# Patient Record
Sex: FEMALE | Race: BLACK OR AFRICAN AMERICAN | Employment: OTHER | URBAN - METROPOLITAN AREA
[De-identification: names, ages, dates, MRNs, and addresses within clinical notes are randomized per-mention and may not be internally consistent; named-entity substitution may affect disease eponyms.]

---

## 2017-01-01 ENCOUNTER — HOSPITAL ENCOUNTER (OUTPATIENT)
Dept: RADIATION THERAPY | Age: 62
End: 2017-01-01
Payer: COMMERCIAL

## 2017-01-01 ENCOUNTER — OFFICE VISIT (OUTPATIENT)
Dept: SURGERY | Age: 62
End: 2017-01-01

## 2017-01-01 ENCOUNTER — APPOINTMENT (OUTPATIENT)
Dept: RADIATION THERAPY | Age: 62
End: 2017-01-01
Payer: COMMERCIAL

## 2017-01-01 ENCOUNTER — HOME CARE VISIT (OUTPATIENT)
Dept: SCHEDULING | Facility: HOME HEALTH | Age: 62
End: 2017-01-01
Payer: COMMERCIAL

## 2017-01-01 ENCOUNTER — ANESTHESIA (OUTPATIENT)
Dept: SURGERY | Age: 62
End: 2017-01-01
Payer: COMMERCIAL

## 2017-01-01 ENCOUNTER — HOSPITAL ENCOUNTER (OUTPATIENT)
Dept: NUCLEAR MEDICINE | Age: 62
Discharge: HOME | End: 2017-07-06
Attending: INTERNAL MEDICINE
Payer: COMMERCIAL

## 2017-01-01 ENCOUNTER — HOSPITAL ENCOUNTER (OUTPATIENT)
Dept: GENERAL RADIOLOGY | Age: 62
Discharge: HOME | End: 2017-07-19
Payer: COMMERCIAL

## 2017-01-01 ENCOUNTER — ANESTHESIA EVENT (OUTPATIENT)
Dept: SURGERY | Age: 62
End: 2017-01-01
Payer: COMMERCIAL

## 2017-01-01 ENCOUNTER — HOME CARE VISIT (OUTPATIENT)
Dept: HOSPICE | Facility: HOSPICE | Age: 62
End: 2017-01-01
Payer: COMMERCIAL

## 2017-01-01 ENCOUNTER — APPOINTMENT (OUTPATIENT)
Dept: CT IMAGING | Age: 62
End: 2017-01-01
Attending: EMERGENCY MEDICINE
Payer: COMMERCIAL

## 2017-01-01 ENCOUNTER — OFFICE VISIT (OUTPATIENT)
Dept: FAMILY MEDICINE CLINIC | Facility: CLINIC | Age: 62
End: 2017-01-01

## 2017-01-01 ENCOUNTER — HOSPITAL ENCOUNTER (OUTPATIENT)
Dept: GENERAL RADIOLOGY | Age: 62
Discharge: HOME | End: 2017-04-28
Payer: COMMERCIAL

## 2017-01-01 ENCOUNTER — HOSPITAL ENCOUNTER (OUTPATIENT)
Dept: LAB | Age: 62
Discharge: HOME | End: 2017-07-19
Payer: COMMERCIAL

## 2017-01-01 ENCOUNTER — HOSPITAL ENCOUNTER (OUTPATIENT)
Dept: INTERVENTIONAL RADIOLOGY/VASCULAR | Age: 62
Discharge: HOME | End: 2017-10-04
Attending: INTERNAL MEDICINE
Payer: COMMERCIAL

## 2017-01-01 ENCOUNTER — APPOINTMENT (OUTPATIENT)
Dept: GENERAL RADIOLOGY | Age: 62
End: 2017-01-01
Attending: SURGERY
Payer: COMMERCIAL

## 2017-01-01 ENCOUNTER — HOSPICE ADMISSION (OUTPATIENT)
Dept: HOSPICE | Facility: HOSPICE | Age: 62
End: 2017-01-01
Payer: COMMERCIAL

## 2017-01-01 ENCOUNTER — HOSPITAL ENCOUNTER (OUTPATIENT)
Dept: CT IMAGING | Age: 62
Discharge: HOME | End: 2017-07-06
Attending: INTERNAL MEDICINE
Payer: COMMERCIAL

## 2017-01-01 ENCOUNTER — HOSPITAL ENCOUNTER (OUTPATIENT)
Dept: RADIATION THERAPY | Age: 62
Discharge: HOME | End: 2017-10-09
Payer: COMMERCIAL

## 2017-01-01 ENCOUNTER — HOSPITAL ENCOUNTER (OUTPATIENT)
Dept: RADIATION THERAPY | Age: 62
Discharge: HOME | End: 2017-10-11
Payer: COMMERCIAL

## 2017-01-01 ENCOUNTER — HOSPITAL ENCOUNTER (OUTPATIENT)
Dept: CT IMAGING | Age: 62
Discharge: HOME | End: 2017-01-05
Attending: INTERNAL MEDICINE
Payer: COMMERCIAL

## 2017-01-01 ENCOUNTER — HOSPITAL ENCOUNTER (OUTPATIENT)
Dept: RADIATION THERAPY | Age: 62
Discharge: HOME | End: 2017-10-13
Payer: COMMERCIAL

## 2017-01-01 ENCOUNTER — APPOINTMENT (OUTPATIENT)
Age: 62
End: 2017-01-01
Attending: EMERGENCY MEDICINE
Payer: COMMERCIAL

## 2017-01-01 ENCOUNTER — HOSPICE ADMISSION (OUTPATIENT)
Dept: HOSPICE | Facility: HOSPICE | Age: 62
End: 2017-01-01

## 2017-01-01 ENCOUNTER — HOSPITAL ENCOUNTER (OUTPATIENT)
Age: 62
Setting detail: OUTPATIENT SURGERY
Discharge: HOME | End: 2017-07-24
Attending: SURGERY | Admitting: SURGERY
Payer: COMMERCIAL

## 2017-01-01 ENCOUNTER — HOSPITAL ENCOUNTER (OUTPATIENT)
Age: 62
Discharge: HOME | End: 2017-03-14
Attending: INTERNAL MEDICINE
Payer: COMMERCIAL

## 2017-01-01 ENCOUNTER — HOSPITAL ENCOUNTER (OUTPATIENT)
Dept: RADIATION THERAPY | Age: 62
Discharge: HOME | End: 2017-10-06
Payer: COMMERCIAL

## 2017-01-01 ENCOUNTER — HOSPITAL ENCOUNTER (OUTPATIENT)
Dept: LAB | Age: 62
Discharge: HOME | End: 2017-07-12

## 2017-01-01 ENCOUNTER — HOSPITAL ENCOUNTER (OUTPATIENT)
Age: 62
Setting detail: OUTPATIENT SURGERY
Discharge: HOME | End: 2017-05-01
Attending: SURGERY | Admitting: SURGERY
Payer: COMMERCIAL

## 2017-01-01 ENCOUNTER — HOSPITAL ENCOUNTER (OUTPATIENT)
Dept: LAB | Age: 62
Discharge: HOME | End: 2017-04-27

## 2017-01-01 ENCOUNTER — HOSPITAL ENCOUNTER (OUTPATIENT)
Dept: RADIATION THERAPY | Age: 62
Discharge: HOME | End: 2017-09-27
Payer: COMMERCIAL

## 2017-01-01 ENCOUNTER — HOSPITAL ENCOUNTER (OUTPATIENT)
Dept: MRI IMAGING | Age: 62
Discharge: HOME | End: 2017-07-20
Attending: INTERNAL MEDICINE
Payer: COMMERCIAL

## 2017-01-01 ENCOUNTER — OFFICE VISIT (OUTPATIENT)
Dept: ORTHOPEDIC SURGERY | Facility: CLINIC | Age: 62
End: 2017-01-01

## 2017-01-01 ENCOUNTER — HOSPITAL ENCOUNTER (OUTPATIENT)
Age: 62
Setting detail: OBSERVATION
Discharge: HOSPICE | End: 2017-10-17
Attending: EMERGENCY MEDICINE | Admitting: EMERGENCY MEDICINE
Payer: COMMERCIAL

## 2017-01-01 ENCOUNTER — HOSPITAL ENCOUNTER (OUTPATIENT)
Dept: CT IMAGING | Age: 62
Discharge: HOME | End: 2017-09-11
Attending: INTERNAL MEDICINE
Payer: COMMERCIAL

## 2017-01-01 ENCOUNTER — HOSPITAL ENCOUNTER (OUTPATIENT)
Dept: MAMMOGRAPHY | Age: 62
Discharge: HOME | End: 2017-09-18
Attending: NURSE PRACTITIONER
Payer: COMMERCIAL

## 2017-01-01 ENCOUNTER — HOSPITAL ENCOUNTER (OUTPATIENT)
Dept: RADIATION THERAPY | Age: 62
Discharge: HOME | End: 2017-10-02
Payer: COMMERCIAL

## 2017-01-01 ENCOUNTER — OFFICE VISIT (OUTPATIENT)
Dept: CARDIOLOGY CLINIC | Age: 62
End: 2017-01-01

## 2017-01-01 ENCOUNTER — HOSPITAL ENCOUNTER (OUTPATIENT)
Dept: RADIATION THERAPY | Age: 62
Discharge: HOME | End: 2017-09-26
Payer: COMMERCIAL

## 2017-01-01 ENCOUNTER — HOSPITAL ENCOUNTER (OUTPATIENT)
Dept: RADIATION THERAPY | Age: 62
Discharge: HOME | End: 2017-01-18
Payer: COMMERCIAL

## 2017-01-01 ENCOUNTER — HOSPITAL ENCOUNTER (OUTPATIENT)
Dept: RADIATION THERAPY | Age: 62
Discharge: HOME | End: 2017-10-04
Payer: COMMERCIAL

## 2017-01-01 ENCOUNTER — HOSPITAL ENCOUNTER (OUTPATIENT)
Dept: MAMMOGRAPHY | Age: 62
Discharge: HOME | End: 2017-03-14
Attending: INTERNAL MEDICINE
Payer: COMMERCIAL

## 2017-01-01 ENCOUNTER — HOSPITAL ENCOUNTER (OUTPATIENT)
Dept: RADIATION THERAPY | Age: 62
Discharge: HOME | End: 2017-10-10
Payer: COMMERCIAL

## 2017-01-01 ENCOUNTER — HOSPITAL ENCOUNTER (OUTPATIENT)
Dept: ULTRASOUND IMAGING | Age: 62
Discharge: HOME | End: 2017-04-06
Attending: SURGERY
Payer: COMMERCIAL

## 2017-01-01 ENCOUNTER — HOSPITAL ENCOUNTER (OUTPATIENT)
Dept: RADIATION THERAPY | Age: 62
Discharge: HOME | End: 2017-10-12
Payer: COMMERCIAL

## 2017-01-01 ENCOUNTER — TELEPHONE (OUTPATIENT)
Dept: SURGERY | Age: 62
End: 2017-01-01

## 2017-01-01 ENCOUNTER — HOSPITAL ENCOUNTER (OUTPATIENT)
Dept: RADIATION THERAPY | Age: 62
Discharge: HOME | End: 2017-10-03
Payer: COMMERCIAL

## 2017-01-01 ENCOUNTER — HOSPITAL ENCOUNTER (OUTPATIENT)
Dept: GENERAL RADIOLOGY | Age: 62
Discharge: HOME | End: 2017-08-28
Payer: COMMERCIAL

## 2017-01-01 ENCOUNTER — HOSPITAL ENCOUNTER (OUTPATIENT)
Dept: LAB | Age: 62
Discharge: HOME | End: 2017-04-28
Payer: COMMERCIAL

## 2017-01-01 ENCOUNTER — TELEPHONE (OUTPATIENT)
Dept: FAMILY MEDICINE CLINIC | Facility: CLINIC | Age: 62
End: 2017-01-01

## 2017-01-01 VITALS
SYSTOLIC BLOOD PRESSURE: 118 MMHG | HEIGHT: 63 IN | RESPIRATION RATE: 18 BRPM | DIASTOLIC BLOOD PRESSURE: 78 MMHG | WEIGHT: 244 LBS | BODY MASS INDEX: 43.23 KG/M2 | TEMPERATURE: 97.7 F | HEART RATE: 98 BPM

## 2017-01-01 VITALS
DIASTOLIC BLOOD PRESSURE: 65 MMHG | SYSTOLIC BLOOD PRESSURE: 124 MMHG | WEIGHT: 262 LBS | TEMPERATURE: 98.3 F | BODY MASS INDEX: 46.42 KG/M2 | OXYGEN SATURATION: 97 % | HEART RATE: 99 BPM | RESPIRATION RATE: 18 BRPM | HEIGHT: 63 IN

## 2017-01-01 VITALS
WEIGHT: 265.5 LBS | OXYGEN SATURATION: 93 % | HEIGHT: 63 IN | HEART RATE: 83 BPM | SYSTOLIC BLOOD PRESSURE: 113 MMHG | RESPIRATION RATE: 20 BRPM | DIASTOLIC BLOOD PRESSURE: 71 MMHG | BODY MASS INDEX: 47.04 KG/M2 | TEMPERATURE: 97.1 F

## 2017-01-01 VITALS
HEART RATE: 90 BPM | BODY MASS INDEX: 47.13 KG/M2 | SYSTOLIC BLOOD PRESSURE: 114 MMHG | HEIGHT: 63 IN | WEIGHT: 266 LBS | DIASTOLIC BLOOD PRESSURE: 54 MMHG

## 2017-01-01 VITALS — HEIGHT: 63 IN | SYSTOLIC BLOOD PRESSURE: 113 MMHG | HEART RATE: 93 BPM | DIASTOLIC BLOOD PRESSURE: 67 MMHG

## 2017-01-01 VITALS
RESPIRATION RATE: 24 BRPM | TEMPERATURE: 98.2 F | SYSTOLIC BLOOD PRESSURE: 126 MMHG | HEIGHT: 63 IN | WEIGHT: 238 LBS | HEART RATE: 84 BPM | DIASTOLIC BLOOD PRESSURE: 70 MMHG | BODY MASS INDEX: 42.17 KG/M2

## 2017-01-01 VITALS
OXYGEN SATURATION: 95 % | TEMPERATURE: 98 F | RESPIRATION RATE: 20 BRPM | HEIGHT: 63 IN | DIASTOLIC BLOOD PRESSURE: 76 MMHG | WEIGHT: 246 LBS | SYSTOLIC BLOOD PRESSURE: 138 MMHG | HEART RATE: 92 BPM | BODY MASS INDEX: 43.59 KG/M2

## 2017-01-01 VITALS
DIASTOLIC BLOOD PRESSURE: 56 MMHG | SYSTOLIC BLOOD PRESSURE: 125 MMHG | HEART RATE: 70 BPM | TEMPERATURE: 98 F | BODY MASS INDEX: 46.06 KG/M2 | WEIGHT: 260 LBS

## 2017-01-01 VITALS
WEIGHT: 265 LBS | HEIGHT: 63 IN | HEART RATE: 74 BPM | SYSTOLIC BLOOD PRESSURE: 128 MMHG | BODY MASS INDEX: 46.95 KG/M2 | OXYGEN SATURATION: 91 % | RESPIRATION RATE: 18 BRPM | TEMPERATURE: 98 F | DIASTOLIC BLOOD PRESSURE: 64 MMHG

## 2017-01-01 VITALS
HEART RATE: 72 BPM | DIASTOLIC BLOOD PRESSURE: 78 MMHG | WEIGHT: 267 LBS | RESPIRATION RATE: 18 BRPM | TEMPERATURE: 98.1 F | SYSTOLIC BLOOD PRESSURE: 126 MMHG | BODY MASS INDEX: 47.31 KG/M2 | HEIGHT: 63 IN

## 2017-01-01 VITALS
RESPIRATION RATE: 16 BRPM | DIASTOLIC BLOOD PRESSURE: 63 MMHG | SYSTOLIC BLOOD PRESSURE: 111 MMHG | HEIGHT: 63 IN | OXYGEN SATURATION: 94 % | TEMPERATURE: 97.6 F | BODY MASS INDEX: 48.55 KG/M2 | WEIGHT: 274 LBS | HEART RATE: 74 BPM

## 2017-01-01 VITALS
HEIGHT: 63 IN | RESPIRATION RATE: 20 BRPM | BODY MASS INDEX: 47.13 KG/M2 | DIASTOLIC BLOOD PRESSURE: 70 MMHG | HEART RATE: 84 BPM | TEMPERATURE: 98.1 F | SYSTOLIC BLOOD PRESSURE: 110 MMHG | WEIGHT: 266 LBS

## 2017-01-01 VITALS
WEIGHT: 267 LBS | HEART RATE: 70 BPM | BODY MASS INDEX: 47.3 KG/M2 | DIASTOLIC BLOOD PRESSURE: 65 MMHG | TEMPERATURE: 96.1 F | SYSTOLIC BLOOD PRESSURE: 121 MMHG

## 2017-01-01 VITALS
RESPIRATION RATE: 19 BRPM | WEIGHT: 250.3 LBS | BODY MASS INDEX: 44.35 KG/M2 | HEART RATE: 94 BPM | SYSTOLIC BLOOD PRESSURE: 105 MMHG | HEIGHT: 63 IN | DIASTOLIC BLOOD PRESSURE: 71 MMHG | TEMPERATURE: 97.8 F | OXYGEN SATURATION: 96 %

## 2017-01-01 VITALS
RESPIRATION RATE: 16 BRPM | HEIGHT: 63 IN | BODY MASS INDEX: 43.23 KG/M2 | WEIGHT: 244 LBS | HEART RATE: 70 BPM | DIASTOLIC BLOOD PRESSURE: 65 MMHG | TEMPERATURE: 96.9 F | SYSTOLIC BLOOD PRESSURE: 105 MMHG

## 2017-01-01 VITALS
TEMPERATURE: 97.5 F | RESPIRATION RATE: 18 BRPM | HEART RATE: 83 BPM | HEIGHT: 63 IN | DIASTOLIC BLOOD PRESSURE: 80 MMHG | WEIGHT: 257.4 LBS | OXYGEN SATURATION: 96 % | SYSTOLIC BLOOD PRESSURE: 127 MMHG | BODY MASS INDEX: 45.61 KG/M2

## 2017-01-01 VITALS
DIASTOLIC BLOOD PRESSURE: 70 MMHG | HEART RATE: 85 BPM | SYSTOLIC BLOOD PRESSURE: 108 MMHG | RESPIRATION RATE: 20 BRPM | TEMPERATURE: 98.3 F | OXYGEN SATURATION: 96 %

## 2017-01-01 DIAGNOSIS — I11.9 BENIGN HYPERTENSIVE HEART DISEASE WITHOUT HEART FAILURE: Primary | ICD-10-CM

## 2017-01-01 DIAGNOSIS — C50.411 MALIGNANT NEOPLASM OF UPPER-OUTER QUADRANT OF RIGHT FEMALE BREAST (HCC): ICD-10-CM

## 2017-01-01 DIAGNOSIS — C78.7 ADENOCARCINOMA OF RIGHT BREAST METASTATIC TO LIVER (HCC): ICD-10-CM

## 2017-01-01 DIAGNOSIS — Z17.1: Primary | ICD-10-CM

## 2017-01-01 DIAGNOSIS — M17.11 PRIMARY OSTEOARTHRITIS OF RIGHT KNEE: Primary | ICD-10-CM

## 2017-01-01 DIAGNOSIS — C50.911 ADENOCARCINOMA OF RIGHT BREAST METASTATIC TO LIVER (HCC): ICD-10-CM

## 2017-01-01 DIAGNOSIS — Z09 POSTOPERATIVE EXAMINATION: ICD-10-CM

## 2017-01-01 DIAGNOSIS — F32.A DEPRESSION, UNSPECIFIED DEPRESSION TYPE: ICD-10-CM

## 2017-01-01 DIAGNOSIS — Z92.21 S/P CHEMOTHERAPY, TIME SINCE 4-12 WEEKS: Primary | ICD-10-CM

## 2017-01-01 DIAGNOSIS — G47.33 OSA ON CPAP: ICD-10-CM

## 2017-01-01 DIAGNOSIS — I11.9 BENIGN HYPERTENSIVE HEART DISEASE WITHOUT HEART FAILURE: ICD-10-CM

## 2017-01-01 DIAGNOSIS — M54.50 ACUTE BILATERAL LOW BACK PAIN WITHOUT SCIATICA: Primary | ICD-10-CM

## 2017-01-01 DIAGNOSIS — I20.8 ANGINA DECUBITUS (HCC): ICD-10-CM

## 2017-01-01 DIAGNOSIS — C50.911: Primary | ICD-10-CM

## 2017-01-01 DIAGNOSIS — E11.9 TYPE 2 DIABETES MELLITUS WITHOUT COMPLICATION, WITHOUT LONG-TERM CURRENT USE OF INSULIN (HCC): Primary | ICD-10-CM

## 2017-01-01 DIAGNOSIS — E66.01 MORBID OBESITY DUE TO EXCESS CALORIES (HCC): ICD-10-CM

## 2017-01-01 DIAGNOSIS — Z99.89 OSA ON CPAP: ICD-10-CM

## 2017-01-01 DIAGNOSIS — C79.49 MENINGEAL CARCINOMATOSIS (HCC): Primary | ICD-10-CM

## 2017-01-01 DIAGNOSIS — C50.912 MALIGNANT NEOPLASM OF LEFT FEMALE BREAST, UNSPECIFIED SITE OF BREAST: Primary | ICD-10-CM

## 2017-01-01 DIAGNOSIS — Z85.3 HISTORY OF RIGHT BREAST CANCER: Primary | ICD-10-CM

## 2017-01-01 DIAGNOSIS — E78.5 HYPERLIPIDEMIA, UNSPECIFIED HYPERLIPIDEMIA TYPE: ICD-10-CM

## 2017-01-01 DIAGNOSIS — Z09 POSTOPERATIVE EXAMINATION: Primary | ICD-10-CM

## 2017-01-01 DIAGNOSIS — C80.1 MENINGEAL CARCINOMATOSIS (HCC): Primary | ICD-10-CM

## 2017-01-01 DIAGNOSIS — Z12.31 SCREENING MAMMOGRAM, ENCOUNTER FOR: ICD-10-CM

## 2017-01-01 DIAGNOSIS — C50.912 MALIGNANT NEOPLASM OF LEFT FEMALE BREAST, UNSPECIFIED SITE OF BREAST: ICD-10-CM

## 2017-01-01 DIAGNOSIS — M17.11 PRIMARY OSTEOARTHRITIS OF RIGHT KNEE: ICD-10-CM

## 2017-01-01 DIAGNOSIS — M25.512 ACUTE PAIN OF LEFT SHOULDER: Primary | ICD-10-CM

## 2017-01-01 DIAGNOSIS — Z17.1: ICD-10-CM

## 2017-01-01 DIAGNOSIS — Z11.59 NEED FOR HEPATITIS C SCREENING TEST: ICD-10-CM

## 2017-01-01 DIAGNOSIS — I20.8 ANGINA DECUBITUS (HCC): Primary | ICD-10-CM

## 2017-01-01 DIAGNOSIS — I51.7 CARDIOMEGALY: ICD-10-CM

## 2017-01-01 DIAGNOSIS — C50.919 METASTATIC BREAST CANCER (HCC): ICD-10-CM

## 2017-01-01 DIAGNOSIS — N64.4 BREAST PAIN: ICD-10-CM

## 2017-01-01 DIAGNOSIS — Z85.3 PERSONAL HISTORY OF MALIGNANT NEOPLASM OF BREAST: Primary | ICD-10-CM

## 2017-01-01 DIAGNOSIS — K59.00 CONSTIPATION, UNSPECIFIED CONSTIPATION TYPE: ICD-10-CM

## 2017-01-01 DIAGNOSIS — C50.911: ICD-10-CM

## 2017-01-01 DIAGNOSIS — Z12.31 VISIT FOR SCREENING MAMMOGRAM: ICD-10-CM

## 2017-01-01 DIAGNOSIS — Z85.3 HISTORY OF RIGHT BREAST CANCER: ICD-10-CM

## 2017-01-01 DIAGNOSIS — Z92.21 S/P CHEMOTHERAPY, TIME SINCE 4-12 WEEKS: ICD-10-CM

## 2017-01-01 DIAGNOSIS — M70.51 PES ANSERINUS BURSITIS OF RIGHT KNEE: ICD-10-CM

## 2017-01-01 DIAGNOSIS — C50.919 BREAST CA (HCC): ICD-10-CM

## 2017-01-01 LAB
ALBUMIN SERPL BCP-MCNC: 3.1 G/DL (ref 3.4–5)
ALBUMIN SERPL-MCNC: 1.9 G/DL (ref 3.4–5)
ALBUMIN/GLOB SERPL: 0.5 {RATIO} (ref 0.8–1.7)
ALBUMIN/GLOB SERPL: 0.7 {RATIO} (ref 0.8–1.7)
ALP SERPL-CCNC: 148 U/L (ref 45–117)
ALP SERPL-CCNC: 435 U/L (ref 45–117)
ALT SERPL-CCNC: 115 U/L (ref 13–56)
ALT SERPL-CCNC: 20 U/L (ref 13–56)
ANION GAP BLD CALC-SCNC: 8 MMOL/L (ref 3–18)
ANION GAP BLD CALC-SCNC: 9 MMOL/L (ref 3–18)
ANION GAP SERPL CALC-SCNC: 6 MMOL/L (ref 3–18)
ANION GAP SERPL CALC-SCNC: 9 MMOL/L (ref 3–18)
AST SERPL W P-5'-P-CCNC: 10 U/L (ref 15–37)
AST SERPL-CCNC: 167 U/L (ref 15–37)
ATRIAL RATE: 72 BPM
ATRIAL RATE: 77 BPM
ATRIAL RATE: 78 BPM
BASOPHILS # BLD AUTO: 0 K/UL (ref 0–0.1)
BASOPHILS # BLD AUTO: 0 K/UL (ref 0–0.1)
BASOPHILS # BLD: 0 % (ref 0–2)
BASOPHILS # BLD: 0 % (ref 0–2)
BASOPHILS # BLD: 0 K/UL (ref 0–0.06)
BASOPHILS NFR BLD: 0 % (ref 0–2)
BILIRUB SERPL-MCNC: 0.3 MG/DL (ref 0.2–1)
BILIRUB SERPL-MCNC: 3.4 MG/DL (ref 0.2–1)
BILIRUB UR QL STRIP: NEGATIVE
BUN SERPL-MCNC: 11 MG/DL (ref 7–18)
BUN SERPL-MCNC: 15 MG/DL (ref 7–18)
BUN SERPL-MCNC: 16 MG/DL (ref 7–18)
BUN SERPL-MCNC: 34 MG/DL (ref 7–18)
BUN/CREAT SERPL: 14 (ref 12–20)
BUN/CREAT SERPL: 18 (ref 12–20)
BUN/CREAT SERPL: 18 (ref 12–20)
BUN/CREAT SERPL: 19 (ref 12–20)
CALCIUM SERPL-MCNC: 10.7 MG/DL (ref 8.5–10.1)
CALCIUM SERPL-MCNC: 7.2 MG/DL (ref 8.5–10.1)
CALCIUM SERPL-MCNC: 7.8 MG/DL (ref 8.5–10.1)
CALCIUM SERPL-MCNC: 9.4 MG/DL (ref 8.5–10.1)
CALCULATED P AXIS, ECG09: 34 DEGREES
CALCULATED P AXIS, ECG09: 41 DEGREES
CALCULATED P AXIS, ECG09: 45 DEGREES
CALCULATED R AXIS, ECG10: -45 DEGREES
CALCULATED R AXIS, ECG10: -46 DEGREES
CALCULATED R AXIS, ECG10: -63 DEGREES
CALCULATED T AXIS, ECG11: -11 DEGREES
CALCULATED T AXIS, ECG11: 15 DEGREES
CALCULATED T AXIS, ECG11: 33 DEGREES
CHLORIDE SERPL-SCNC: 101 MMOL/L (ref 100–108)
CHLORIDE SERPL-SCNC: 105 MMOL/L (ref 100–108)
CHLORIDE SERPL-SCNC: 98 MMOL/L (ref 100–108)
CHLORIDE SERPL-SCNC: 99 MMOL/L (ref 100–108)
CHOLEST SERPL-MCNC: 185 MG/DL
CO2 SERPL-SCNC: 26 MMOL/L (ref 21–32)
CO2 SERPL-SCNC: 29 MMOL/L (ref 21–32)
CO2 SERPL-SCNC: 30 MMOL/L (ref 21–32)
CO2 SERPL-SCNC: 32 MMOL/L (ref 21–32)
CREAT SERPL-MCNC: 0.62 MG/DL (ref 0.6–1.3)
CREAT SERPL-MCNC: 0.78 MG/DL (ref 0.6–1.3)
CREAT SERPL-MCNC: 0.89 MG/DL (ref 0.6–1.3)
CREAT SERPL-MCNC: 2.41 MG/DL (ref 0.6–1.3)
CREAT UR-MCNC: 0.7 MG/DL (ref 0.6–1.3)
CREAT UR-MCNC: 0.8 MG/DL (ref 0.6–1.3)
CREAT UR-MCNC: 0.9 MG/DL (ref 0.6–1.3)
DIAGNOSIS, 93000: NORMAL
DIFFERENTIAL METHOD BLD: ABNORMAL
EOSINOPHIL # BLD: 0 K/UL (ref 0–0.4)
EOSINOPHIL # BLD: 0.1 K/UL (ref 0–0.4)
EOSINOPHIL # BLD: 0.1 K/UL (ref 0–0.4)
EOSINOPHIL NFR BLD: 0 % (ref 0–5)
EOSINOPHIL NFR BLD: 2 % (ref 0–5)
EOSINOPHIL NFR BLD: 2 % (ref 0–5)
ERYTHROCYTE [DISTWIDTH] IN BLOOD BY AUTOMATED COUNT: 15.7 % (ref 11.6–14.5)
ERYTHROCYTE [DISTWIDTH] IN BLOOD BY AUTOMATED COUNT: 15.9 % (ref 11.6–14.5)
ERYTHROCYTE [DISTWIDTH] IN BLOOD BY AUTOMATED COUNT: 25.7 % (ref 11.6–14.5)
ERYTHROCYTE [DISTWIDTH] IN BLOOD BY AUTOMATED COUNT: 27 % (ref 11.6–14.5)
GLOBULIN SER CALC-MCNC: 3.5 G/DL (ref 2–4)
GLOBULIN SER CALC-MCNC: 4.4 G/DL (ref 2–4)
GLUCOSE BLD STRIP.AUTO-MCNC: 122 MG/DL (ref 70–110)
GLUCOSE BLD STRIP.AUTO-MCNC: 127 MG/DL (ref 70–110)
GLUCOSE BLD STRIP.AUTO-MCNC: 142 MG/DL (ref 70–110)
GLUCOSE BLD STRIP.AUTO-MCNC: 78 MG/DL (ref 70–110)
GLUCOSE BLD STRIP.AUTO-MCNC: 89 MG/DL (ref 70–110)
GLUCOSE BLD STRIP.AUTO-MCNC: 99 MG/DL (ref 70–110)
GLUCOSE SERPL-MCNC: 104 MG/DL (ref 74–99)
GLUCOSE SERPL-MCNC: 109 MG/DL (ref 74–99)
GLUCOSE SERPL-MCNC: 76 MG/DL (ref 74–99)
GLUCOSE SERPL-MCNC: 93 MG/DL (ref 74–99)
GLUCOSE UR-MCNC: NEGATIVE MG/DL
HCT VFR BLD AUTO: 36.7 % (ref 35–45)
HCT VFR BLD AUTO: 36.8 % (ref 35–45)
HCT VFR BLD AUTO: 39.2 % (ref 35–45)
HCT VFR BLD AUTO: 42.5 % (ref 35–45)
HDLC SERPL-MCNC: 17 MG/DL (ref 40–60)
HDLC SERPL: 10.9 {RATIO} (ref 0–5)
HGB BLD-MCNC: 11.8 G/DL (ref 12–16)
HGB BLD-MCNC: 12.3 G/DL (ref 12–16)
HGB BLD-MCNC: 12.6 G/DL (ref 12–16)
HGB BLD-MCNC: 13.8 G/DL (ref 12–16)
INR PPP: 1.5 (ref 0.8–1.2)
KETONES P FAST UR STRIP-MCNC: NEGATIVE MG/DL
LACTATE BLD-SCNC: 1.8 MMOL/L (ref 0.4–2)
LDL-C, EXTERNAL: 128
LDLC SERPL CALC-MCNC: 157.2 MG/DL (ref 0–100)
LIPID PROFILE,FLP: ABNORMAL
LYMPHOCYTES # BLD AUTO: 13 % (ref 21–52)
LYMPHOCYTES # BLD AUTO: 16 % (ref 21–52)
LYMPHOCYTES # BLD: 0.2 K/UL (ref 0.9–3.6)
LYMPHOCYTES # BLD: 0.8 K/UL (ref 0.9–3.6)
LYMPHOCYTES # BLD: 1.1 K/UL (ref 0.9–3.6)
LYMPHOCYTES NFR BLD: 5 % (ref 21–52)
MCH RBC QN AUTO: 27.1 PG (ref 24–34)
MCH RBC QN AUTO: 27.8 PG (ref 24–34)
MCH RBC QN AUTO: 28.6 PG (ref 24–34)
MCH RBC QN AUTO: 29.8 PG (ref 24–34)
MCHC RBC AUTO-ENTMCNC: 32.1 G/DL (ref 31–37)
MCHC RBC AUTO-ENTMCNC: 32.1 G/DL (ref 31–37)
MCHC RBC AUTO-ENTMCNC: 32.5 G/DL (ref 31–37)
MCHC RBC AUTO-ENTMCNC: 33.5 G/DL (ref 31–37)
MCV RBC AUTO: 84.6 FL (ref 74–97)
MCV RBC AUTO: 86.5 FL (ref 74–97)
MCV RBC AUTO: 88.2 FL (ref 74–97)
MCV RBC AUTO: 88.9 FL (ref 74–97)
MICROALBUMIN UR TEST STR-MCNC: 30 MG/L
MICROALBUMIN/CREAT RATIO POC: NORMAL MG/G
MONOCYTES # BLD: 0.4 K/UL (ref 0.05–1.2)
MONOCYTES # BLD: 0.4 K/UL (ref 0.05–1.2)
MONOCYTES # BLD: 0.7 K/UL (ref 0.05–1.2)
MONOCYTES NFR BLD AUTO: 11 % (ref 3–10)
MONOCYTES NFR BLD AUTO: 6 % (ref 3–10)
MONOCYTES NFR BLD: 9 % (ref 3–10)
NEUTS SEG # BLD: 4.1 K/UL (ref 1.8–8)
NEUTS SEG # BLD: 4.7 K/UL (ref 1.8–8)
NEUTS SEG # BLD: 5.6 K/UL (ref 1.8–8)
NEUTS SEG NFR BLD AUTO: 74 % (ref 40–73)
NEUTS SEG NFR BLD AUTO: 76 % (ref 40–73)
NEUTS SEG NFR BLD: 86 % (ref 40–73)
P-R INTERVAL, ECG05: 126 MS
P-R INTERVAL, ECG05: 162 MS
P-R INTERVAL, ECG05: 182 MS
PH UR STRIP: 5.5 [PH] (ref 4.6–8)
PLATELET # BLD AUTO: 128 K/UL (ref 135–420)
PLATELET # BLD AUTO: 272 K/UL (ref 135–420)
PLATELET # BLD AUTO: 282 K/UL (ref 135–420)
PLATELET # BLD AUTO: 86 K/UL (ref 135–420)
PMV BLD AUTO: 10.7 FL (ref 9.2–11.8)
PMV BLD AUTO: 11.2 FL (ref 9.2–11.8)
PMV BLD AUTO: 11.3 FL (ref 9.2–11.8)
POTASSIUM SERPL-SCNC: 3.9 MMOL/L (ref 3.5–5.5)
POTASSIUM SERPL-SCNC: 4.2 MMOL/L (ref 3.5–5.5)
POTASSIUM SERPL-SCNC: 4.2 MMOL/L (ref 3.5–5.5)
POTASSIUM SERPL-SCNC: 4.5 MMOL/L (ref 3.5–5.5)
PROT SERPL-MCNC: 5.4 G/DL (ref 6.4–8.2)
PROT SERPL-MCNC: 7.5 G/DL (ref 6.4–8.2)
PROT UR QL STRIP: NEGATIVE MG/DL
PROTHROMBIN TIME: 17.2 SEC (ref 11.5–15.2)
Q-T INTERVAL, ECG07: 364 MS
Q-T INTERVAL, ECG07: 408 MS
Q-T INTERVAL, ECG07: 428 MS
QRS DURATION, ECG06: 106 MS
QRS DURATION, ECG06: 108 MS
QRS DURATION, ECG06: 142 MS
QTC CALCULATION (BEZET), ECG08: 398 MS
QTC CALCULATION (BEZET), ECG08: 465 MS
QTC CALCULATION (BEZET), ECG08: 484 MS
RBC # BLD AUTO: 4.13 M/UL (ref 4.2–5.3)
RBC # BLD AUTO: 4.35 M/UL (ref 4.2–5.3)
RBC # BLD AUTO: 4.53 M/UL (ref 4.2–5.3)
RBC # BLD AUTO: 4.82 M/UL (ref 4.2–5.3)
RBC MORPH BLD: ABNORMAL
RBC MORPH BLD: ABNORMAL
SENTARA SPECIMEN COL,SENBCF: NORMAL
SODIUM SERPL-SCNC: 137 MMOL/L (ref 136–145)
SODIUM SERPL-SCNC: 137 MMOL/L (ref 136–145)
SODIUM SERPL-SCNC: 139 MMOL/L (ref 136–145)
SODIUM SERPL-SCNC: 139 MMOL/L (ref 136–145)
SP GR UR STRIP: 1.02 (ref 1–1.03)
TRIGL SERPL-MCNC: 54 MG/DL (ref ?–150)
UA UROBILINOGEN AMB POC: NORMAL (ref 0.2–1)
URATE SERPL-MCNC: 7.2 MG/DL (ref 2.6–7.2)
URINALYSIS CLARITY POC: NORMAL
URINALYSIS COLOR POC: NORMAL
URINE BLOOD POC: NEGATIVE
URINE LEUKOCYTES POC: NORMAL
URINE NITRITES POC: NEGATIVE
VENTRICULAR RATE, ECG03: 72 BPM
VENTRICULAR RATE, ECG03: 77 BPM
VENTRICULAR RATE, ECG03: 78 BPM
VLDLC SERPL CALC-MCNC: 10.8 MG/DL
WBC # BLD AUTO: 3.8 K/UL (ref 4.6–13.2)
WBC # BLD AUTO: 4.7 K/UL (ref 4.6–13.2)
WBC # BLD AUTO: 6.4 K/UL (ref 4.6–13.2)
WBC # BLD AUTO: 7.2 K/UL (ref 4.6–13.2)

## 2017-01-01 PROCEDURE — A4927 NON-STERILE GLOVES: HCPCS

## 2017-01-01 PROCEDURE — 77030011640 HC PAD GRND REM COVD -A: Performed by: SURGERY

## 2017-01-01 PROCEDURE — 99211 OFF/OP EST MAY X REQ PHY/QHP: CPT

## 2017-01-01 PROCEDURE — 76210000021 HC REC RM PH II 0.5 TO 1 HR: Performed by: SURGERY

## 2017-01-01 PROCEDURE — 74011000250 HC RX REV CODE- 250

## 2017-01-01 PROCEDURE — 74011000250 HC RX REV CODE- 250: Performed by: SURGERY

## 2017-01-01 PROCEDURE — 78306 BONE IMAGING WHOLE BODY: CPT

## 2017-01-01 PROCEDURE — 36415 COLL VENOUS BLD VENIPUNCTURE: CPT | Performed by: HOSPITALIST

## 2017-01-01 PROCEDURE — 93005 ELECTROCARDIOGRAM TRACING: CPT

## 2017-01-01 PROCEDURE — 77386 HC IMRT TRMT DLVR COMPL: CPT

## 2017-01-01 PROCEDURE — 77030032490 HC SLV COMPR SCD KNE COVD -B: Performed by: SURGERY

## 2017-01-01 PROCEDURE — 74011250637 HC RX REV CODE- 250/637: Performed by: NURSE ANESTHETIST, CERTIFIED REGISTERED

## 2017-01-01 PROCEDURE — 70553 MRI BRAIN STEM W/O & W/DYE: CPT

## 2017-01-01 PROCEDURE — 77370 RADIATION PHYSICS CONSULT: CPT

## 2017-01-01 PROCEDURE — 76642 ULTRASOUND BREAST LIMITED: CPT

## 2017-01-01 PROCEDURE — 74177 CT ABD & PELVIS W/CONTRAST: CPT

## 2017-01-01 PROCEDURE — 0651 HSPC ROUTINE HOME CARE

## 2017-01-01 PROCEDURE — 74011250636 HC RX REV CODE- 250/636: Performed by: EMERGENCY MEDICINE

## 2017-01-01 PROCEDURE — 99218 HC RM OBSERVATION: CPT

## 2017-01-01 PROCEDURE — 77030018836 HC SOL IRR NACL ICUM -A: Performed by: SURGERY

## 2017-01-01 PROCEDURE — 97161 PT EVAL LOW COMPLEX 20 MIN: CPT

## 2017-01-01 PROCEDURE — G0299 HHS/HOSPICE OF RN EA 15 MIN: HCPCS

## 2017-01-01 PROCEDURE — 74011250636 HC RX REV CODE- 250/636: Performed by: SURGERY

## 2017-01-01 PROCEDURE — 77059 MRI BREAST BI W WO CONT: CPT

## 2017-01-01 PROCEDURE — 76060000033 HC ANESTHESIA 1 TO 1.5 HR: Performed by: SURGERY

## 2017-01-01 PROCEDURE — A9585 GADOBUTROL INJECTION: HCPCS | Performed by: INTERNAL MEDICINE

## 2017-01-01 PROCEDURE — 82962 GLUCOSE BLOOD TEST: CPT

## 2017-01-01 PROCEDURE — 99285 EMERGENCY DEPT VISIT HI MDM: CPT

## 2017-01-01 PROCEDURE — 74011250637 HC RX REV CODE- 250/637: Performed by: EMERGENCY MEDICINE

## 2017-01-01 PROCEDURE — 71020 XR CHEST PA LAT: CPT

## 2017-01-01 PROCEDURE — 77030002933 HC SUT MCRYL J&J -A: Performed by: SURGERY

## 2017-01-01 PROCEDURE — 70450 CT HEAD/BRAIN W/O DYE: CPT

## 2017-01-01 PROCEDURE — 77030020782 HC GWN BAIR PAWS FLX 3M -B: Performed by: SURGERY

## 2017-01-01 PROCEDURE — 74011636320 HC RX REV CODE- 636/320: Performed by: INTERNAL MEDICINE

## 2017-01-01 PROCEDURE — 85025 COMPLETE CBC W/AUTO DIFF WBC: CPT | Performed by: EMERGENCY MEDICINE

## 2017-01-01 PROCEDURE — 84550 ASSAY OF BLOOD/URIC ACID: CPT | Performed by: EMERGENCY MEDICINE

## 2017-01-01 PROCEDURE — 74011250636 HC RX REV CODE- 250/636

## 2017-01-01 PROCEDURE — 82565 ASSAY OF CREATININE: CPT

## 2017-01-01 PROCEDURE — 85610 PROTHROMBIN TIME: CPT | Performed by: EMERGENCY MEDICINE

## 2017-01-01 PROCEDURE — 80053 COMPREHEN METABOLIC PANEL: CPT | Performed by: SURGERY

## 2017-01-01 PROCEDURE — 80048 BASIC METABOLIC PNL TOTAL CA: CPT | Performed by: NURSE PRACTITIONER

## 2017-01-01 PROCEDURE — 36415 COLL VENOUS BLD VENIPUNCTURE: CPT | Performed by: NURSE PRACTITIONER

## 2017-01-01 PROCEDURE — G0378 HOSPITAL OBSERVATION PER HR: HCPCS

## 2017-01-01 PROCEDURE — HOSPICE MEDICATION HC HH HOSPICE MEDICATION

## 2017-01-01 PROCEDURE — 74011250636 HC RX REV CODE- 250/636: Performed by: INTERNAL MEDICINE

## 2017-01-01 PROCEDURE — 76060000032 HC ANESTHESIA 0.5 TO 1 HR: Performed by: SURGERY

## 2017-01-01 PROCEDURE — A9585 GADOBUTROL INJECTION: HCPCS | Performed by: EMERGENCY MEDICINE

## 2017-01-01 PROCEDURE — 85025 COMPLETE CBC W/AUTO DIFF WBC: CPT | Performed by: NURSE PRACTITIONER

## 2017-01-01 PROCEDURE — 90686 IIV4 VACC NO PRSV 0.5 ML IM: CPT | Performed by: HOSPITALIST

## 2017-01-01 PROCEDURE — C1769 GUIDE WIRE: HCPCS | Performed by: SURGERY

## 2017-01-01 PROCEDURE — 80061 LIPID PANEL: CPT | Performed by: PHYSICIAN ASSISTANT

## 2017-01-01 PROCEDURE — 96360 HYDRATION IV INFUSION INIT: CPT

## 2017-01-01 PROCEDURE — 76010000149 HC OR TIME 1 TO 1.5 HR: Performed by: SURGERY

## 2017-01-01 PROCEDURE — 74011250636 HC RX REV CODE- 250/636: Performed by: NURSE ANESTHETIST, CERTIFIED REGISTERED

## 2017-01-01 PROCEDURE — 80048 BASIC METABOLIC PNL TOTAL CA: CPT | Performed by: EMERGENCY MEDICINE

## 2017-01-01 PROCEDURE — 85027 COMPLETE CBC AUTOMATED: CPT | Performed by: HOSPITALIST

## 2017-01-01 PROCEDURE — 36415 COLL VENOUS BLD VENIPUNCTURE: CPT | Performed by: SURGERY

## 2017-01-01 PROCEDURE — 77301 RADIOTHERAPY DOSE PLAN IMRT: CPT

## 2017-01-01 PROCEDURE — 71010 XR CHEST PORT: CPT

## 2017-01-01 PROCEDURE — 3336500001 HSPC ELECTION

## 2017-01-01 PROCEDURE — 74011250637 HC RX REV CODE- 250/637: Performed by: HOSPITALIST

## 2017-01-01 PROCEDURE — 76010000138 HC OR TIME 0.5 TO 1 HR: Performed by: SURGERY

## 2017-01-01 PROCEDURE — 74011250636 HC RX REV CODE- 250/636: Performed by: HOSPITALIST

## 2017-01-01 PROCEDURE — 77030013079 HC BLNKT BAIR HGGR 3M -A: Performed by: ANESTHESIOLOGY

## 2017-01-01 PROCEDURE — 96361 HYDRATE IV INFUSION ADD-ON: CPT

## 2017-01-01 PROCEDURE — 85025 COMPLETE CBC W/AUTO DIFF WBC: CPT | Performed by: SURGERY

## 2017-01-01 PROCEDURE — C1788 PORT, INDWELLING, IMP: HCPCS | Performed by: SURGERY

## 2017-01-01 PROCEDURE — 83605 ASSAY OF LACTIC ACID: CPT

## 2017-01-01 PROCEDURE — 77338 DESIGN MLC DEVICE FOR IMRT: CPT

## 2017-01-01 PROCEDURE — 77030028990 HC ADH TISS DERMFLX CHMP -B: Performed by: SURGERY

## 2017-01-01 PROCEDURE — 77336 RADIATION PHYSICS CONSULT: CPT

## 2017-01-01 PROCEDURE — 77030002996 HC SUT SLK J&J -A: Performed by: SURGERY

## 2017-01-01 PROCEDURE — 77030010507 HC ADH SKN DERMBND J&J -B: Performed by: SURGERY

## 2017-01-01 PROCEDURE — 70551 MRI BRAIN STEM W/O DYE: CPT

## 2017-01-01 PROCEDURE — 77300 RADIATION THERAPY DOSE PLAN: CPT

## 2017-01-01 PROCEDURE — 76210000006 HC OR PH I REC 0.5 TO 1 HR: Performed by: SURGERY

## 2017-01-01 PROCEDURE — 96372 THER/PROPH/DIAG INJ SC/IM: CPT

## 2017-01-01 PROCEDURE — 77001 FLUOROGUIDE FOR VEIN DEVICE: CPT

## 2017-01-01 PROCEDURE — 77030003028 HC SUT VCRL J&J -A: Performed by: SURGERY

## 2017-01-01 PROCEDURE — 80053 COMPREHEN METABOLIC PANEL: CPT | Performed by: HOSPITALIST

## 2017-01-01 PROCEDURE — 77062 BREAST TOMOSYNTHESIS BI: CPT

## 2017-01-01 PROCEDURE — 88300 SURGICAL PATH GROSS: CPT | Performed by: SURGERY

## 2017-01-01 PROCEDURE — 99001 SPECIMEN HANDLING PT-LAB: CPT | Performed by: FAMILY MEDICINE

## 2017-01-01 PROCEDURE — 77030002986 HC SUT PROL J&J -A: Performed by: SURGERY

## 2017-01-01 PROCEDURE — 36598 INJ W/FLUOR EVAL CV DEVICE: CPT

## 2017-01-01 PROCEDURE — 77030031139 HC SUT VCRL2 J&J -A: Performed by: SURGERY

## 2017-01-01 PROCEDURE — 90471 IMMUNIZATION ADMIN: CPT

## 2017-01-01 DEVICE — POWERPORT DUO M.R.I. IMPLANTABLE PORT WITH ATTACHABLE 9.5F CHRONOFLEX OPEN-ENDED DUAL-LUMEN VENOUS CATHETER. INTERMEDIATE KIT (WITH SUTURE PLUGS)
Type: IMPLANTABLE DEVICE | Site: CHEST | Status: FUNCTIONAL
Brand: POWERPORT M.R.I., CHRONOFLEX

## 2017-01-01 RX ORDER — METOPROLOL TARTRATE 50 MG/1
25 TABLET ORAL 2 TIMES DAILY
Qty: 60 TAB | Refills: 0 | Status: SHIPPED | OUTPATIENT
Start: 2017-01-01 | End: 2017-01-01

## 2017-01-01 RX ORDER — MAGNESIUM SULFATE 100 %
4 CRYSTALS MISCELLANEOUS AS NEEDED
Status: DISCONTINUED | OUTPATIENT
Start: 2017-01-01 | End: 2017-01-01 | Stop reason: SDUPTHER

## 2017-01-01 RX ORDER — DOCUSATE SODIUM 100 MG/1
100 CAPSULE, LIQUID FILLED ORAL 2 TIMES DAILY
Qty: 60 CAP | Refills: 2 | Status: SHIPPED | OUTPATIENT
Start: 2017-01-01 | End: 2017-01-01

## 2017-01-01 RX ORDER — BUPIVACAINE HYDROCHLORIDE AND EPINEPHRINE 2.5; 5 MG/ML; UG/ML
INJECTION, SOLUTION EPIDURAL; INFILTRATION; INTRACAUDAL; PERINEURAL AS NEEDED
Status: DISCONTINUED | OUTPATIENT
Start: 2017-01-01 | End: 2017-01-01 | Stop reason: HOSPADM

## 2017-01-01 RX ORDER — SODIUM CHLORIDE 0.9 % (FLUSH) 0.9 %
5-10 SYRINGE (ML) INJECTION AS NEEDED
Status: CANCELLED | OUTPATIENT
Start: 2017-01-01

## 2017-01-01 RX ORDER — BETAMETHASONE SODIUM PHOSPHATE AND BETAMETHASONE ACETATE 3; 3 MG/ML; MG/ML
6 INJECTION, SUSPENSION INTRA-ARTICULAR; INTRALESIONAL; INTRAMUSCULAR; SOFT TISSUE ONCE
Qty: 1 ML | Refills: 0
Start: 2017-01-01 | End: 2017-01-01

## 2017-01-01 RX ORDER — SODIUM CHLORIDE 0.9 % (FLUSH) 0.9 %
5-10 SYRINGE (ML) INJECTION AS NEEDED
Status: DISCONTINUED | OUTPATIENT
Start: 2017-01-01 | End: 2017-01-01 | Stop reason: HOSPADM

## 2017-01-01 RX ORDER — HYDROCODONE BITARTRATE AND ACETAMINOPHEN 5; 325 MG/1; MG/1
1 TABLET ORAL
Qty: 12 TAB | Refills: 0 | Status: SHIPPED | OUTPATIENT
Start: 2017-01-01 | End: 2017-01-01

## 2017-01-01 RX ORDER — FUROSEMIDE 40 MG/1
40 TABLET ORAL 2 TIMES DAILY
Qty: 60 TAB | Refills: 3 | Status: SHIPPED | OUTPATIENT
Start: 2017-01-01 | End: 2017-01-01 | Stop reason: SDUPTHER

## 2017-01-01 RX ORDER — FUROSEMIDE 40 MG/1
40 TABLET ORAL 2 TIMES DAILY
Status: DISCONTINUED | OUTPATIENT
Start: 2017-01-01 | End: 2017-01-01 | Stop reason: HOSPADM

## 2017-01-01 RX ORDER — DEXAMETHASONE 4 MG/1
2 TABLET ORAL EVERY 12 HOURS
Status: DISCONTINUED | OUTPATIENT
Start: 2017-01-01 | End: 2017-01-01 | Stop reason: HOSPADM

## 2017-01-01 RX ORDER — HYDROCODONE BITARTRATE AND ACETAMINOPHEN 5; 325 MG/1; MG/1
TABLET ORAL
Qty: 30 TAB | Refills: 0 | Status: ON HOLD | OUTPATIENT
Start: 2017-01-01 | End: 2017-01-01

## 2017-01-01 RX ORDER — SODIUM CHLORIDE 0.9 % (FLUSH) 0.9 %
5-10 SYRINGE (ML) INJECTION EVERY 8 HOURS
Status: CANCELLED | OUTPATIENT
Start: 2017-01-01

## 2017-01-01 RX ORDER — CEFAZOLIN SODIUM 2 G/50ML
2 SOLUTION INTRAVENOUS ONCE
Status: COMPLETED | OUTPATIENT
Start: 2017-01-01 | End: 2017-01-01

## 2017-01-01 RX ORDER — HYDROCODONE BITARTRATE AND ACETAMINOPHEN 5; 325 MG/1; MG/1
1 TABLET ORAL
Qty: 12 TAB | Refills: 0 | Status: SHIPPED | OUTPATIENT
Start: 2017-01-01

## 2017-01-01 RX ORDER — CITALOPRAM 20 MG/1
TABLET, FILM COATED ORAL
Qty: 30 TAB | Refills: 2 | Status: SHIPPED | OUTPATIENT
Start: 2017-01-01 | End: 2017-01-01 | Stop reason: SDUPTHER

## 2017-01-01 RX ORDER — IBUPROFEN 600 MG/1
600 TABLET ORAL
Qty: 90 TAB | Refills: 2 | Status: SHIPPED | OUTPATIENT
Start: 2017-01-01 | End: 2017-01-01

## 2017-01-01 RX ORDER — DEXAMETHASONE 2 MG/1
2 TABLET ORAL 2 TIMES DAILY WITH MEALS
Qty: 27 TAB | Refills: 0 | Status: SHIPPED | OUTPATIENT
Start: 2017-01-01 | End: 2017-10-31

## 2017-01-01 RX ORDER — INSULIN LISPRO 100 [IU]/ML
INJECTION, SOLUTION INTRAVENOUS; SUBCUTANEOUS ONCE
Status: DISCONTINUED | OUTPATIENT
Start: 2017-01-01 | End: 2017-01-01 | Stop reason: HOSPADM

## 2017-01-01 RX ORDER — DEXTROSE 50 % IN WATER (D50W) INTRAVENOUS SYRINGE
25-50 AS NEEDED
Status: DISCONTINUED | OUTPATIENT
Start: 2017-01-01 | End: 2017-01-01 | Stop reason: HOSPADM

## 2017-01-01 RX ORDER — ISOSORBIDE MONONITRATE 30 MG/1
30 TABLET, EXTENDED RELEASE ORAL DAILY
Status: DISCONTINUED | OUTPATIENT
Start: 2017-01-01 | End: 2017-01-01 | Stop reason: HOSPADM

## 2017-01-01 RX ORDER — DEXTROSE 50 % IN WATER (D50W) INTRAVENOUS SYRINGE
25-50 AS NEEDED
Status: DISCONTINUED | OUTPATIENT
Start: 2017-01-01 | End: 2017-01-01 | Stop reason: SDUPTHER

## 2017-01-01 RX ORDER — SPIRONOLACTONE 50 MG/1
TABLET, FILM COATED ORAL
Qty: 60 TAB | Refills: 2 | Status: SHIPPED | OUTPATIENT
Start: 2017-01-01 | End: 2017-01-01

## 2017-01-01 RX ORDER — PROPOFOL 10 MG/ML
INJECTION, EMULSION INTRAVENOUS AS NEEDED
Status: DISCONTINUED | OUTPATIENT
Start: 2017-01-01 | End: 2017-01-01 | Stop reason: HOSPADM

## 2017-01-01 RX ORDER — METOPROLOL TARTRATE 25 MG/1
12.5 TABLET, FILM COATED ORAL 2 TIMES DAILY
Qty: 60 TAB | Refills: 0 | Status: SHIPPED | OUTPATIENT
Start: 2017-01-01

## 2017-01-01 RX ORDER — SODIUM CHLORIDE 0.9 % (FLUSH) 0.9 %
5-10 SYRINGE (ML) INJECTION EVERY 8 HOURS
Status: DISCONTINUED | OUTPATIENT
Start: 2017-01-01 | End: 2017-01-01 | Stop reason: HOSPADM

## 2017-01-01 RX ORDER — METOPROLOL TARTRATE 50 MG/1
50 TABLET ORAL 2 TIMES DAILY
Qty: 60 TAB | Refills: 6 | Status: SHIPPED | OUTPATIENT
Start: 2017-01-01 | End: 2017-01-01 | Stop reason: SDUPTHER

## 2017-01-01 RX ORDER — RANITIDINE 150 MG/1
150 TABLET, FILM COATED ORAL 2 TIMES DAILY
Qty: 30 TAB | Refills: 0 | Status: SHIPPED | OUTPATIENT
Start: 2017-01-01 | End: 2017-01-01

## 2017-01-01 RX ORDER — THERA TABS 400 MCG
1 TAB ORAL DAILY
Qty: 30 TAB | Refills: 0 | Status: SHIPPED | OUTPATIENT
Start: 2017-01-01

## 2017-01-01 RX ORDER — CITALOPRAM 20 MG/1
20 TABLET, FILM COATED ORAL DAILY
Status: DISCONTINUED | OUTPATIENT
Start: 2017-01-01 | End: 2017-01-01 | Stop reason: HOSPADM

## 2017-01-01 RX ORDER — ASPIRIN 325 MG
325 TABLET ORAL
Status: COMPLETED | OUTPATIENT
Start: 2017-01-01 | End: 2017-01-01

## 2017-01-01 RX ORDER — FUROSEMIDE 40 MG/1
40 TABLET ORAL 2 TIMES DAILY
Qty: 60 TAB | Refills: 3 | Status: SHIPPED | OUTPATIENT
Start: 2017-01-01 | End: 2017-01-01

## 2017-01-01 RX ORDER — BETAMETHASONE SODIUM PHOSPHATE AND BETAMETHASONE ACETATE 3; 3 MG/ML; MG/ML
6 INJECTION, SUSPENSION INTRA-ARTICULAR; INTRALESIONAL; INTRAMUSCULAR; SOFT TISSUE ONCE
Qty: 1 VIAL | Refills: 0
Start: 2017-01-01 | End: 2017-01-01

## 2017-01-01 RX ORDER — DEXAMETHASONE 2 MG/1
2 TABLET ORAL 2 TIMES DAILY WITH MEALS
Qty: 27 TAB | Refills: 0 | Status: SHIPPED | OUTPATIENT
Start: 2017-01-01 | End: 2017-01-01

## 2017-01-01 RX ORDER — LIDOCAINE HYDROCHLORIDE 20 MG/ML
INJECTION, SOLUTION EPIDURAL; INFILTRATION; INTRACAUDAL; PERINEURAL AS NEEDED
Status: DISCONTINUED | OUTPATIENT
Start: 2017-01-01 | End: 2017-01-01 | Stop reason: HOSPADM

## 2017-01-01 RX ORDER — METOPROLOL TARTRATE 50 MG/1
TABLET ORAL
Qty: 60 TAB | Refills: 5 | Status: ON HOLD | OUTPATIENT
Start: 2017-01-01 | End: 2017-01-01

## 2017-01-01 RX ORDER — CITALOPRAM 20 MG/1
20 TABLET, FILM COATED ORAL DAILY
Qty: 30 TAB | Refills: 0 | Status: SHIPPED | OUTPATIENT
Start: 2017-01-01

## 2017-01-01 RX ORDER — ISOSORBIDE MONONITRATE 30 MG/1
TABLET, EXTENDED RELEASE ORAL
Qty: 30 TAB | Refills: 5 | Status: SHIPPED | OUTPATIENT
Start: 2017-01-01 | End: 2017-01-01 | Stop reason: SDUPTHER

## 2017-01-01 RX ORDER — SPIRONOLACTONE 50 MG/1
50 TABLET, FILM COATED ORAL 2 TIMES DAILY
Qty: 60 TAB | Refills: 3 | Status: SHIPPED | OUTPATIENT
Start: 2017-01-01 | End: 2017-01-01 | Stop reason: SDUPTHER

## 2017-01-01 RX ORDER — RANITIDINE 150 MG/1
150 TABLET, FILM COATED ORAL 2 TIMES DAILY
Status: DISCONTINUED | OUTPATIENT
Start: 2017-01-01 | End: 2017-01-01 | Stop reason: HOSPADM

## 2017-01-01 RX ORDER — FAMOTIDINE 20 MG/1
20 TABLET, FILM COATED ORAL ONCE
Status: COMPLETED | OUTPATIENT
Start: 2017-01-01 | End: 2017-01-01

## 2017-01-01 RX ORDER — CITALOPRAM 20 MG/1
20 TABLET, FILM COATED ORAL DAILY
Qty: 30 TAB | Refills: 3 | Status: SHIPPED | OUTPATIENT
Start: 2017-01-01 | End: 2017-01-01 | Stop reason: SDUPTHER

## 2017-01-01 RX ORDER — CITALOPRAM 20 MG/1
TABLET, FILM COATED ORAL
Qty: 30 TAB | Refills: 2 | Status: SHIPPED | OUTPATIENT
Start: 2017-01-01

## 2017-01-01 RX ORDER — SODIUM CHLORIDE 0.9 % (FLUSH) 0.9 %
5-10 SYRINGE (ML) INJECTION AS NEEDED
Status: DISCONTINUED | OUTPATIENT
Start: 2017-01-01 | End: 2017-01-01 | Stop reason: SDUPTHER

## 2017-01-01 RX ORDER — MAGNESIUM SULFATE 100 %
4 CRYSTALS MISCELLANEOUS AS NEEDED
Status: DISCONTINUED | OUTPATIENT
Start: 2017-01-01 | End: 2017-01-01 | Stop reason: HOSPADM

## 2017-01-01 RX ORDER — PROPOFOL 10 MG/ML
INJECTION, EMULSION INTRAVENOUS
Status: DISCONTINUED | OUTPATIENT
Start: 2017-01-01 | End: 2017-01-01 | Stop reason: HOSPADM

## 2017-01-01 RX ORDER — CHOLECALCIFEROL (VITAMIN D3) 125 MCG
2000 CAPSULE ORAL DAILY
Qty: 30 TAB | Refills: 0 | Status: SHIPPED | OUTPATIENT
Start: 2017-01-01

## 2017-01-01 RX ORDER — SODIUM CHLORIDE, SODIUM LACTATE, POTASSIUM CHLORIDE, CALCIUM CHLORIDE 600; 310; 30; 20 MG/100ML; MG/100ML; MG/100ML; MG/100ML
25 INJECTION, SOLUTION INTRAVENOUS CONTINUOUS
Status: DISCONTINUED | OUTPATIENT
Start: 2017-01-01 | End: 2017-01-01 | Stop reason: HOSPADM

## 2017-01-01 RX ORDER — KETAMINE HYDROCHLORIDE 100 MG/ML
INJECTION, SOLUTION INTRAMUSCULAR; INTRAVENOUS AS NEEDED
Status: DISCONTINUED | OUTPATIENT
Start: 2017-01-01 | End: 2017-01-01 | Stop reason: HOSPADM

## 2017-01-01 RX ORDER — MIDAZOLAM HYDROCHLORIDE 1 MG/ML
INJECTION, SOLUTION INTRAMUSCULAR; INTRAVENOUS AS NEEDED
Status: DISCONTINUED | OUTPATIENT
Start: 2017-01-01 | End: 2017-01-01 | Stop reason: HOSPADM

## 2017-01-01 RX ORDER — FENTANYL CITRATE 50 UG/ML
INJECTION, SOLUTION INTRAMUSCULAR; INTRAVENOUS AS NEEDED
Status: DISCONTINUED | OUTPATIENT
Start: 2017-01-01 | End: 2017-01-01 | Stop reason: HOSPADM

## 2017-01-01 RX ORDER — HYDROCODONE BITARTRATE AND ACETAMINOPHEN 5; 325 MG/1; MG/1
TABLET ORAL
Qty: 30 TAB | Refills: 0 | Status: SHIPPED | OUTPATIENT
Start: 2017-01-01 | End: 2017-01-01

## 2017-01-01 RX ORDER — IODIXANOL 320 MG/ML
1-50 INJECTION, SOLUTION INTRAVASCULAR
Status: DISCONTINUED | OUTPATIENT
Start: 2017-01-01 | End: 2017-01-01

## 2017-01-01 RX ORDER — METOPROLOL TARTRATE 50 MG/1
50 TABLET ORAL 2 TIMES DAILY
Status: DISCONTINUED | OUTPATIENT
Start: 2017-01-01 | End: 2017-01-01 | Stop reason: HOSPADM

## 2017-01-01 RX ORDER — UREA 10 %
2 LOTION (ML) TOPICAL 2 TIMES DAILY
Qty: 120 TAB | Refills: 3 | Status: SHIPPED | OUTPATIENT
Start: 2017-01-01 | End: 2017-01-01

## 2017-01-01 RX ORDER — THERA TABS 400 MCG
1 TAB ORAL DAILY
Qty: 30 TAB | Refills: 0 | Status: SHIPPED | OUTPATIENT
Start: 2017-01-01 | End: 2017-01-01

## 2017-01-01 RX ORDER — OXYCODONE AND ACETAMINOPHEN 5; 325 MG/1; MG/1
1 TABLET ORAL AS NEEDED
Status: DISCONTINUED | OUTPATIENT
Start: 2017-01-01 | End: 2017-01-01 | Stop reason: HOSPADM

## 2017-01-01 RX ORDER — HYDROCODONE BITARTRATE AND ACETAMINOPHEN 5; 325 MG/1; MG/1
TABLET ORAL
Qty: 30 TAB | Refills: 0 | Status: SHIPPED | OUTPATIENT
Start: 2017-01-01

## 2017-01-01 RX ORDER — CYCLOBENZAPRINE HCL 10 MG
10 TABLET ORAL
Qty: 30 TAB | Refills: 0 | Status: SHIPPED | OUTPATIENT
Start: 2017-01-01 | End: 2017-01-01

## 2017-01-01 RX ORDER — SPIRONOLACTONE 25 MG/1
50 TABLET ORAL 2 TIMES DAILY
Status: DISCONTINUED | OUTPATIENT
Start: 2017-01-01 | End: 2017-01-01 | Stop reason: HOSPADM

## 2017-01-01 RX ORDER — OXYCODONE AND ACETAMINOPHEN 5; 325 MG/1; MG/1
1-2 TABLET ORAL
Qty: 40 TAB | Refills: 0 | Status: SHIPPED | OUTPATIENT
Start: 2017-01-01 | End: 2017-01-01

## 2017-01-01 RX ORDER — RANITIDINE 150 MG/1
150 TABLET, FILM COATED ORAL 2 TIMES DAILY
Qty: 30 TAB | Refills: 0 | Status: SHIPPED | OUTPATIENT
Start: 2017-01-01

## 2017-01-01 RX ORDER — THERA TABS 400 MCG
1 TAB ORAL DAILY
Status: DISCONTINUED | OUTPATIENT
Start: 2017-01-01 | End: 2017-01-01 | Stop reason: HOSPADM

## 2017-01-01 RX ORDER — SODIUM CHLORIDE, SODIUM LACTATE, POTASSIUM CHLORIDE, CALCIUM CHLORIDE 600; 310; 30; 20 MG/100ML; MG/100ML; MG/100ML; MG/100ML
75 INJECTION, SOLUTION INTRAVENOUS CONTINUOUS
Status: DISCONTINUED | OUTPATIENT
Start: 2017-01-01 | End: 2017-01-01 | Stop reason: HOSPADM

## 2017-01-01 RX ORDER — CHOLECALCIFEROL (VITAMIN D3) 125 MCG
CAPSULE ORAL
Status: ON HOLD | COMMUNITY
End: 2017-01-01

## 2017-01-01 RX ORDER — ISOSORBIDE MONONITRATE 30 MG/1
TABLET, EXTENDED RELEASE ORAL
Qty: 30 TAB | Refills: 4 | Status: SHIPPED | OUTPATIENT
Start: 2017-01-01 | End: 2017-01-01

## 2017-01-01 RX ORDER — HEPARIN SODIUM (PORCINE) LOCK FLUSH IV SOLN 100 UNIT/ML 100 UNIT/ML
1000 SOLUTION INTRAVENOUS ONCE
Status: DISPENSED | OUTPATIENT
Start: 2017-01-01 | End: 2017-01-01

## 2017-01-01 RX ORDER — HYDROCODONE BITARTRATE AND ACETAMINOPHEN 5; 325 MG/1; MG/1
1 TABLET ORAL
Status: DISCONTINUED | OUTPATIENT
Start: 2017-01-01 | End: 2017-01-01 | Stop reason: HOSPADM

## 2017-01-01 RX ORDER — ONDANSETRON 2 MG/ML
4 INJECTION INTRAMUSCULAR; INTRAVENOUS ONCE
Status: DISCONTINUED | OUTPATIENT
Start: 2017-01-01 | End: 2017-01-01 | Stop reason: HOSPADM

## 2017-01-01 RX ORDER — ENOXAPARIN SODIUM 100 MG/ML
40 INJECTION SUBCUTANEOUS EVERY 24 HOURS
Status: DISCONTINUED | OUTPATIENT
Start: 2017-01-01 | End: 2017-01-01 | Stop reason: HOSPADM

## 2017-01-01 RX ORDER — DOCUSATE SODIUM 100 MG/1
100 CAPSULE, LIQUID FILLED ORAL 2 TIMES DAILY
Qty: 60 CAP | Refills: 0 | Status: SHIPPED | OUTPATIENT
Start: 2017-01-01 | End: 2017-01-01

## 2017-01-01 RX ADMIN — PROPOFOL 30 MG: 10 INJECTION, EMULSION INTRAVENOUS at 10:26

## 2017-01-01 RX ADMIN — FENTANYL CITRATE 50 MCG: 50 INJECTION, SOLUTION INTRAMUSCULAR; INTRAVENOUS at 10:41

## 2017-01-01 RX ADMIN — PROPOFOL 20 MG: 10 INJECTION, EMULSION INTRAVENOUS at 10:56

## 2017-01-01 RX ADMIN — CEFAZOLIN SODIUM 1 G: 2 SOLUTION INTRAVENOUS at 10:22

## 2017-01-01 RX ADMIN — KETAMINE HYDROCHLORIDE 10 MG: 100 INJECTION, SOLUTION INTRAMUSCULAR; INTRAVENOUS at 12:14

## 2017-01-01 RX ADMIN — SODIUM CHLORIDE, SODIUM LACTATE, POTASSIUM CHLORIDE, AND CALCIUM CHLORIDE: 600; 310; 30; 20 INJECTION, SOLUTION INTRAVENOUS at 11:24

## 2017-01-01 RX ADMIN — MIDAZOLAM HYDROCHLORIDE 2 MG: 1 INJECTION, SOLUTION INTRAMUSCULAR; INTRAVENOUS at 10:11

## 2017-01-01 RX ADMIN — PROPOFOL 30 MG: 10 INJECTION, EMULSION INTRAVENOUS at 10:31

## 2017-01-01 RX ADMIN — INFLUENZA VIRUS VACCINE 0.5 ML: 15; 15; 15; 15 SUSPENSION INTRAMUSCULAR at 16:55

## 2017-01-01 RX ADMIN — SODIUM CHLORIDE, SODIUM LACTATE, POTASSIUM CHLORIDE, AND CALCIUM CHLORIDE 75 ML/HR: 600; 310; 30; 20 INJECTION, SOLUTION INTRAVENOUS at 09:05

## 2017-01-01 RX ADMIN — FENTANYL CITRATE 25 MCG: 50 INJECTION, SOLUTION INTRAMUSCULAR; INTRAVENOUS at 10:25

## 2017-01-01 RX ADMIN — SODIUM CHLORIDE 1000 MG: 900 INJECTION, SOLUTION INTRAVENOUS at 11:04

## 2017-01-01 RX ADMIN — FENTANYL CITRATE 25 MCG: 50 INJECTION, SOLUTION INTRAMUSCULAR; INTRAVENOUS at 11:00

## 2017-01-01 RX ADMIN — LIDOCAINE HYDROCHLORIDE 60 MG: 20 INJECTION, SOLUTION EPIDURAL; INFILTRATION; INTRACAUDAL; PERINEURAL at 10:11

## 2017-01-01 RX ADMIN — IOPAMIDOL 100 ML: 612 INJECTION, SOLUTION INTRAVENOUS at 08:15

## 2017-01-01 RX ADMIN — PROPOFOL 30 MG: 10 INJECTION, EMULSION INTRAVENOUS at 10:47

## 2017-01-01 RX ADMIN — PROPOFOL 50 MCG/KG/MIN: 10 INJECTION, EMULSION INTRAVENOUS at 11:44

## 2017-01-01 RX ADMIN — MIDAZOLAM HYDROCHLORIDE 1 MG: 1 INJECTION, SOLUTION INTRAMUSCULAR; INTRAVENOUS at 11:24

## 2017-01-01 RX ADMIN — RANITIDINE HYDROCHLORIDE 150 MG: 150 TABLET, FILM COATED ORAL at 22:48

## 2017-01-01 RX ADMIN — SODIUM CHLORIDE, SODIUM LACTATE, POTASSIUM CHLORIDE, AND CALCIUM CHLORIDE 25 ML/HR: 600; 310; 30; 20 INJECTION, SOLUTION INTRAVENOUS at 10:38

## 2017-01-01 RX ADMIN — KETAMINE HYDROCHLORIDE 10 MG: 100 INJECTION, SOLUTION INTRAMUSCULAR; INTRAVENOUS at 11:57

## 2017-01-01 RX ADMIN — RANITIDINE HYDROCHLORIDE 150 MG: 150 TABLET, FILM COATED ORAL at 09:04

## 2017-01-01 RX ADMIN — GADOBUTROL 10 ML: 604.72 INJECTION INTRAVENOUS at 12:48

## 2017-01-01 RX ADMIN — PROPOFOL 30 MG: 10 INJECTION, EMULSION INTRAVENOUS at 10:38

## 2017-01-01 RX ADMIN — KETAMINE HYDROCHLORIDE 10 MG: 100 INJECTION, SOLUTION INTRAMUSCULAR; INTRAVENOUS at 12:16

## 2017-01-01 RX ADMIN — DEXAMETHASONE 2 MG: 4 TABLET ORAL at 09:10

## 2017-01-01 RX ADMIN — IOPAMIDOL 100 ML: 612 INJECTION, SOLUTION INTRAVENOUS at 11:00

## 2017-01-01 RX ADMIN — ASPIRIN 325 MG ORAL TABLET 325 MG: 325 PILL ORAL at 12:18

## 2017-01-01 RX ADMIN — GADOBUTROL 12 ML: 604.72 INJECTION INTRAVENOUS at 12:00

## 2017-01-01 RX ADMIN — SODIUM CHLORIDE 1000 ML: 900 INJECTION, SOLUTION INTRAVENOUS at 14:35

## 2017-01-01 RX ADMIN — CITALOPRAM HYDROBROMIDE 20 MG: 20 TABLET ORAL at 09:04

## 2017-01-01 RX ADMIN — Medication 1 CAPSULE: at 09:06

## 2017-01-01 RX ADMIN — CEFAZOLIN SODIUM 2 G: 2 SOLUTION INTRAVENOUS at 10:11

## 2017-01-01 RX ADMIN — FAMOTIDINE 20 MG: 20 TABLET ORAL at 09:05

## 2017-01-01 RX ADMIN — PROPOFOL 30 MG: 10 INJECTION, EMULSION INTRAVENOUS at 10:42

## 2017-01-01 RX ADMIN — PHENOBARBITAL 30 ML: 16.2; .1037; .0065; .0194 ELIXIR ORAL at 14:50

## 2017-01-01 RX ADMIN — THERA TABS 1 TABLET: TAB at 09:04

## 2017-01-01 RX ADMIN — FAMOTIDINE 20 MG: 20 TABLET ORAL at 10:38

## 2017-01-01 RX ADMIN — MIDAZOLAM HYDROCHLORIDE 1 MG: 1 INJECTION, SOLUTION INTRAMUSCULAR; INTRAVENOUS at 11:34

## 2017-01-01 RX ADMIN — ENOXAPARIN SODIUM 40 MG: 40 INJECTION SUBCUTANEOUS at 01:06

## 2017-01-01 RX ADMIN — RANITIDINE HYDROCHLORIDE 150 MG: 150 TABLET, FILM COATED ORAL at 14:50

## 2017-01-01 RX ADMIN — IOPAMIDOL 100 ML: 612 INJECTION, SOLUTION INTRAVENOUS at 14:00

## 2017-01-01 RX ADMIN — SODIUM CHLORIDE 1000 ML: 900 INJECTION, SOLUTION INTRAVENOUS at 14:34

## 2017-03-22 NOTE — LETTER
Jade Aguilar 1955 
 
3/22/2017 Dear Ary Amaya MD 
 
I had the pleasure of evaluating  Ms. Ej Bear in office today. Below are the relevant portions of my assessment and plan of care. ICD-10-CM ICD-9-CM 1. Benign hypertensive heart disease without heart failure I11.9 402.10   
 stable 
controlled bp 2. Angina decubitus (HCC) I20.8 413.0   
 stable on imdur 
no significant cad on cath 3. Cardiomegaly I51.7 429.3   
 lvef normal post chemo 4. VIRGINIE on CPAP G47.33 327.23   
 stable Current Outpatient Prescriptions Medication Sig Dispense Refill  MULTIVIT-MINERALS/FERROUS FUM (MULTI VITAMIN PO) Take  by mouth.  cholecalciferol, vitamin D3, (VITAMIN D3) 2,000 unit tab Take  by mouth.  isosorbide mononitrate ER (IMDUR) 30 mg tablet TAKE 1 TABLET BY MOUTH EVERY MORNING. 30 Tab 5  
 metoprolol tartrate (LOPRESSOR) 50 mg tablet Take 1 Tab by mouth two (2) times a day. 60 Tab 6  
 acetaminophen (TYLENOL) 325 mg tablet Take  by mouth every four (4) hours as needed for Pain.  oxyCODONE-acetaminophen (PERCOCET) 5-325 mg per tablet 1 or 2 tablets by mouth every 4 hours as needed 40 Tab 0  
 citalopram (CELEXA) 20 mg tablet Take  by mouth daily.  spironolactone (ALDACTONE) 50 mg tablet Take 50 mg by mouth two (2) times a day.  furosemide (LASIX) 40 mg tablet Take 40 mg by mouth two (2) times a day. Orders Placed This Encounter  MULTIVIT-MINERALS/FERROUS FUM (MULTI VITAMIN PO) Sig: Take  by mouth.  cholecalciferol, vitamin D3, (VITAMIN D3) 2,000 unit tab Sig: Take  by mouth. If you have questions, please do not hesitate to call me. I look forward to following Ms. Ej Bear along with you. Sincerely, Carolyn Garcia MD

## 2017-03-22 NOTE — MR AVS SNAPSHOT
Visit Information Date & Time Provider Department Dept. Phone Encounter #  
 3/22/2017  9:00 AM Katherine Augustin MD [facility] [address] [facility] [id number] Follow-up Instructions Return in about 6 months (around 9/22/2017). Your Appointments 3/23/2017  7:50 AM  
Follow Up with Kalpesh Ingram MD  
[address] [facility], [address] [facility] [address]) Appt Note: 3 M RT KNEE  
 [address] [facility], [address]  
[phone]  
  
   
 [address] [facility], [address] [facility] [address]  
  
    
 3/29/2017  1:30 PM  
Follow Up with Abdiaziz Miles MD  
[facility] ([address]) Appt Note: Dr Stephon Stark referring for mediport removal/Dr Pascual Pacheco pt  
 [address] [address] [facility] [address] [facility] [address] [facility] 9/13/2017  9:15 AM  
ESTABLISHED PATIENT with Katherine Augustin MD  
[facility]) Appt Note: 6 months [address] [facility], [address] [facility] [address]  
[address], [address] [facility] [address] [facility] Upcoming Health Maintenance Date Due Hepatitis C Screening 1955 Pneumococcal 19-64 Highest Risk (1 of 3 - PCV13) 12/24/1972 DTaP/Tdap/Td series (1 - Tdap) 12/24/1974 FOBT Q 1 YEAR AGE 50-75 12/24/2003 ZOSTER VACCINE AGE 60> 12/24/2013 PAP AKA CERVICAL CYTOLOGY 10/24/2014 INFLUENZA AGE 9 TO ADULT 8/1/2016 BREAST CANCER SCRN MAMMOGRAM 3/14/2019 Allergies as of 3/22/2017  Review Complete On: 3/22/2017 By: Katherine Augustin MD  
  
 Severity Noted Reaction Type Reactions Pcn [Penicillins]  01/16/2013    Rash Current Immunizations  Never Reviewed No immunizations on file. Not reviewed this visit You Were Diagnosed With   
  
 Codes Comments Benign hypertensive heart disease without heart failure    -  Primary ICD-10-CM: I11.9 ICD-9-CM: 402.10 stable 
controlled bp Angina decubitus ([address]     ICD-10-CM: I20.8 ICD-9-CM: 413.0 stable on imdur 
no significant cad on cath Cardiomegaly     ICD-10-CM: I51.7 ICD-9-CM: 429.3 lvef normal post chemo VIRGINIE on CPAP     ICD-10-CM: G47.33 
ICD-9-CM: 327.23 stable Vitals BP Pulse Height(growth percentile) Weight(growth percentile) BMI OB Status 114/54 90 5' 3\" (1.6 m) 266 lb (120.7 kg) 47.12 kg/m2 Postmenopausal  
 Smoking Status Former Smoker Vitals History BMI and BSA Data Body Mass Index Body Surface Area  
 47.12 kg/m 2 2.32 m 2 Preferred Pharmacy Pharmacy Name [id number] [address] [phone] Your Updated Medication List  
  
   
This list is accurate as of: 3/22/17  9:40 AM.  Always use your most recent med list.  
  
  
  
  
 acetaminophen 325 mg tablet Commonly known as:  TYLENOL Take  by mouth every four (4) hours as needed for Pain. citalopram 20 mg tablet Commonly known as:  Magdalena Julio César Take  by mouth daily. isosorbide mononitrate ER 30 mg tablet Commonly known as:  IMDUR  
TAKE 1 TABLET BY MOUTH EVERY MORNING. LASIX 40 mg tablet Generic drug:  furosemide Take 40 mg by mouth two (2) times a day. metoprolol tartrate 50 mg tablet Commonly known as:  LOPRESSOR Take 1 Tab by mouth two (2) times a day. MULTI VITAMIN PO Take  by mouth. oxyCODONE-acetaminophen 5-325 mg per tablet Commonly known as:  PERCOCET  
1 or 2 tablets by mouth every 4 hours as needed  
  
 spironolactone 50 mg tablet Commonly known as:  ALDACTONE Take 50 mg by mouth two (2) times a day. VITAMIN D3 2,000 unit Tab Generic drug:  cholecalciferol (vitamin D3) Take  by mouth. Follow-up Instructions Return in about 6 months (around 9/22/2017). Introducing [facility]! Dear Rodger Peacock: 
Thank you for requesting a Moolta account. Our records indicate that you already have an active Moolta account. You can access your account anytime at [email or web address] Did you know that you can access your hospital and ER discharge instructions at any time in M[employer]ta? You can also review all of your test results from your hospital stay or ER visit. Additional Information If you have questions, please visit the Frequently Asked Questions section of the Moolta website at [email or web address] Remember, Moolta is NOT to be used for urgent needs. For medical emergencies, dial 911. Now available from your iPhone and Android! Please provide this summary of care documentation to your next provider. Your primary care clinician is listed as Shanita Lenz. If you have any questions after today's visit, please call [phone].

## 2017-03-22 NOTE — PROGRESS NOTES
HISTORY OF PRESENT ILLNESS  Mich Seymour is a 59 y.o. female. HPI Comments:       Hypertension   The history is provided by the patient. This is a chronic problem. The problem occurs constantly. The problem has not changed since onset. Pertinent negatives include no chest pain and no shortness of breath. Shortness of Breath   The history is provided by the patient. This is a chronic problem. The problem occurs rarely. The problem has been rapidly improving. Pertinent negatives include no fever, no cough, no sputum production, no hemoptysis, no wheezing, no PND, no orthopnea, no chest pain, no vomiting, no rash, no leg swelling and no claudication. Precipitated by: exertion. Review of Systems   Constitutional: Negative for chills and fever. HENT: Negative for nosebleeds. Eyes: Negative for blurred vision and double vision. Respiratory: Negative for cough, hemoptysis, sputum production, shortness of breath and wheezing. Cardiovascular: Negative for chest pain, palpitations, orthopnea, claudication, leg swelling and PND. Gastrointestinal: Negative for heartburn, nausea and vomiting. Musculoskeletal: Negative for myalgias. Skin: Negative for rash. Neurological: Negative for dizziness and weakness. Endo/Heme/Allergies: Does not bruise/bleed easily.      Family History   Problem Relation Age of Onset    Arthritis-osteo Mother     Hypertension Mother     Arthritis-osteo Maternal Grandmother     Heart Disease Maternal Grandmother     Hypertension Maternal Grandmother     Arthritis-osteo Paternal Grandmother     Cancer Paternal Grandmother     Heart Disease Paternal Grandmother     Hypertension Paternal Grandmother     Arthritis-osteo Father     Cancer Father     Heart Disease Father     Hypertension Father     Lung Disease Father     Stroke Father     Cancer Paternal Grandfather     Lung Disease Paternal Grandfather     Stroke Paternal Grandfather     Hypertension Maternal Grandfather     Stroke Maternal Grandfather        Past Medical History:   Diagnosis Date    Abnormal nuclear cardiac imaging test 9/22/2014    exertional chest tightness foe a few weeks progressively worse associated with sob     Arthritis     Asthma     Benign hypertensive heart disease without heart failure 9/22/2014    exertional chest tightness foe a few weeks progressively worse associated with sob     Cancer ([address] 12/5/14    Cancer ([address] 4-4-16    lymph nodes    Cardiomegaly 9/22/2014    exertional chest tightness foe a few weeks progressively worse associated with sob     Congestive heart failure, unspecified     Depression 01/1989    GERD (gastroesophageal reflux disease)     GERD (gastroesophageal reflux disease)     Heart failure ([address]     Hypertension     Intermediate coronary syndrome ([address] 9/22/2014    increasing chest tightness and sob     Irregular heart beat     Palpitations    Obesity, unspecified 9/22/2014    exertional chest tightness foe a few weeks progressively worse associated with sob     Other and unspecified angina pectoris ([address] 9/22/2014    exertional chest tightness foe a few weeks progressively worse associated with sob     Other and unspecified angina pectoris ([address] 9/22/2014    exertional chest tightness foe a few weeks progressively worse associated with sob     Sleep apnea     Unspecified sleep apnea     cpap       Past Surgical History:   Procedure Laterality Date    BIOPSY OF BREAST, INCISIONAL      LT BENIGN, 15 YRS.  AGO    CARDIAC SURG PROCEDURE UNLIST      HX BREAST BIOPSY Left     HX BREAST BIOPSY Right 12/5/2014    RIGHT BREAST BIOPSY WITH NEEDLE LOCALIZATION ULTRASOUND TIMES TWO performed by Dot Hung MD at [address] [place] [address] HX BREAST BIOPSY Right 2/11/2015    RIGHT BREAST EXPLORATION performed by Dot Hung MD at [facility] MAIN OR    HX CATARACT REMOVAL      HX COLONOSCOPY      HX GYN      Laparoscopy    HX MASTECTOMY Right 2/4/2015    RIGHT PARTIAL MASTECTOMY/SENTINEL LYMPH NODE BIOPSY/POSSIBLE AXILLARY LYMPH NODE DISECTION performed by Lefty Son MD at [address] [place] [address] HX MASTECTOMY Right 3/25/2016    RIGHT AXILLARY LYMPH NODE DISECTION/RIGHT PARTIAL MASTECTOMY performed by Lefty Son MD at [facility] MAIN OR    HX ORTHOPAEDIC Right     bunionectomy    WI INSJ TUNNELED CTR VAD W/SUBQ PORT AGE 5 YR/>  5-4-16    Dr. Daylin Lu       Social History   Substance Use Topics    Smoking status: Former Smoker     Quit date: 9/22/1994    Smokeless tobacco: Never Used    Alcohol use Yes      Comment: occasionally       Allergies   Allergen Reactions    Pcn [Penicillins] Rash       Current Outpatient Prescriptions   Medication Sig    MULTIVIT-MINERALS/FERROUS FUM (MULTI VITAMIN PO) Take  by mouth.  cholecalciferol, vitamin D3, (VITAMIN D3) 2,000 unit tab Take  by mouth.  isosorbide mononitrate ER (IMDUR) 30 mg tablet TAKE 1 TABLET BY MOUTH EVERY MORNING.  metoprolol tartrate (LOPRESSOR) 50 mg tablet Take 1 Tab by mouth two (2) times a day.  acetaminophen (TYLENOL) 325 mg tablet Take  by mouth every four (4) hours as needed for Pain.  oxyCODONE-acetaminophen (PERCOCET) 5-325 mg per tablet 1 or 2 tablets by mouth every 4 hours as needed    citalopram (CELEXA) 20 mg tablet Take  by mouth daily.  spironolactone (ALDACTONE) 50 mg tablet Take 50 mg by mouth two (2) times a day.  furosemide (LASIX) 40 mg tablet Take 40 mg by mouth two (2) times a day. No current facility-administered medications for this visit. Visit Vitals    /54    Pulse 90    Ht 5' 3\" (1.6 m)    Wt 120.7 kg (266 lb)    BMI 47.12 kg/m2         Physical Exam   Constitutional: She is oriented to person, place, and time. She appears well-developed and well-nourished. HENT:   Head: Normocephalic and atraumatic. Eyes: Conjunctivae are normal.   Neck: Neck supple. No JVD present. No tracheal deviation present. No thyromegaly present.    Cardiovascular: Normal rate and regular rhythm. PMI is not displaced. Exam reveals no gallop, no S3 and no decreased pulses. No murmur heard. Pulmonary/Chest: No respiratory distress. She has no wheezes. She has no rales. She exhibits no tenderness. Abdominal: Soft. There is no tenderness. Musculoskeletal: She exhibits no edema. Neurological: She is alert and oriented to person, place, and time. Skin: Skin is warm. Psychiatric: She has a normal mood and affect. Ms. Jodi Howe has a reminder for a \"due or due soon\" health maintenance. I have asked that she contact her primary care provider for follow-up on this health maintenance. DIAGNOSES: 9/2014  1. Abnormal scan with evidence of a moderate area of fixed defect of   anterior wall noted from his nuclear study which is associated with normal   wall motion and systolic function which could be prolonged   ischemia, although tissue attenuation is also possible as cause of this   defect. 2. Anteroseptal wall motion abnormality seen on gated study. Does not   correlate with any perfusion abnormality. Clinical correlation is advised. SUMMARY:echo:9/2014  Left ventricle: Systolic function was normal by visual assessment. Ejection fraction was estimated in the range of 55 % to 60 %. No obvious  wall motion abnormalities identified in the views obtained. Wall thickness  was mildly to moderately increased. There was moderate concentric  hypertrophy. Doppler parameters were consistent with abnormal left  ventricular relaxation (grade 1 diastolic dysfunction). Right ventricle: Systolic pressure was at the upper limits of normal.  Estimated peak pressure was 30 mmHg. IMPRESSION 10/2014  1. Normal left ventricular size, wall motion and systolic function. 2. No significant epicardial coronary artery disease. 3. Slow flow noted in all coronary arteries with first injection.   Echo:4/2016  Normal lvef 65%  5/2016-echo  Normal lvef-60%  Mild lvh    6/2016  Echo-ef normal  7/15/2016  Echo-normal lvef  No change    8/10/2016-echo  Ef normal     SUMMARY:echo-9/2016  Left ventricle: Systolic function was normal. Ejection fraction was  estimated to be 60 %. No obvious wall motion abnormalities identified in  the views obtained. There was mild concentric hypertrophy. Aortic valve: The valve was trileaflet. Leaflets exhibited mildly  increased thickness, normal cuspal separation, and sclerosis. Tricuspid valve: Pulmonary artery systolic pressure: 28 mmHg. Assessment       ICD-10-CM ICD-9-CM    1. Benign hypertensive heart disease without heart failure I11.9 402.10     stable  controlled bp   2. Angina decubitus (HCC) I20.8 413.0     stable on imdur  no significant cad on cath   3. Cardiomegaly I51.7 429.3     lvef normal post chemo   4. VIRGINIE on CPAP G47.33 327.23     stable         Medications Discontinued During This Encounter   Medication Reason    GLUCOSAMINE HCL/CHONDR CORDOVA A NA (OSTEO BI-FLEX PO) Not A Current Medication    ondansetron hcl (ZOFRAN) 8 mg tablet Not A Current Medication       No orders of the defined types were placed in this encounter. Follow-up Disposition:  Return in about 6 months (around 9/22/2017).

## 2017-03-22 NOTE — PROGRESS NOTES
1. Have you been to the ER, urgent care clinic since your last visit? Hospitalized since your last visit? No    2. Have you seen or consulted any other health care providers outside of the [address] [place] [address] since your last visit? Include any pap smears or colon screening. No     3. Since your last visit, have you had any of the following symptoms? None     4. Have you had any blood work, X-rays or cardiac testing? Yes in [facility]    5. Where do you normally have your labs drawn? [facility]    6. Do you need any refills today?  No     Patient confirmed medication verbally today

## 2017-03-23 NOTE — PATIENT INSTRUCTIONS
Today you have received an injection to the right knee. Please review the following post injection care. Return to the office in 3 months and as needed. Joint Injections: Care Instructions  Your Care Instructions  Joint injections are shots into a joint, such as the knee. They may be used to put in medicines, such as pain relievers. Or they can be used to take out fluid. Sometimes the fluid is tested in a lab. This can help find the cause of a joint problem. A corticosteroid, or steroid, shot is used to reduce inflammation in tendons or joints. It is often used to treat problems such as arthritis, tendinitis, and bursitis. Steroids can be injected directly into a painful, inflamed joint. They can also help reduce inflammation of a bursa. A bursa is a sac of fluid. It cushions and lubricates areas where tendons, ligaments, skin, muscles, or bones rub against each other. A steroid shot can sometimes help with short-term pain relief when other treatments haven't worked. If steroid shots help, pain may improve for weeks or months. Follow-up care is a key part of your treatment and safety. Be sure to make and go to all appointments, and call your doctor if you are having problems. It's also a good idea to know your test results and keep a list of the medicines you take. How can you care for yourself at home? · Put ice or a cold pack on the area for 10 to 20 minutes at a time. Put a thin cloth between the ice and your skin. · Take anti-inflammatory medicines to reduce pain, swelling, or inflammation. These include ibuprofen (Advil, Motrin) and naproxen (Aleve). Read and follow all instructions on the label. · Avoid strenuous activities for several days, especially those that put stress on the area where you got the shot. · If you have dressings over the area, keep them clean and dry. You may remove them when your doctor tells you to. When should you call for help?   Call your doctor now or seek immediate medical care if:  · You have signs of infection, such as:  ¨ Increased pain, swelling, warmth, or redness. ¨ Red streaks leading from the site. ¨ Pus draining from the site. ¨ A fever. Watch closely for changes in your health, and be sure to contact your doctor if you have any problems. Where can you learn more? Go to http://lis-barry.info/. Enter N616 in the search box to learn more about \"Joint Injections: Care Instructions. \"  Current as of: May 23, 2016  Content Version: 11.1  © 9822-8354 [employer]. Care instructions adapted under license by [facility] (which disclaims liability or warranty for this information). If you have questions about a medical condition or this instruction, always ask your healthcare professional. [address] any warranty or liability for your use of this information.

## 2017-03-23 NOTE — PROGRESS NOTES
Patient: Vaishali Rainey                MRN: [id number]       SSN: [id number]  YOB: 1955        AGE: 59 y.o. SEX: female  Body mass index is 47.3 kg/(m^2). PCP: Sahnita Lenz MD  03/23/17  Ms. Lafleur is here today complaining of right knee pain, medial posterior and also over the pes anserinus. The injections usually help to a fair degree. She is requesting an injection today. Her pain can be moderate. She has some night pain as well. She has difficulty with stairs, kneeling, and getting up and down from a chair. She also suffers from obesity and has a BMI of 47. All systems were reviewed and updated on the EMR. Systems reviewed are negative. EXAMINATION:  She is a very nice lady. She has a mildly antalgic gait. She has varus malalignment. She has 2° fixed flexion deformity. She bends well. The calf is nontender. Homans sign is negative. She has just minimal peripheral edema, just slight. Sensation is grossly intact. Hips rotate nicely. Both feet are warm and well perfused. She has tenderness over the pes anserinus and joint line tenderness in all three compartments involving the knee itself. There is no evidence for infection or DVT. PLAN:  Under aseptic conditions after informed and written consent with timeouts a 1:1 preparation of Celestone was injected into the pes anserinus and 1 cc of Celestone was injected intraarticular, which was very well tolerated. She will return to see us in three to four months. I have recommended some gentle weight loss. We would like to get her BMI a little closer to 40 prior to having joint replacement surgery. Hopefully, she is a year or two away from this as well.             REVIEW OF SYSTEMS:      CON: negative for weight loss, fever  EYE: negative for double vision  ENT: negative for hoarseness  RS:   negative for Tb  GI:    negative for blood in stool  :  negative for blood in urine  Other systems reviewed and noted below.           Past Medical History:   Diagnosis Date    Abnormal nuclear cardiac imaging test 9/22/2014    exertional chest tightness foe a few weeks progressively worse associated with sob     Arthritis     Asthma     Benign hypertensive heart disease without heart failure 9/22/2014    exertional chest tightness foe a few weeks progressively worse associated with sob     Cancer ([address] 12/5/14    Cancer ([address] 4-4-16    lymph nodes    Cardiomegaly 9/22/2014    exertional chest tightness foe a few weeks progressively worse associated with sob     Congestive heart failure, unspecified     Depression 01/1989    GERD (gastroesophageal reflux disease)     GERD (gastroesophageal reflux disease)     Heart failure ([address]     Hypertension     Intermediate coronary syndrome ([address] 9/22/2014    increasing chest tightness and sob     Irregular heart beat     Palpitations    Obesity, unspecified 9/22/2014    exertional chest tightness foe a few weeks progressively worse associated with sob     Other and unspecified angina pectoris ([address] 9/22/2014    exertional chest tightness foe a few weeks progressively worse associated with sob     Other and unspecified angina pectoris ([address] 9/22/2014    exertional chest tightness foe a few weeks progressively worse associated with sob     Sleep apnea     Unspecified sleep apnea     cpap       Family History   Problem Relation Age of Onset    Arthritis-osteo Mother     Hypertension Mother     Arthritis-osteo Maternal Grandmother     Heart Disease Maternal Grandmother     Hypertension Maternal Grandmother     Arthritis-osteo Paternal Grandmother     Cancer Paternal Grandmother     Heart Disease Paternal Grandmother     Hypertension Paternal Grandmother     Arthritis-osteo Father     Cancer Father     Heart Disease Father     Hypertension Father     Lung Disease Father     Stroke Father     Cancer Paternal Grandfather     Lung Disease Paternal Grandfather     Stroke Paternal Grandfather     Hypertension Maternal Grandfather     Stroke Maternal Grandfather        Current Outpatient Prescriptions   Medication Sig Dispense Refill    betamethasone (CELESTONE SOLUSPAN) 6 mg/mL injection 1 mL by Intra artICUlar route once for 1 dose. 1 Vial 0    MULTIVIT-MINERALS/FERROUS FUM (MULTI VITAMIN PO) Take  by mouth.  cholecalciferol, vitamin D3, (VITAMIN D3) 2,000 unit tab Take  by mouth.  metoprolol tartrate (LOPRESSOR) 50 mg tablet Take 1 Tab by mouth two (2) times a day. 60 Tab 6    acetaminophen (TYLENOL) 325 mg tablet Take  by mouth every four (4) hours as needed for Pain.  citalopram (CELEXA) 20 mg tablet Take  by mouth daily.  spironolactone (ALDACTONE) 50 mg tablet Take 50 mg by mouth two (2) times a day.  furosemide (LASIX) 40 mg tablet Take 40 mg by mouth two (2) times a day.  isosorbide mononitrate ER (IMDUR) 30 mg tablet TAKE 1 TABLET BY MOUTH EVERY MORNING. 30 Tab 5    oxyCODONE-acetaminophen (PERCOCET) 5-325 mg per tablet 1 or 2 tablets by mouth every 4 hours as needed 40 Tab 0       Allergies   Allergen Reactions    Pcn [Penicillins] Rash       Past Surgical History:   Procedure Laterality Date    BIOPSY OF BREAST, INCISIONAL      LT BENIGN, 15 YRS.  AGO    CARDIAC SURG PROCEDURE UNLIST      HX BREAST BIOPSY Left     HX BREAST BIOPSY Right 12/5/2014    RIGHT BREAST BIOPSY WITH NEEDLE LOCALIZATION ULTRASOUND TIMES TWO performed by Celine Mazariegos MD at [address] [place] [address] HX BREAST BIOPSY Right 2/11/2015    RIGHT BREAST EXPLORATION performed by Celine Mazariegos MD at [facility] MAIN OR    HX CATARACT REMOVAL      HX COLONOSCOPY      HX GYN      Laparoscopy    HX MASTECTOMY Right 2/4/2015    RIGHT PARTIAL MASTECTOMY/SENTINEL LYMPH NODE BIOPSY/POSSIBLE AXILLARY LYMPH NODE DISECTION performed by Celine Mazariegos MD at [address] [place] [address] HX MASTECTOMY Right 3/25/2016    RIGHT AXILLARY LYMPH NODE DISECTION/RIGHT PARTIAL MASTECTOMY performed by May Sandhu MD at [facility] MAIN OR    HX ORTHOPAEDIC Right     bunionectomy    MA INSJ TUNNELED CTR VAD W/SUBQ PORT AGE 5 YR/>  5-4-16    Dr. Adali Hathaway Marital status: SINGLE     Spouse name: N/A    Number of children: N/A    Years of education: N/A     Occupational History    Not on file. Social History Main Topics    Smoking status: Former Smoker     Quit date: 9/22/1994    Smokeless tobacco: Never Used    Alcohol use Yes      Comment: occasionally    Drug use: No    Sexual activity: Not Currently     Partners: Male     Birth control/ protection: Surgical     Other Topics Concern    Not on file     Social History Narrative       Visit Vitals    /65 (BP 1 Location: Left arm, BP Patient Position: Sitting)    Pulse 70    Temp 96.1 °F (35.6 °C) (Oral)    Wt 267 lb (121.1 kg)    BMI 47.3 kg/m2         PHYSICAL EXAMINATION:  GENERAL: Alert and oriented x3, in no acute distress, well-developed, well-nourished, afebrile. HEART: No JVD. EYES: No scleral icterus   NECK: No significant lymphadenopathy   LUNGS: No respiratory compromise or indrawing  ABDOMEN: Soft, non-tender, non-distended. Electronically signed by:  Meseret De La Cruz MD

## 2017-03-23 NOTE — MR AVS SNAPSHOT
Visit Information Date & Time Provider Department Dept. Phone Encounter #  
 3/23/2017  7:50 AM Griselda Handing, [address] [facility] [phone] [id number] Your Appointments 3/29/2017  1:30 PM  
Follow Up with MD DONIS [facility] ([address]) Appt Note: Dr Zahida Sanchez referring for mediport removal/Dr STARKS [facility] pt  
 [address] [place] [id number] [address] 9/13/2017  9:15 AM  
ESTABLISHED PATIENT with Christine Velazco MD  
[facility]) Appt Note: 6 months [address] [facility] [facility], [address] [facility] [id number] [facility], [address] Upcoming Health Maintenance Date Due Hepatitis C Screening 1955 Pneumococcal 19-64 Highest Risk (1 of 3 - PCV13) 12/24/1972 DTaP/Tdap/Td series (1 - Tdap) 12/24/1974 FOBT Q 1 YEAR AGE 50-75 12/24/2003 ZOSTER VACCINE AGE 60> 12/24/2013 PAP AKA CERVICAL CYTOLOGY 10/24/2014 INFLUENZA AGE 9 TO ADULT 8/1/2016 BREAST CANCER SCRN MAMMOGRAM 3/14/2019 Allergies as of 3/23/2017  Review Complete On: 3/23/2017 By: Griselda Handing, MD  
  
 Severity Noted Reaction Type Reactions Pcn [Penicillins]  01/16/2013    Rash Current Immunizations  Never Reviewed No immunizations on file. Not reviewed this visit You Were Diagnosed With   
  
 Codes Comments Primary osteoarthritis of right knee    -  Primary ICD-10-CM: M17.11 ICD-9-CM: 715.16 Vitals BP Pulse Temp Weight(growth percentile) BMI OB Status 121/65 (BP 1 Location: Left arm, BP Patient Position: Sitting) 70 96.1 °F (35.6 °C) (Oral) 267 lb (121.1 kg) 47.3 kg/m2 Postmenopausal  
 Smoking Status Former Smoker BMI and BSA Data Body Mass Index Body Surface Area  
 47.3 kg/m 2 2.32 m 2 Preferred Pharmacy Pharmacy Name Phone [id number] [place] [address] [address]. [phone] Your Updated Medication List  
  
   
This list is accurate as of: 3/23/17  9:09 AM.  Always use your most recent med list.  
  
  
  
  
 acetaminophen 325 mg tablet Commonly known as:  TYLENOL Take  by mouth every four (4) hours as needed for Pain. betamethasone 6 mg/mL injection Commonly known as:  CELESTONE SOLUSPAN  
1 mL by Intra artICUlar route once for 1 dose. citalopram 20 mg tablet Commonly known as:  [place] Party Take  by mouth daily. isosorbide mononitrate ER 30 mg tablet Commonly known as:  IMDUR  
TAKE 1 TABLET BY MOUTH EVERY MORNING. LASIX 40 mg tablet Generic drug:  furosemide Take 40 mg by mouth two (2) times a day. metoprolol tartrate 50 mg tablet Commonly known as:  LOPRESSOR Take 1 Tab by mouth two (2) times a day. MULTI VITAMIN PO Take  by mouth. oxyCODONE-acetaminophen 5-325 mg per tablet Commonly known as:  PERCOCET  
1 or 2 tablets by mouth every 4 hours as needed  
  
 spironolactone 50 mg tablet Commonly known as:  ALDACTONE Take 50 mg by mouth two (2) times a day. VITAMIN D3 2,000 unit Tab Generic drug:  cholecalciferol (vitamin D3) Take  by mouth. We Performed the Following BETAMETHASONE ACETATE & SODIUM PHOSPHATE INJECTION 3 MG EA. [ [facility]] [place] DRAIN/INJECT LARGE JOINT/BURSA [id number] CPT(R)] Patient Instructions Today you have received an injection to the right knee. Please review the following post injection care. Return to the office in 3 months and as needed. Joint Injections: Care Instructions Your Care Instructions Joint injections are shots into a joint, such as the knee. They may be used to put in medicines, such as pain relievers.  Or they can be used to take out fluid. Sometimes the fluid is tested in a lab. This can help find the cause of a joint problem. A corticosteroid, or steroid, shot is used to reduce inflammation in tendons or joints. It is often used to treat problems such as arthritis, tendinitis, and bursitis. Steroids can be injected directly into a painful, inflamed joint. They can also help reduce inflammation of a bursa. A bursa is a sac of fluid. It cushions and lubricates areas where tendons, ligaments, skin, muscles, or bones rub against each other. A steroid shot can sometimes help with short-term pain relief when other treatments haven't worked. If steroid shots help, pain may improve for weeks or months. Follow-up care is a key part of your treatment and safety. Be sure to make and go to all appointments, and call your doctor if you are having problems. It's also a good idea to know your test results and keep a list of the medicines you take. How can you care for yourself at home? · Put ice or a cold pack on the area for 10 to 20 minutes at a time. Put a thin cloth between the ice and your skin. · Take anti-inflammatory medicines to reduce pain, swelling, or inflammation. These include ibuprofen (Advil, Motrin) and naproxen (Aleve). Read and follow all instructions on the label. · Avoid strenuous activities for several days, especially those that put stress on the area where you got the shot. · If you have dressings over the area, keep them clean and dry. You may remove them when your doctor tells you to. When should you call for help? Call your doctor now or seek immediate medical care if: 
· You have signs of infection, such as: 
¨ Increased pain, swelling, warmth, or redness. ¨ Red streaks leading from the site. ¨ Pus draining from the site. ¨ A fever. Watch closely for changes in your health, and be sure to contact your doctor if you have any problems. Where can you learn more? Go to http://lis-barry.info/. Enter N616 in the search box to learn more about \"Joint Injections: Care Instructions. \" Current as of: May 23, 2016 Content Version: 11.1 © 1686-8538 [employer]. Care instructions adapted under license by [employer] (which disclaims liability or warranty for this information). If you have questions about a medical condition or this instruction, always ask your healthcare professional. [address] any warranty or liability for your use of this information. Introducing [facility]! Dear Charlotte Stahl: 
Thank you for requesting a [employer] account. Our records indicate that you already have an active [employer] account. You can access your account anytime at [email or web address] Did you know that you can access your hospital and ER discharge instructions at any time in [employer]? You can also review all of your test results from your hospital stay or ER visit. Additional Information If you have questions, please visit the Frequently Asked Questions section of the [employer] website at [email or web address] Remember, [employer]ter[employer] is NOT to be used for urgent needs. For medical emergencies, dial 911. Now available from your iPhone and Android! Please provide this summary of care documentation to your next provider. Your primary care clinician is listed as Shanita Lenz. If you have any questions after today's visit, please call [phone].

## 2017-04-04 NOTE — PROGRESS NOTES
General Surgery Consult    Subjective:      HPI: The patient is a very pleasant 66-year-old female with a past medical history that is remarkable for obstructive sleep apnea on CPAP, obesity morbid obesity with BMI 47.12 kg/m², cardiomegaly benign hypertensive heart disease without heart failure, angina decubitus, edema, right breast cancer triple negative initially stage I however she was upstaged to stage II a year later when she had 26-27 lymph nodes positive for metastases. She has completed her chemotherapy with Dr. Javid Pickett. She also completed radiation therapy. She recently underwent bilateral mammography and bilateral MRI which I reviewed independently on the monitor. I agree with the BI-RADS 2 assessment. The patient denies any unintentional weight loss or breast pain or nipple drainage or new masses or skin changes in the breast since her last visit with Dr. Razia Corral in September 2016.     Patient Active Problem List    Diagnosis Date Noted    Malignant neoplasm of female breast ([address] 04/20/2016    Personal history of malignant neoplasm of breast 04/04/2016    [place] on CPAP 04/20/2015    Edema 04/20/2015    Angina decubitus ([address] 09/22/2014    Benign hypertensive heart disease without heart failure 09/22/2014    Cardiomegaly 09/22/2014    Abnormal nuclear cardiac imaging test 09/22/2014    Obesity 09/22/2014    Retinal detachment 06/30/2014     Past Medical History:   Diagnosis Date    Abnormal nuclear cardiac imaging test 9/22/2014    exertional chest tightness foe a few weeks progressively worse associated with sob     Arthritis     Asthma     Benign hypertensive heart disease without heart failure 9/22/2014    exertional chest tightness foe a few weeks progressively worse associated with sob     Cancer ([address] 12/5/14    Cancer ([address] 4-4-16    lymph nodes    Cardiomegaly 9/22/2014    exertional chest tightness foe a few weeks progressively worse associated with sob     Congestive heart failure, unspecified     Depression 01/1989    GERD (gastroesophageal reflux disease)     GERD (gastroesophageal reflux disease)     Heart failure (HCC)     Hypertension     Intermediate coronary syndrome ([address] 9/22/2014    increasing chest tightness and sob     Irregular heart beat     Palpitations    Obesity, unspecified 9/22/2014    exertional chest tightness foe a few weeks progressively worse associated with sob     Other and unspecified angina pectoris 9/22/2014    exertional chest tightness foe a few weeks progressively worse associated with sob     Other and unspecified angina pectoris 9/22/2014    exertional chest tightness foe a few weeks progressively worse associated with sob     Sleep apnea     cpap    Unspecified sleep apnea     cpap      Past Surgical History:   Procedure Laterality Date    BIOPSY OF BREAST, INCISIONAL      LT BENIGN, 15 YRS.  AGO    CARDIAC SURG PROCEDURE UNLIST      HX BREAST BIOPSY Left     HX BREAST BIOPSY Right 12/5/2014    RIGHT BREAST BIOPSY WITH NEEDLE LOCALIZATION ULTRASOUND TIMES TWO performed by Dorian Mota MD at [facility] MAIN OR    HX BREAST BIOPSY Right 2/11/2015    RIGHT BREAST EXPLORATION performed by Dorian Mota MD at [facility] MAIN OR    HX CATARACT REMOVAL      HX COLONOSCOPY      HX GYN      Laparoscopy    HX MASTECTOMY Right 2/4/2015    RIGHT PARTIAL MASTECTOMY/SENTINEL LYMPH NODE BIOPSY/POSSIBLE AXILLARY LYMPH NODE DISECTION performed by Dorian Mota MD at [facility] MAIN OR    HX MASTECTOMY Right 3/25/2016    RIGHT AXILLARY LYMPH NODE DISECTION/RIGHT PARTIAL MASTECTOMY performed by Dorian Mota MD at [facility] MA[facility] OR    HX ORTHOPAEDIC Right     bunionectomy    MD INSJ TUNNELED CTR VAD W/SUBQ PORT AGE 5 YR/>  5-4-16    Dr. Hailee Hair      Family History   Problem Relation Age of Onset   [facility] Arthritis-osteo Mother     Hypertension Mother     Arthritis-osteo Maternal Grandmother     Heart Disease Maternal Grandmother     Hypertension Maternal Grandmother     Arthritis-osteo Paternal Grandmother     Cancer Paternal Grandmother     Heart Disease Paternal Grandmother     Hypertension Paternal Grandmother     Arthritis-osteo Father     Cancer Father     Heart Disease Father     Hypertension Father     Lung Disease Father     Stroke Father     Cancer Paternal Grandfather     Lung Disease Paternal Grandfather     Stroke Paternal Grandfather     Hypertension Maternal Grandfather     Stroke Maternal Grandfather       Social History   Substance Use Topics    Smoking status: Former Smoker     Quit date: 9/22/1994    Smokeless tobacco: Never Used    Alcohol use Yes      Comment: occasionally      Allergies   Allergen Reactions    Pcn [Penicillins] Rash       Prior to Admission medications    Medication Sig Start Date End Date Taking? Authorizing Provider   MULTIVIT-MINERALS/FERROUS FUM (MULTI VITAMIN PO) Take  by mouth. Yes Historical Provider   cholecalciferol, vitamin D3, (VITAMIN D3) 2,000 unit tab Take  by mouth. Yes Historical Provider   isosorbide mononitrate ER (IMDUR) 30 mg tablet TAKE 1 TABLET BY MOUTH EVERY MORNING. 2/14/17  Yes Kia Chirinos NP   metoprolol tartrate (LOPRESSOR) 50 mg tablet Take 1 Tab by mouth two (2) times a day. 2/13/17  Yes Kia Chirinos NP   citalopram (CELEXA) 20 mg tablet Take  by mouth daily. Yes Historical Provider   spironolactone (ALDACTONE) 50 mg tablet Take 50 mg by mouth two (2) times a day. Yes Historical Provider   furosemide (LASIX) 40 mg tablet Take 40 mg by mouth two (2) times a day. Yes Brii Fiore MD   acetaminophen (TYLENOL) 325 mg tablet Take  by mouth every four (4) hours as needed for Pain. Historical Provider   oxyCODONE-acetaminophen (PERCOCET) 5-325 mg per tablet 1 or 2 tablets by mouth every 4 hours as needed 5/4/16   Shayy Mcclelland MD       Review of Systems:    14 systems were reviewed. The results are as above in the HPI and otherwise negative.      Objective: Vitals:    04/04/17 0919   BP: 110/70   Pulse: 84   Resp: 20   Temp: 98.1 °F (36.7 °C)   TempSrc: Oral   Weight: 120.7 kg (266 lb)   Height: 5' 3\" (1.6 m)       Physical Exam:  GENERAL: alert, cooperative, no distress, appears stated age,   EYE: conjunctivae/corneas clear. PERRL, EOM's intact. THROAT & NECK: normal and no erythema or exudates noted. ,    LYMPHATIC: Cervical, supraclavicular, and axillary nodes normal. ,   LUNG: clear to auscultation bilaterally,   HEART: regular rate and rhythm, S1, S2 normal, no murmur, click, rub or gallop,   BREASTS:   Left: No dimpling, discoloration, nipple inversion or retractions. No no axillary or supraclavicular lymphadenopathy. No mass  Right: No dimpling, discoloration, nipple inversion or retractions. No axillary or supraclavicular lymphadenopathy. Tender palpable mass deep to the skin approximately 1 cm x 1 cm in the 5 o'clock position of the breast approximately 10 cm from the nipple. ABDOMEN: soft, non-tender. Bowel sounds normal. No masses,  no organomegaly,   EXTREMITIES:  extremities normal, atraumatic, no cyanosis or edema,   SKIN: Normal.,   NEUROLOGIC: AOx3. Cranial nerves 2-12 and sensation grossly intact. ,     Data Review:  to be done    Impression:     · Patient with a tender mass in the lower inner quadrant of the right breast concerning for possible malignancy.     Plan:     · Ultrasound of the right breast upper inner quadrant

## 2017-04-21 NOTE — PATIENT INSTRUCTIONS
Body Mass Index: Care Instructions  Your Care Instructions    Body mass index (BMI) can help you see if your weight is raising your risk for health problems. It uses a formula to compare how much you weigh with how tall you are. · A BMI lower than 18.5 is considered underweight. · A BMI between 18.5 and 24.9 is considered healthy. · A BMI between 25 and 29.9 is considered overweight. A BMI of 30 or higher is considered obese. If your BMI is in the normal range, it means that you have a lower risk for weight-related health problems. If your BMI is in the overweight or obese range, you may be at increased risk for weight-related health problems, such as high blood pressure, heart disease, stroke, arthritis or joint pain, and diabetes. If your BMI is in the underweight range, you may be at increased risk for health problems such as fatigue, lower protection (immunity) against illness, muscle loss, bone loss, hair loss, and hormone problems. BMI is just one measure of your risk for weight-related health problems. You may be at higher risk for health problems if you are not active, you eat an unhealthy diet, or you drink too much alcohol or use tobacco products. Follow-up care is a key part of your treatment and safety. Be sure to make and go to all appointments, and call your doctor if you are having problems. It's also a good idea to know your test results and keep a list of the medicines you take. How can you care for yourself at home? · Practice healthy eating habits. This includes eating plenty of fruits, vegetables, whole grains, lean protein, and low-fat dairy. · If your doctor recommends it, get more exercise. Walking is a good choice. Bit by bit, increase the amount you walk every day. Try for at least 30 minutes on most days of the week. · Do not smoke. Smoking can increase your risk for health problems. If you need help quitting, talk to your doctor about stop-smoking programs and medicines. These can increase your chances of quitting for good. · Limit alcohol to 2 drinks a day for men and 1 drink a day for women. Too much alcohol can cause health problems. If you have a BMI higher than 25  · Your doctor may do other tests to check your risk for weight-related health problems. This may include measuring the distance around your waist. A waist measurement of more than 40 inches in men or 35 inches in women can increase the risk of weight-related health problems. · Talk with your doctor about steps you can take to stay healthy or improve your health. You may need to make lifestyle changes to lose weight and stay healthy, such as changing your diet and getting regular exercise. If you have a BMI lower than 18.5  · Your doctor may do other tests to check your risk for health problems. · Talk with your doctor about steps you can take to stay healthy or improve your health. You may need to make lifestyle changes to gain or maintain weight and stay healthy, such as getting more healthy foods in your diet and doing exercises to build muscle. Where can you learn more? Go to http://lis-barry.info/. Enter S176 in the search box to learn more about \"Body Mass Index: Care Instructions. \"  Current as of: January 23, 2017  Content Version: 11.2  © 8617-1321 [employer]. Care instructions adapted under license by [employer] (which disclaims liability or warranty for this information). If you have questions about a medical condition or this instruction, always ask your healthcare professional. [place] [address] any warranty or liability for your use of this information. Learning About CPAP for Sleep Apnea  What is CPAP? CPAP is a small machine that you use at home every night while you sleep. It increases air pressure in your throat to keep your airway open. When you have sleep apnea, this can help you sleep better so you feel much better. CPAP stands for \"continuous positive airway pressure. \"  The CPAP machine will have one of the following:  · A mask that covers your nose and mouth  · Prongs that fit into your nose  · A mask that covers your nose only, the most common type. This type is called NCPAP. The N stands for \"nasal.\"  Why is it done? CPAP is usually the best treatment for obstructive sleep apnea. It is the first treatment choice and the most widely used. Your doctor may suggest CPAP if you have:  · Moderate to severe sleep apnea. · Sleep apnea and coronary artery disease (CAD) or heart failure. How does it help? · CPAP can help you have more normal sleep, so you feel less sleepy and more alert during the daytime. · CPAP may help keep heart failure or other heart problems from getting worse. · CPAP may help lower your blood pressure. · If you use CPAP, your bed partner may also sleep better because you are not snoring or restless. What are the side effects? Some people who use CPAP have:  · A dry or stuffy nose and a sore throat. · Irritated skin on the face. · Sore eyes. · Bloating. If you have any of these problems, work with your doctor to fix them. Here are some things you can try:  · Be sure the mask or nasal prongs fit well. · See if your doctor can adjust the pressure of your CPAP. · If your nose is dry, try a humidifier. · If your nose is runny or stuffy, try decongestant medicine or a steroid nasal spray. Be safe with medicines. Read and follow all instructions on the label. Do not use the medicine longer than the label says. If these things do not help, you might try a different type of machine. Some machines have air pressure that adjusts on its own. Others have air pressures that are different when you breathe in than when you breathe out. This may reduce discomfort caused by too much pressure in your nose. Where can you learn more? Go to http://lis-barry.info/.   Enter W206 in the search box to learn more about \"Learning About CPAP for Sleep Apnea. \"  Current as of: May 23, 2016  Content Version: 11.2  © 5079-9270 [employer]. Care instructions adapted under license by [employer] (which disclaims liability or warranty for this information). If you have questions about a medical condition or this instruction, always ask your healthcare professional. [address] any warranty or liability for your use of this information. Recovering From Depression: Care Instructions  Your Care Instructions  Taking good care of yourself is important as you recover from depression. In time, your symptoms will fade as your treatment takes hold. Do not give up. Instead, focus your energy on getting better. Your mood will improve. It just takes some time. Focus on things that can help you feel better, such as being with friends and family, eating well, and getting enough rest. But take things slowly. Do not do too much too soon. You will begin to feel better gradually. Follow-up care is a key part of your treatment and safety. Be sure to make and go to all appointments, and call your doctor if you are having problems. It's also a good idea to know your test results and keep a list of the medicines you take. How can you care for yourself at home? Be realistic  · If you have a large task to do, break it up into smaller steps you can handle, and just do what you can. · You may want to put off important decisions until your depression has lifted. If you have plans that will have a major impact on your life, such as marriage, divorce, or a job change, try to wait a bit. Talk it over with friends and loved ones who can help you look at the overall picture first.  · Reaching out to people for help is important. Do not isolate yourself. Let your family and friends help you. Find someone you can trust and confide in, and talk to that person.   · Be patient, and be kind to yourself. Remember that depression is not your fault and is not something you can overcome with willpower alone. Treatment is necessary for depression, just like for any other illness. Feeling better takes time, and your mood will improve little by little. Stay active  · Stay busy and get outside. Take a walk, or try some other light exercise. · Talk with your doctor about an exercise program. Exercise can help with mild depression. · Go to a movie or concert. Take part in a [employer] activity or other social gathering. Go to a ball game. · Ask a friend to have dinner with you. Take care of yourself  · Eat a balanced diet with plenty of fresh fruits and vegetables, whole grains, and lean protein. If you have lost your appetite, eat small snacks rather than large meals. · Avoid drinking alcohol or using illegal drugs. Do not take medicines that have not been prescribed for you. They may interfere with medicines you may be taking for depression, or they may make your depression worse. · Take your medicines exactly as they are prescribed. You may start to feel better within 1 to 3 weeks of taking antidepressant medicine. But it can take as many as 6 to 8 weeks to see more improvement. If you have questions or concerns about your medicines, or if you do not notice any improvement by 3 weeks, talk to your doctor. · If you have any side effects from your medicine, tell your doctor. Antidepressants can make you feel tired, dizzy, or nervous. Some people have dry mouth, constipation, headaches, sexual problems, or diarrhea. Many of these side effects are mild and will go away on their own after you have been taking the medicine for a few weeks. Some may last longer. Talk to your doctor if side effects are bothering you too much. You might be able to try a different medicine. · Get enough sleep. If you have problems sleeping:  ¨ Go to bed at the same time every night, and get up at the same time every morning.   ¨ Keep your bedroom dark and quiet. ¨ Do not exercise after 5:00 p.m. ¨ Avoid drinks with caffeine after 5:00 p.m. · Avoid sleeping pills unless they are prescribed by the doctor treating your depression. Sleeping pills may make you groggy during the day, and they may interact with other medicine you are taking. · If you have any other illnesses, such as diabetes, heart disease, or high blood pressure, make sure to continue with your treatment. Tell your doctor about all of the medicines you take, including those with or without a prescription. · Keep the numbers for these national suicide hotlines: 5-642-957-TALK (7-266.851.5294) and 6-613-ZSNWFSV (5-120.815.8630). If you or someone you know talks about suicide or feeling hopeless, get help right away. When should you call for help? Call 911 anytime you think you may need emergency care. For example, call if:  · You feel like hurting yourself or someone else. · Someone you know has depression and is about to attempt or is attempting suicide. Call your doctor now or seek immediate medical care if:  · You hear voices. · Someone you know has depression and:  ¨ Starts to give away his or her possessions. ¨ Uses illegal drugs or drinks alcohol heavily. ¨ Talks or writes about death, including writing suicide notes or talking about guns, knives, or pills. ¨ Starts to spend a lot of time alone. ¨ Acts very aggressively or suddenly appears calm. Watch closely for changes in your health, and be sure to contact your doctor if:  · You do not get better as expected. Where can you learn more? Go to http://lis-barry.info/. Enter G114 in the search box to learn more about \"Recovering From Depression: Care Instructions. \"  Current as of: July 26, 2016  Content Version: 11.2  © 0780-7641 [employer]. Care instructions adapted under license by [employer] (which disclaims liability or warranty for this information).  If you have questions about a medical condition or this instruction, always ask your healthcare professional. [place] [address] any warranty or liability for your use of this information.

## 2017-04-21 NOTE — MR AVS SNAPSHOT
Visit Information Date & Time Provider Department Dept. Phone Encounter #  
 4/21/2017  9:30 AM Gavino Espinosa MD [facility] [phone] [id number] Follow-up Instructions Return in about 3 months (around 7/21/2017), or if symptoms worsen or fail to improve, for HTN, Cardiomegaly, Pneumovax. Your Appointments 6/22/2017  8:00 AM  
Follow Up with Renny Moran MD  
[facility]) Appt Note: 3 M FU RT KNEE  
 [address] [facility], [address] [facility] [id number] [phone]  
  
   
 [address] [facility], [address] [place] [id number] 9/13/2017  9:15 AM  
ESTABLISHED PATIENT with Rodolfo Davison MD  
[facility]) Appt Note: 6 months [address] [facility], [address] [facility] [address]  
[address] [facility], [address] [place] [address] Upcoming Health Maintenance Date Due Hepatitis C Screening 1955 Pneumococcal 19-64 Highest Risk (1 of 3 - PCV13) 12/24/1972 DTaP/Tdap/Td series (1 - Tdap) 12/24/1974 FOBT Q 1 YEAR AGE 50-75 12/24/2003 ZOSTER VACCINE AGE 60> 12/24/2013 PAP AKA CERVICAL CYTOLOGY 10/24/2014 BREAST CANCER SCRN MAMMOGRAM 3/14/2019 Allergies as of 4/21/2017  Review Complete On: 4/21/2017 By: Gavino Espinosa MD  
  
 Severity Noted Reaction Type Reactions Pcn [Penicillins]  01/16/2013    Rash Current Immunizations  Never Reviewed No immunizations on file. Not reviewed this visit You Were Diagnosed With   
  
 Codes Comments Benign hypertensive heart disease without heart failure    -  Primary ICD-10-CM: I11.9 ICD-9-CM: 402.10 Morbid obesity due to excess calories (HCC)     ICD-10-CM: E66.01 
ICD-9-CM: 278.01   
 VIRGINIE on CPAP     ICD-10-CM: G47.33, Z99.89 ICD-9-CM: 327.23, V46.8 Depression, unspecified depression type     ICD-10-CM: F32.9 ICD-9-CM: 995 Need for hepatitis C screening test     ICD-10-CM: Z11.59 
ICD-9-CM: V73.89 Vitals BP Pulse Temp Resp Height(growth percentile) Weight(growth percentile) 111/63 74 97.6 °F (36.4 °C) 16 5' 2.5\" (1.588 m) 274 lb (124.3 kg) SpO2 BMI OB Status Smoking Status 94% 49.32 kg/m2 Postmenopausal Former Smoker Vitals History BMI and BSA Data Body Mass Index Body Surface Area  
 49.32 kg/m 2 2.34 m 2 Preferred Pharmacy Pharmacy Name Phone [id number] - [place], [address] [place] [address] Jocelyn. [phone] Your Updated Medication List  
  
   
This list is accurate as of: 4/21/17 10:17 AM.  Always use your most recent med list.  
  
  
  
  
 citalopram 20 mg tablet Commonly known as:  Richie Martyr Take 1 Tab by mouth daily. furosemide 40 mg tablet Commonly known as:  LASIX Take 1 Tab by mouth two (2) times a day. GARCINIA CAMBOGIA PO Take  by mouth. Indications: 2 tablets daily HAIR,SKIN AND NAILS PO Take  by mouth.  
  
 isosorbide mononitrate ER 30 mg tablet Commonly known as:  IMDUR  
TAKE 1 TABLET BY MOUTH EVERY MORNING.  
  
 metoprolol tartrate 50 mg tablet Commonly known as:  LOPRESSOR Take 1 Tab by mouth two (2) times a day. MULTI VITAMIN PO Take  by mouth. spironolactone 50 mg tablet Commonly known as:  ALDACTONE Take 1 Tab by mouth two (2) times a day. VITAMIN D3 2,000 unit Tab Generic drug:  cholecalciferol (vitamin D3) Take  by mouth. Prescriptions Sent to Pharmacy Refills  
 furosemide (LASIX) 40 mg tablet 3 Sig: Take 1 Tab by mouth two (2) times a day. Class: Normal  
 Pharm[facility]: [place] [address] [facility], [address] Ph #: [phone] Route: Oral  
 citalopram (CELEXA) 20 mg tablet 3 Sig: Take 1 Tab by mouth daily. Class: Normal  
 Pharm[facility] [place] [address] [facility], [address]  #: [phone] Route: Oral  
 spironolactone (ALDACTONE) 50 mg tablet 3 Sig: Take 1 Tab by mouth two (2) times a day. Class: Normal  
 Pharmacy: [place] [address], [address]  #: [phone] Route: Oral  
  
Follow-up Instructions Return in about 3 months (around 7/21/2017), or if symptoms worsen or fail to improve, for HTN, Cardiomegaly, Pneumovax. To-Do List   
 04/21/2017 Lab:  HEMOGLOBIN A1C WITH EAG   
  
 04/21/2017 Lab:  HEPATITIS C AB   
  
 04/21/2017 Lab:  LIPID PANEL Patient Instructions Body Mass Index: Care Instructions Your Care Instructions Body mass index (BMI) can help you see if your weight is raising your risk for health problems. It uses a formula to compare how much you weigh with how tall you are. · A BMI lower than 18.5 is considered underweight. · A BMI between 18.5 and 24.9 is considered healthy. · A BMI between 25 and 29.9 is considered overweight. A BMI of 30 or higher is considered obese. If your BMI is in the normal range, it means that you have a lower risk for weight-related health problems. If your BMI is in the overweight or obese range, you may be at increased risk for weight-related health problems, such as high blood pressure, heart disease, stroke, arthritis or joint pain, and diabetes. If your BMI is in the underweight range, you may be at increased risk for health problems such as fatigue, lower protection (immunity) against illness, muscle loss, bone loss, hair loss, and hormone problems. BMI is just one measure of your risk for weight-related health problems. You may be at higher risk for health problems if you are not active, you eat an unhealthy diet, or you drink too much alcohol or use tobacco products. Follow-up care is a key part of your treatment and safety.  Be sure to make and go to all appointments, and call your doctor if you are having problems. It's also a good idea to know your test results and keep a list of the medicines you take. How can you care for yourself at home? · Practice healthy eating habits. This includes eating plenty of fruits, vegetables, whole grains, lean protein, and low-fat dairy. · If your doctor recommends it, get more exercise. Walking is a good choice. Bit by bit, increase the amount you walk every day. Try for at least 30 minutes on most days of the week. · Do not smoke. Smoking can increase your risk for health problems. If you need help quitting, talk to your doctor about stop-smoking programs and medicines. These can increase your chances of quitting for good. · Limit alcohol to 2 drinks a day for men and 1 drink a day for women. Too much alcohol can cause health problems. If you have a BMI higher than 25 · Your doctor may do other tests to check your risk for weight-related health problems. This may include measuring the distance around your waist. A waist measurement of more than 40 inches in men or 35 inches in women can increase the risk of weight-related health problems. · Talk with your doctor about steps you can take to stay healthy or improve your health. You may need to make lifestyle changes to lose weight and stay healthy, such as changing your diet and getting regular exercise. If you have a BMI lower than 18.5 · Your doctor may do other tests to check your risk for health problems. · Talk with your doctor about steps you can take to stay healthy or improve your health. You may need to make lifestyle changes to gain or maintain weight and stay healthy, such as getting more healthy foods in your diet and doing exercises to build muscle. Where can you learn more? Go to http://lis-barry.info/. Enter S176 in the search box to learn more about \"Body Mass Index: Care Instructions. \" Current as of: January 23, 2017 Content Version: 11.2 © 0235-1504 Healthwise, [employer]. Care instructions adapted under license by [employer] (which disclaims liability or warranty for this information). If you have questions about a medical condition or this instruction, always ask your healthcare professional. [address] any warranty or liability for your use of this information. Learning About CPAP for Sleep Apnea What is CPAP? CPAP is a small machine that you use at home every night while you sleep. It increases air pressure in your throat to keep your airway open. When you have sleep apnea, this can help you sleep better so you feel much better. CPAP stands for \"continuous positive airway pressure. \" The CPAP machine will have one of the following: · A mask that covers your nose and mouth · Prongs that fit into your nose · A mask that covers your nose only, the most common type. This type is called NCPAP. The N stands for \"nasal.\" Why is it done? CPAP is usually the best treatment for obstructive sleep apnea. It is the first treatment choice and the most widely used. Your doctor may suggest CPAP if you have: · Moderate to severe sleep apnea. · Sleep apnea and coronary artery disease (CAD) or heart failure. How does it help? · CPAP can help you have more normal sleep, so you feel less sleepy and more alert during the daytime. · CPAP may help keep heart failure or other heart problems from getting worse. · CPAP may help lower your blood pressure. · If you use CPAP, your bed partner may also sleep better because you are not snoring or restless. What are the side effects? Some people who use CPAP have: · A dry or stuffy nose and a sore throat. · Irritated skin on the face. · Sore eyes. · Bloating. If you have any of these problems, work with your doctor to fix them. Here are some things you can try: · Be sure the mask or nasal prongs fit well. · See if your doctor can adjust the pressure of your CPAP. · If your nose is dry, try a humidifier. · If your nose is runny or stuffy, try decongestant medicine or a steroid nasal spray. Be safe with medicines. Read and follow all instructions on the label. Do not use the medicine longer than the label says. If these things do not help, you might try a different type of machine. Some machines have air pressure that adjusts on its own. Others have air pressures that are different when you breathe in than when you breathe out. This may reduce discomfort caused by too much pressure in your nose. Where can you learn more? Go to http://lis-barry.info/. Enter J309 in the search box to learn more about \"Learning About CPAP for Sleep Apnea. \" Current as of: May 23, 2016 Content Version: 11.2 © 3690-1227 [employer]. Care instructions adapted under license by [employer] (which disclaims liability or warranty for this information). If you have questions about a medical condition or this instruction, always ask your healthcare professional. [address] any warranty or liability for your use of this information. Recovering From Depression: Care Instructions Your Care Instructions Taking good care of yourself is important as you recover from depression. In time, your symptoms will fade as your treatment takes hold. Do not give up. Instead, focus your energy on getting better. Your mood will improve. It just takes some time. Focus on things that can help you feel better, such as being with friends and family, eating well, and getting enough rest. But take things slowly. Do not do too much too soon. You will begin to feel better gradually. Follow-up care is a key part of your treatment and safety.  Be sure to make and go to all appointments, and call your doctor if you are having problems. It's also a good idea to know your test results and keep a list of the medicines you take. How can you care for yourself at home? Be realistic · If you have a large task to do, break it up into smaller steps you can handle, and just do what you can. · You may want to put off important decisions until your depression has lifted. If you have plans that will have a major impact on your life, such as marriage, divorce, or a job change, try to wait a bit. Talk it over with friends and loved ones who can help you look at the overall picture first. 
· Reaching out to people for help is important. Do not isolate yourself. Let your family and friends help you. Find someone you can trust and confide in, and talk to that person. · Be patient, and be kind to yourself. Remember that depression is not your fault and is not something you can overcome with willpower alone. Treatment is necessary for depression, just like for any other illness. Feeling better takes time, and your mood will improve little by little. Stay active · Stay busy and get outside. Take a walk, or try some other light exercise. · Talk with your doctor about an exercise program. Exercise can help with mild depression. · Go to a movie or concert. Take part in a Restorationism activity or other social gathering. Go to a ball game. · Ask a friend to have dinner with you. Take care of yourself · Eat a balanced diet with plenty of fresh fruits and vegetables, whole grains, and lean protein. If you have lost your appetite, eat small snacks rather than large meals. · Avoid drinking alcohol or using illegal drugs. Do not take medicines that have not been prescribed for you. They may interfere with medicines you may be taking for depression, or they may make your depression worse. · Take your medicines exactly as they are prescribed. You may start to feel better within 1 to 3 weeks of taking antidepressant medicine.  But it can take as many as 6 to 8 weeks to see more improvement. If you have questions or concerns about your medicines, or if you do not notice any improvement by 3 weeks, talk to your doctor. · If you have any side effects from your medicine, tell your doctor. Antidepressants can make you feel tired, dizzy, or nervous. Some people have dry mouth, constipation, headaches, sexual problems, or diarrhea. Many of these side effects are mild and will go away on their own after you have been taking the medicine for a few weeks. Some may last longer. Talk to your doctor if side effects are bothering you too much. You might be able to try a different medicine. · Get enough sleep. If you have problems sleeping: ¨ Go to bed at the same time every night, and get up at the same time every morning. ¨ Keep your bedroom dark and quiet. ¨ Do not exercise after 5:00 p.m. ¨ Avoid drinks with caffeine after 5:00 p.m. · Avoid sleeping pills unless they are prescribed by the doctor treating your depression. Sleeping pills may make you groggy during the day, and they may interact with other medicine you are taking. · If you have any other illnesses, such as diabetes, heart disease, or high blood pressure, make sure to continue with your treatment. Tell your doctor about all of the medicines you take, including those with or without a prescription. · Keep the numbers for these national suicide hotlines: 1-509-763-TALK (5-947-392-408.777.4932) and 7-031-GWNVKVP (7-498.883.7096). If you or someone you know talks about suicide or feeling hopeless, get help right away. When should you call for help? Call 911 anytime you think you may need emergency care. For example, call if: 
· You feel like hurting yourself or someone else. · Someone you know has depression and is about to attempt or is attempting suicide. Call your doctor now or seek immediate medical care if: 
· You hear voices. · Someone you know has depression and: ¨ Starts to give away his or her possessions. ¨ Uses illegal drugs or drinks alcohol heavily. ¨ Talks or writes about death, including writing suicide notes or talking about guns, knives, or pills. ¨ Starts to spend a lot of time alone. ¨ Acts very aggressively or suddenly appears calm. Watch closely for changes in your health, and be sure to contact your doctor if: 
· You do not get better as expected. Where can you learn more? Go to http://lis-barry.info/. Enter Q782 in the search box to learn more about \"Recovering From Depression: Care Instructions. \" Current as of: July 26, 2016 Content Version: 11.2 © 2202-8789 [employer]. Care instructions adapted under license by [employer] (which disclaims liability or warranty for this information). If you have questions about a medical condition or this instruction, always ask your healthcare professional. [place] [address] any warranty or liability for your use of this information. Introducing [facility]! Dear Joe Mendoza: 
Thank you for requesting a [employer] account. Our records indicate that you already have an active [employer] account. You can access your account anytime at [email or web address] Did you know that you can access your hospital and ER discharge instructions at any time in [employer]? You can also review all of your test results from your hospital stay or ER visit. Additional Information If you have questions, please visit the Frequently Asked Questions section of the [employer] website at [email or web address] Remember, [employer] is NOT to be used for urgent needs. For medical emergencies, dial 911. Now available from your iPhone and Android! Please provide this summary of care documentation to your next provider. Your primary care clinician is listed as Donavon Llamas.  If you have any questions after today's visit, please call [phone].

## 2017-04-21 NOTE — PROGRESS NOTES
Chief Complaint   Patient presents with    Establish Care    Hypertension     HPI:  New patient who was previous patient of Dr Richard Barber  HTN, Cardiomyopathy follows Dr Karla Gavin and has been doing well, stable and complaint with medications. Denies HA, blurry vision, chest pain, sob, focal deficits. Obesity  VIRGINIE on CPAP followed by Dr Bright Bose sees patient for Pap smear  Dr Cher Villasenor did colonoscopy 2 years ago. Due back in 1 year  Hx of breast cancer s/p mastectomy following Dr Jeff Hopkins (gen surg) and [place] (Oncology). Chemo therapy complete and will have port removed soon  Depression: stable, Denies SI and HI  Patient has no complaints today but needs refills        Review of Systems   Constitutional: Negative for chills, fever and weight loss. HENT: Negative for congestion and sore throat. Eyes: Negative for blurred vision, double vision and photophobia. Respiratory: Negative for cough, shortness of breath and wheezing. Cardiovascular: Negative for chest pain, palpitations and orthopnea. Gastrointestinal: Negative for abdominal pain, blood in stool, constipation, diarrhea, heartburn, nausea and vomiting. Genitourinary: Negative for dysuria, frequency and urgency. Skin: Negative for itching and rash. Neurological: Negative for dizziness and headaches. Psychiatric/Behavioral: Positive for depression. Negative for suicidal ideas. The patient does not have insomnia.           Allergies   Allergen Reactions    Pcn [Penicillins] Rash     Past Medical History:   Diagnosis Date    Abnormal nuclear cardiac imaging test 9/22/2014    exertional chest tightness foe a few weeks progressively worse associated with sob     Arthritis     Asthma     Benign hypertensive heart disease without heart failure 9/22/2014    exertional chest tightness foe a few weeks progressively worse associated with sob     Breast cancer ([address] 2014    right    Cancer ([address] 12/5/14    Cancer ([address] 4-4-16    lymph nodes    Cardiomegaly 9/22/2014    exertional chest tightness foe a few weeks progressively worse associated with sob     Congestive heart failure, unspecified     Depression 01/1989    GERD (gastroesophageal reflux disease)     GERD (gastroesophageal reflux disease)     Heart failure (HCC)     Hypertension     Intermediate coronary syndrome ([facility] 9/22/2014    increasing chest tightness and sob     Irregular heart beat     Palpitations    Obesity, unspecified 9/22/2014    exertional chest tightness foe a few weeks progressively worse associated with sob     Other and unspecified angina pectoris 9/22/2014    exertional chest tightness foe a few weeks progressively worse associated with sob     Other and unspecified angina pectoris 9/22/2014    exertional chest tightness foe a few weeks progressively worse associated with sob     Sleep apnea     cpap    Unspecified sleep apnea     cpap     Past Surgical History:   Procedure Laterality Date    BIOPSY OF BREAST, INCISIONAL      LT BENIGN, 15 YRS.  AGO    CARDIAC SURG PROCEDURE UNLIST      HX BREAST BIOPSY Left     HX BREAST BIOPSY Right 12/5/2014    RIGHT BREAST BIOPSY WITH NEEDLE LOCALIZATION ULTRASOUND TIMES TWO performed by Taco Pereira MD at [address] [place] [address] HX BREAST BIOPSY Right 2/11/2015    RIGHT BREAST EXPLORATION performed by Taco Pereira MD at [address] [place] [address] HX BUNIONECTOMY Right     HX CATARACT REMOVAL      HX CATARACT REMOVAL      HX COLONOSCOPY      HX GYN      Laparoscopy    HX MASTECTOMY Right 2/4/2015    RIGHT PARTIAL MASTECTOMY/SENTINEL LYMPH NODE BIOPSY/POSSIBLE AXILLARY LYMPH NODE DISECTION performed by Taco Pereira MD at [address] [place] [address] HX MASTECTOMY Right 3/25/2016    RIGHT AXILLARY LYMPH NODE DISECTION/RIGHT PARTIAL MASTECTOMY performed by Taco Pereira MD at [address] [place] [address] HX ORTHOPAEDIC Right     bunionectomy    HX VASCULAR ACCESS  2016    mediport    [place] INSJ TUNNELED CTR VAD W/SUBQ PORT AGE 5 YR/>  5-4-16    Dr. Lia Palomo Family History   Problem Relation Age of Onset    Arthritis-osteo Mother     Hypertension Mother     Arthritis-osteo Maternal Grandmother     Heart Disease Maternal Grandmother     Hypertension Maternal Grandmother     Arthritis-osteo Paternal Grandmother     Cancer Paternal Grandmother     Heart Disease Paternal Grandmother     Hypertension Paternal Grandmother     Arthritis-osteo Father     Cancer Father     Heart Disease Father     Hypertension Father     Lung Disease Father     Stroke Father     Cancer Paternal Grandfather     Lung Disease Paternal Grandfather     Stroke Paternal Grandfather     Hypertension Maternal Grandfather     Stroke Maternal Grandfather      History   Smoking Status    Former Smoker    Quit date: 9/22/1994   Smokeless Tobacco    Never Used     Social History     Social History    Marital status: SINGLE     Spouse name: N/A    Number of children: N/A    Years of education: N/A     Occupational History    Not on file. Social History Main Topics    Smoking status: Former Smoker     Quit date: 9/22/1994    Smokeless tobacco: Never Used    Alcohol use Yes      Comment: occasionally    Drug use: No    Sexual activity: Not Currently     Partners: Male     Birth control/ protection: Surgical     Other Topics Concern    Not on file     Social History Narrative         OBJECTIVE:  Visit Vitals    /63    Pulse 74    Temp 97.6 °F (36.4 °C)    Resp 16    Ht 5' 2.5\" (1.588 m)    Wt 274 lb (124.3 kg)    SpO2 94%    BMI 49.32 kg/m2     PHYSICAL EXAM:  Physical Exam   Constitutional: She is oriented to person, place, and time. She appears well-developed and well-nourished. HENT:   Right Ear: External ear normal.   Left Ear: External ear normal.   Nose: Nose normal.   Mouth/Throat: Oropharynx is clear and moist.   Eyes: Conjunctivae are normal. Pupils are equal, round, and reactive to light.    Cardiovascular: Normal rate, regular rhythm and intact distal pulses. Pulmonary/Chest: Effort normal and breath sounds normal. No respiratory distress. She has no wheezes. She has no rales. She exhibits no tenderness. Abdominal: Soft. Bowel sounds are normal. She exhibits no distension. There is no tenderness. Lymphadenopathy:     She has no cervical adenopathy. Neurological: She is alert and oriented to person, place, and time. Skin: Skin is warm. Psychiatric: She has a normal mood and affect. Her behavior is normal.       ASSESSMENT/PLAN:  Blaze Ron was seen today for establish care and hypertension. Diagnoses and all orders for this visit:    Benign hypertensive heart disease without heart failure  -     furosemide (LASIX) 40 mg tablet; Take 1 Tab by mouth two (2) times a day. -     spironolactone (ALDACTONE) 50 mg tablet; Take 1 Tab by mouth two (2) times a day. Morbid obesity due to excess calories (HCC)  -     HEMOGLOBIN A1C WITH EAG; Future  -     LIPID PANEL; Future    VIRGINIE on CPAP    Depression, unspecified depression type  -     citalopram (CELEXA) 20 mg tablet; Take 1 Tab by mouth daily. Need for hepatitis C screening test  -     HEPATITIS C AB; Future     Has not had pneumovax due to being on chemo, plan to give next visit since chemo is complete. Medical records requested    I have discussed the diagnosis with the patient and the intended plan as seen in the above orders. The patient has received an after-visit summary and questions were answered concerning future plans. I have discussed medication side effects and warnings with the patient as well. I have reviewed the plan of care with the patient, accepted their input and they are in agreement with the treatment goals. Patient verbalizes understanding. Follow-up Disposition:  Return in about 3 months (around 7/21/2017), or if symptoms worsen or fail to improve, for HTN, Cardiomyopathy, Pneumovax.

## 2017-05-01 NOTE — H&P
General Surgery Consult    Subjective:      HPI: The patient is a very pleasant 78-year-old female with a past medical history that is remarkable for obstructive sleep apnea on CPAP, obesity morbid obesity with BMI 47.12 kg/m², cardiomegaly benign hypertensive heart disease without heart failure, angina decubitus, edema, right breast cancer triple negative initially stage I however she was upstaged to stage II a year later when she had 26-27 lymph nodes positive for metastases. She has completed her chemotherapy with Dr. Sangita Sandoval. She also completed radiation therapy. She is ready to have her mediport removed.      Patient Active Problem List    Diagnosis Date Noted    Malignant neoplasm of female breast ([address] 04/20/2016    Personal history of malignant neoplasm of breast 04/04/2016    VIRGINIE on CPAP 04/20/2015    Edema 04/20/2015    Angina decubitus ([address] 09/22/2014    Benign hypertensive heart disease without heart failure 09/22/2014    Cardiomegaly 09/22/2014    Abnormal nuclear cardiac imaging test 09/22/2014    Obesity 09/22/2014    Retinal detachment 06/30/2014     Past Medical History:   Diagnosis Date    Abnormal nuclear cardiac imaging test 9/22/2014    exertional chest tightness foe a few weeks progressively worse associated with sob     Arthritis     Asthma     Benign hypertensive heart disease without heart failure 9/22/2014    exertional chest tightness foe a few weeks progressively worse associated with sob     Cancer ([address] 12/5/14    Cancer ([address] 4-4-16    lymph nodes    Cardiomegaly 9/22/2014    exertional chest tightness foe a few weeks progressively worse associated with sob     Congestive heart failure, unspecified     Depression 01/1989    GERD (gastroesophageal reflux disease)     GERD (gastroesophageal reflux disease)     Heart failure (HCC)     Hypertension     Intermediate coronary syndrome ([address] 9/22/2014    increasing chest tightness and sob     Irregular heart beat     Palpitations    Obesity, unspecified 9/22/2014    exertional chest tightness foe a few weeks progressively worse associated with sob     Other and unspecified angina pectoris 9/22/2014    exertional chest tightness foe a few weeks progressively worse associated with sob     Other and unspecified angina pectoris 9/22/2014    exertional chest tightness foe a few weeks progressively worse associated with sob     Sleep apnea     cpap    Unspecified sleep apnea     cpap      Past Surgical History:   Procedure Laterality Date    BIOPSY OF BREAST, INCISIONAL      LT BENIGN, 15 YRS.  AGO    CARDIAC SURG PROCEDURE UNLIST      HX BREAST BIOPSY Left     HX BREAST BIOPSY Right 12/5/2014    RIGHT BREAST BIOPSY WITH NEEDLE LOCALIZATION ULTRASOUND TIMES TWO performed by True Mayo MD at [address] [place] [address] HX BREAST BIOPSY Right 2/11/2015    RIGHT BREAST EXPLORATION performed by True Mayo MD at [facility] MAIN OR    HX CATARACT REMOVAL      HX COLONOSCOPY      HX GYN      Laparoscopy    HX MASTECTOMY Right 2/4/2015    RIGHT PARTIAL MASTECTOMY/SENTINEL LYMPH NODE BIOPSY/POSSIBLE AXILLARY LYMPH NODE DISECTION performed by True Mayo MD at [address] [place] [address] HX MASTECTOMY Right 3/25/2016    RIGHT AXILLARY LYMPH NODE DISECTION/RIGHT PARTIAL MASTECTOMY performed by True Mayo MD at [facility] MAIN OR    HX ORTHOPAEDIC Right     bunionectomy    [place] INSJ TUNNELED CTR VAD W/SUBQ PORT AGE 5 YR/>  5-4-16    Dr. Susan Davison      Family History   Problem Relation Age of Onset    Arthritis-osteo Mother     Hypertension Mother     Arthritis-osteo Maternal Grandmother     Heart Disease Maternal Grandmother     Hypertension Maternal Grandmother     Arthritis-osteo Paternal Grandmother     Cancer Paternal Grandmother     Heart Disease Paternal Grandmother     Hypertension Paternal Grandmother     Arthritis-osteo Father     Cancer Father     Heart Disease Father     Hypertension Father     Lung Disease Father     Stroke Father     Cancer Paternal Grandfather     Lung Disease Paternal Grandfather     Stroke Paternal Grandfather     Hypertension Maternal Grandfather     Stroke Maternal Grandfather       Social History   Substance Use Topics    Smoking status: Former Smoker     Quit date: 9/22/1994    Smokeless tobacco: Never Used    Alcohol use Yes      Comment: occasionally      Allergies   Allergen Reactions    Pcn [Penicillins] Rash       Prior to Admission medications    Medication Sig Start Date End Date Taking? Authorizing Provider   MULTIVIT-MINERALS/FERROUS FUM (MULTI VITAMIN PO) Take  by mouth. Yes Historical Provider   cholecalciferol, vitamin D3, (VITAMIN D3) 2,000 unit tab Take  by mouth. Yes Historical Provider   isosorbide mononitrate ER (IMDUR) 30 mg tablet TAKE 1 TABLET BY MOUTH EVERY MORNING. 2/14/17  Yes Kia Chirinos NP   metoprolol tartrate (LOPRESSOR) 50 mg tablet Take 1 Tab by mouth two (2) times a day. 2/13/17  Yes Kia Chirinos NP   citalopram (CELEXA) 20 mg tablet Take  by mouth daily. Yes Historical Provider   spironolactone (ALDACTONE) 50 mg tablet Take 50 mg by mouth two (2) times a day. Yes Historical Provider   furosemide (LASIX) 40 mg tablet Take 40 mg by mouth two (2) times a day. Yes Brii Fiore MD   acetaminophen (TYLENOL) 325 mg tablet Take  by mouth every four (4) hours as needed for Pain. Historical Provider   oxyCODONE-acetaminophen (PERCOCET) 5-325 mg per tablet 1 or 2 tablets by mouth every 4 hours as needed 5/4/16   Laura Hernandez MD       Review of Systems:    14 systems were reviewed. The results are as above in the HPI and otherwise negative. Objective:     Vitals:    04/04/17 0919   BP: 110/70   Pulse: 84   Resp: 20   Temp: 98.1 °F (36.7 °C)   TempSrc: Oral   Weight: 120.7 kg (266 lb)   Height: 5' 3\" (1.6 m)       Physical Exam:  GENERAL: alert, cooperative, no distress, appears stated age,   EYE: conjunctivae/corneas clear.  PERRL, EOM's intact. THROAT & NECK: normal and no erythema or exudates noted. ,    LYMPHATIC: Cervical, supraclavicular, and axillary nodes normal. ,   LUNG: clear to auscultation bilaterally,   HEART: regular rate and rhythm, S1, S2 normal, no murmur, click, rub or gallop,   BREASTS:   Left: No dimpling, discoloration, nipple inversion or retractions. No no axillary or supraclavicular lymphadenopathy. No mass  Right: No dimpling, discoloration, nipple inversion or retractions. No axillary or supraclavicular lymphadenopathy. Tender palpable mass deep to the skin approximately 1 cm x 1 cm in the 5 o'clock position of the breast approximately 10 cm from the nipple. ABDOMEN: soft, non-tender. Bowel sounds normal. No masses,  no organomegaly,   EXTREMITIES:  extremities normal, atraumatic, no cyanosis or edema,   SKIN: Normal.,   NEUROLOGIC: AOx3. Cranial nerves 2-12 and sensation grossly intact. ,     Data Review:  to be done    Impression:     · Pt with history of stage 2 triple negative cancer of the right breast s/p adjuvant therapy.     Plan:     · Removal of left chest wall mediport  · Consent on chart  · Preoperative orders written

## 2017-05-01 NOTE — OP NOTES
[place] [address] [address]     OPERATIVE REPORT     PATIENT: Nicki Teixeira  MRN: [id number] DATE: 2017  BILLIN  ROOM: [facility]    ATTENDING: Claire Hernandez MD   DICTATING: Claire Hernandez MD     PREOPERATIVE DIAGNOSIS: Stage II right breast cancer  POSTOPERATIVE DIAGNOSIS: Same   PROCEDURE PERFORMED: Removal of left chest wall MediPort  SURGEON: Wiasm Weiner MD   ASSISTANT: Ced Bell SA   ANESTHESIA: Monitored anesthesia care and local (0.25% Marcaine with epinephrine). FINDINGS: Intact MediPort. SPECIMEN: MediPort. ESTIMATED BLOOD LOSS: 20 mL   FLUIDS GIVEN: 5 mL   COMPLICATIONS: None. INDICATION:  MediPort no longer needed. DESCRIPTION OF PROCEDURE: The patient was identified in the holding area,   where consent for MediPort removal was verified. The chest wall was marked   with the surgeon's initials to ensure correct site surgery. The patient did   receive perioperative antibiotics. In the operating room, the patient was   placed under monitored anesthesia care. Both arms were tucked. The chest   wall was prepped and draped in sterile fashion using chlorhexidine solution   and sterile drapes. The time-out was performed to ensure correct procedure. Local anesthetic was infiltrated into the skin and deep dermal tissues at   the prior incision and surrounding the port. The 15 blade was used to make   an incision in the old incision. The retractors were inserted and   dissection proceeded to remove the port and the catheter. The fibrous   capsule surrounding the port was then also removed. Hemostasis was obtained   using electrocautery. A 3-0 Vicryl suture and 4-0 Monocryl suture, along with Dermabond, were used to seal the wound. The patient tolerated the procedure very well. DISPOSITION: He was stable upon transport to the recovery room.

## 2017-05-01 NOTE — IP AVS SNAPSHOT
[address] [address] [address] Patient: Luis Armando James MRN: [id number] :1955 You are allergic to the following Allergen Reactions Pcn (Penicillins) Rash Recent Documentation Height Weight BMI OB Status Smoking Status 1.6 m 120.4 kg 47.03 kg/m2 Postmenopausal Former Smoker Emergency Contacts Name Discharge Info Relation Home Work Mobile Lissa Chua DISCHARGE CAREGIVER [3] Mother [14] [phone] About your hospitalization You were admitted on:  May 1, 2017 You last received care in the:  [facility] 2 RECOVERY You were discharged on:  May 1, 2017 Unit phone number:  [phone] Why you were hospitalized Your primary diagnosis was:  Not on File Providers Seen During Your Hospitalizations Provider Role Specialty Primary office phone Dudley Amaral MD Attending Provider General Surgery [phone] Your Primary Care Physician (PCP) Primary Care Physician Office Phone Office Fax Cherrie Bates [phone] Follow-up Information Follow up With Details Comments Contact Info Gavino Espinosa MD   [address] [place] [address] [address] [address] 
[phone] Dudley Amaral MD Follow up in 2 week(s)  27 [facility] [address] 200 [facility] [address] 
[phone] Current Discharge Medication List  
  
START taking these medications Dose & Instructions Dispensing Information Comments Morning Noon Evening Bedtime  
 docusate sodium 100 mg capsule Commonly known as:  Christopher Pierre Your last dose was: Your next dose is:    
   
   
 Dose:  100 mg Take 1 Cap by mouth two (2) times a day for 30 days. Quantity:  60 Cap Refills:  0  
     
   
   
   
  
 oxyCODONE-acetaminophen 5-325 mg per tablet Commonly known as:  PERCOCET Your last dose was: Your next dose is:    
   
   
 Dose:  1-2 Tab Take 1-2 Tabs by mouth every four (4) hours as needed for Pain. Max Daily Amount: 12 Tabs. Quantity:  40 Tab Refills:  0 CONTINUE these medications which have NOT CHANGED Dose & Instructions Dispensing Information Comments Morning Noon Evening Bedtime  
 citalopram 20 mg tablet Commonly known as:  Noemí Hughes Your last dose was: Your next dose is:    
   
   
 Dose:  20 mg Take 1 Tab by mouth daily. Quantity:  30 Tab Refills:  3  
     
   
   
   
  
 furosemide 40 mg tablet Commonly known as:  LASIX Your last dose was: Your next dose is:    
   
   
 Dose:  40 mg Take 1 Tab by mouth two (2) times a day. Quantity:  60 Tab Refills:  3 GARCINIA CAMBOGIA PO Your last dose was: Your next dose is: Take  by mouth. Indications: 2 tablets daily Refills:  0  
     
   
   
   
  
 HAIR,SKIN AND NAILS PO Your last dose was: Your next dose is: Take  by mouth. Refills:  0  
     
   
   
   
  
 isosorbide mononitrate ER 30 mg tablet Commonly known as:  IMDUR Your last dose was: Your next dose is: TAKE 1 TABLET BY MOUTH EVERY MORNING. Quantity:  30 Tab Refills:  5  
     
   
   
   
  
 metoprolol tartrate 50 mg tablet Commonly known as:  LOPRESSOR Your last dose was: Your next dose is:    
   
   
 Dose:  50 mg Take 1 Tab by mouth two (2) times a day. Quantity:  60 Tab Refills:  6 MULTI VITAMIN PO Your last dose was: Your next dose is: Take  by mouth. Refills:  0  
     
   
   
   
  
 spironolactone 50 mg tablet Commonly known as:  ALDACTONE Your last dose was:     
   
Your next dose is:    
   
   
 Dose:  50 mg  
 Take 1 Tab by mouth two (2) times a day. Quantity:  60 Tab Refills:  3 VITAMIN D3 2,000 unit Tab Generic drug:  cholecalciferol (vitamin D3) Your last dose was: Your next dose is: Take  by mouth. Refills:  0 Where to Get Your Medications These medications were sent to [address], [address] [facility], [facility]  [address] Phone:  [phone]  
  docusate sodium 100 mg capsule Information on where to get these meds will be given to you by the nurse or doctor. ! Ask your nurse or doctor about these medications  
  oxyCODONE-acetaminophen 5-325 mg per tablet Discharge Instructions [facility] Aileen Herron MD, FACS General Surgery Pt may shower. Allow soap and water to run over the incision. No driving or operating heavy machinery while on narcotic pain medications. No strenuous activity or contact sports for two weeks. No lifting greater than 15 lbs for 2 weeks. Call MD for any redness, swelling, bleeding or pus at the incision. Also call for any nausea, vomiting, increased pain or pain uncontrolled by pain medicine. DISCHARGE SUMMARY from Nurse The following personal items are in your possession at time of discharge: 
 
Dental Appliances: None Visual Aid: None Home Medications: None Jewelry: None Clothing: Pants, Shirt, Footwear, Undergarments Other Valuables:  (wallet- with friend \"Robinian\") PATIENT INSTRUCTIONS: 
 
 
F-face looks uneven A-arms unable to move or move unevenly S-speech slurred or non-existent T-time-call 911 as soon as signs and symptoms begin-DO NOT go Back to bed or wait to see if you get better-TIME IS BRAIN. Warning Signs of HEART ATTACK Call 911 if you have these symptoms: ? Chest discomfort. Most heart attacks involve discomfort in the center of the chest that lasts more than a few minutes, or that goes away and comes back. It can feel like uncomfortable pressure, squeezing, fullness, or pain. ? Discomfort in other areas of the upper body. Symptoms can include pain or discomfort in one or both arms, the back, neck, jaw, or stomach. ? Shortness of breath with or without chest discomfort. ? Other signs may include breaking out in a cold sweat, nausea, or lightheadedness. Don't wait more than five minutes to call [address]! Fast action can save your life. Calling 911 is almost always the fastest way to get lifesaving treatment. Emergency Medical Services staff can begin treatment when they arrive  up to an hour sooner than if someone gets to the hospital by car. The discharge information has been reviewed with the patient. The patient verbalized understanding. Discharge medications reviewed with the patient and appropriate educational materials and side effects teaching were provided. Narcotic-Analgesic/Acetaminophen (Percocet, Norco, Lorcet HD, Lortab 10/325) - (By mouth) Why this medicine is used:  
Relieves pain. Contact a nurse or doctor right away if you have: 
· Extreme weakness, shallow breathing, slow heartbeat · Severe confusion, lightheadedness, dizziness, fainting · Yellow skin or eyes, dark urine or pale stools · Severe constipation, severe stomach pain, nausea, vomiting, loss of appetite · Sweating or cold, clammy skin Common side effects: · Mild constipation, nausea, vomiting · Sleepiness, tiredness · Itching, rash © 2017 [address]  Information is for End User's use only and may not be sold, redistributed or otherwise used for commercial purposes. Laxative, Stool Softeners (Doculax, Colace, Colace Clear, DSS) - (By mouth) Why this medicine is used: Treats constipation by helping you have a bowel movement. Contact a nurse or doctor right away if you have: · Dark urine or pale stools · Vomiting, loss of appetite, stomach pain · Yellow skin or eyes Common side effects: 
· Nausea, diarrhea, stomach cramps, bitter taste in mouth © 2017 [address] Information is for End User's use only and may not be sold, redistributed or otherwise used for commercial purposes. Discharge Orders None [employer] Announcement We are excited to announce that we are making your provider's discharge notes available to you in [employer]. You will see these notes when they are completed and signed by the physician that discharged you from your recent hospital stay. If you have any questions or concerns about any information you see in [employer], please call the Health Information Department where you were seen or reach out to your Primary Care Provider for more information about your plan of care. Introducing [facility] [place] [facility]! Dear [place]: 
Thank you for requesting a [employer] account. Our records indicate that you already have an active [employer] account. You can access your account anytime at [email or web address] "[email or web address] Did you know that you can access your hospital and ER discharge instructions at any time in [employer]? You can also review all of your test results from your hospital stay or ER visit. Additional Information If you have questions, please visit the Frequently Asked Questions section of the [employer] website at [email or web address] Remember, [employer] is NOT to be used for urgent needs. For medical emergencies, dial 911. Now available from your iPhone and Android! General Information Please provide this summary of care documentation to your next provider. Patient Signature:  ____________________________________________________________ Date:  ____________________________________________________________  
  
Dagmar Nikita Provider Signature:  ____________________________________________________________ Date:  ____________________________________________________________

## 2017-05-01 NOTE — INTERVAL H&P NOTE
H&P Update:  Tevin Abdi was seen and examined. History and physical has been reviewed. The patient has been examined.  There have been no significant clinical changes since the completion of the originally dated History and Physical.    Signed By: Sadie Griffin MD     May 1, 2017 9:40 AM

## 2017-05-01 NOTE — ANESTHESIA PREPROCEDURE EVALUATION
Anesthetic History   No history of anesthetic complications       Comments:   Risk Factors for Postoperative nausea/vomiting:       History of postoperative nausea/vomiting? NO       Female? YES       Motion sickness? NO       Intended opioid administration for postoperative analgesia?   NO     Review of Systems / Medical History  Patient summary reviewed and pertinent labs reviewed    Pulmonary        Sleep apnea: CPAP  Shortness of breath  Asthma        Neuro/Psych         Psychiatric history     Cardiovascular    Hypertension: well controlled      CHF: NYHA Classification II, dyspnea on exertion    CAD and hyperlipidemia      Comments: Normal sinus rhythm   Right bundle branch block   Left anterior fascicular block   Bifascicular block   Voltage criteria for left ventricular hypertrophy   Abnormal ECG   When compared with ECG of 23-MAR-2016 09:38,   T wave inversion now evident in Anterior leads   GI/Hepatic/Renal     GERD: well controlled      Liver disease     Endo/Other        Morbid obesity, arthritis and cancer     Other Findings            Physical Exam    Airway  Mallampati: III  TM Distance: 4 - 6 cm  Neck ROM: normal range of motion   Mouth opening: Normal     Cardiovascular    Rhythm: regular  Rate: normal         Dental    Dentition: Poor dentition     Pulmonary            Prolonged expiration     Abdominal  GI exam deferred       Other Findings            Anesthetic Plan    ASA: 3  Anesthesia type: MAC          Induction: Intravenous  Anesthetic plan and risks discussed with: Patient

## 2017-05-01 NOTE — ANESTHESIA POSTPROCEDURE EVALUATION
Post-Anesthesia Evaluation and Assessment    Patient: Isaias Ledezma MRN: [id number]  SSN: [id number]    YOB: 1955  Age: 59 y.o. Sex: female       Cardiovascular Function/Vital Signs  Visit Vitals    /56    Pulse 82    Temp 36.6 °C (97.8 °F)    Resp 22    Ht 5' 3\" (1.6 m)    Wt 120.4 kg (265 lb 8 oz)    SpO2 92%    BMI 47.03 kg/m2       Patient is status post MAC anesthesia for Procedure(s):  REMOVAL OF LEFT CHEST WALL MEDIPORT. Nausea/Vomiting: None    Postoperative hydration reviewed and adequate. Pain:  Pain Scale 1: Numeric (0 - 10) (05/01/17 1113)  Pain Intensity 1: 0 (05/01/17 1113)   Managed    Neurological Status:   Neuro (WDL): Within Defined Limits (05/01/17 1113)   At baseline    Mental Status and Level of Consciousness: Arousable    Pulmonary Status:   O2 Device: Room air (05/01/17 1130)   Adequate oxygenation and airway patent    Complications related to anesthesia: None    Post-anesthesia assessment completed.  No concerns    Signed By: Tor Vargas MD     May 1, 2017

## 2017-05-01 NOTE — DISCHARGE SUMMARY
[address] [place] [address] [facility]  Baudilio Lucia MD, [facility]  General Surgery  Discharge Summary     Patient ID:  Annabella Gooden  [id number]  10 y.o.  1955    Admit Date: 5/1/2017    Discharge Date: 5/1/2017    Admission Diagnoses: Stage III carcinoma of breast ER-, right [C50.911, Z17.1]    Discharge Diagnoses:    Problem List as of 5/1/2017  Date Reviewed: 5/1/2017          Codes Class Noted - Resolved    Malignant neoplasm of female breast ([facility] ICD-10-CM: C50.919  ICD-9-CM: 174.9  4/20/2016 - Present    Overview Signed 4/20/2016  1:15 PM by Deni Hogue MD     starting chemo  baseline ef normal             Personal history of malignant neoplasm of breast ICD-10-CM: Z85.3  ICD-9-CM: V10.3  4/4/2016 - Present        VIRGINIE on CPAP ICD-10-CM: G47.33, Z99.89  ICD-9-CM: 327.23, V46.8  4/20/2015 - Present        Edema ICD-10-CM: R60.9  ICD-9-CM: 782.3  4/20/2015 - Present    Overview Signed 4/20/2015 12:42 PM by Deni Hogue MD     likely dependent edema  normal ef               Angina decubitus ([facility] ICD-10-CM: I20.8  ICD-9-CM: 413.0  9/22/2014 - Present    Overview Signed 9/22/2014 11:55 AM by Deni Hogue MD     exertional chest tightness foe a few weeks  progressively worse associated with sob             Benign hypertensive heart disease without heart failure ICD-10-CM: I11.9  ICD-9-CM: 402.10  9/22/2014 - Present    Overview Signed 9/22/2014 11:55 AM by Deni Hogue MD     exertional chest tightness foe a few weeks  progressively worse associated with sob             Cardiomegaly ICD-10-CM: I51.7  ICD-9-CM: 429.3  9/22/2014 - Present    Overview Signed 9/22/2014 11:55 AM by Deni Hogue MD     exertional chest tightness foe a few weeks  progressively worse associated with sob             Abnormal nuclear cardiac imaging test ICD-10-CM: R93.1  ICD-9-CM: 794.39  9/22/2014 - Present    Overview Signed 9/22/2014 11:55 AM by Deni Hogue MD     exertional chest tightness foe a few weeks  progressively worse associated with sob             Obesity ICD-10-CM: E66.9  ICD-9-CM: 278.00  9/22/2014 - Present    Overview Signed 9/22/2014 11:55 AM by Meme Gregorio MD     exertional chest tightness foe a few weeks  progressively worse associated with sob             Retinal detachment ICD-10-CM: H33.20  ICD-9-CM: 361.9  6/30/2014 - Present    Overview Signed 6/30/2014  8:25 AM by Rachna martinez retinal detachment             RESOLVED: Intermediate coronary syndrome ([facility] ICD-10-CM: I20.0  ICD-9-CM: 411.1  9/22/2014 - 5/20/2016    Overview Signed 9/22/2014 11:55 AM by Meme Gregorio MD     increasing chest tightness and sob                    Admission Condition: Good    Discharge Condition: Good    Last Procedure: Procedure(s):  REMOVAL OF LEFT CHEST [address] Course:   Normal hospital course for this procedure. Consults: None    Significant Diagnostic Studies: none    Disposition: home    Patient Instructions:   Current Discharge Medication List      START taking these medications    Details   oxyCODONE-acetaminophen (PERCOCET) 5-325 mg per tablet Take 1-2 Tabs by mouth every four (4) hours as needed for Pain. Max Daily Amount: 12 Tabs. Qty: 40 Tab, Refills: 0      docusate sodium (COLACE) 100 mg capsule Take 1 Cap by mouth two (2) times a day for 30 days. Qty: 60 Cap, Refills: 0         CONTINUE these medications which have NOT CHANGED    Details   MULTIVITAMIN WITH MINERALS (HAIR,SKIN AND NAILS PO) Take  by mouth. CHROM GUILHERME/BRINDAL BERRY (GARCINIA CAMBOGIA PO) Take  by mouth. Indications: 2 tablets daily      furosemide (LASIX) 40 mg tablet Take 1 Tab by mouth two (2) times a day. Qty: 60 Tab, Refills: 3    Associated Diagnoses: Benign hypertensive heart disease without heart failure      citalopram (CELEXA) 20 mg tablet Take 1 Tab by mouth daily.   Qty: 30 Tab, Refills: 3    Associated Diagnoses: Depression, unspecified depression type      spironolactone (ALDACTONE) 50 mg tablet Take 1 Tab by mouth two (2) times a day. Qty: 60 Tab, Refills: 3    Associated Diagnoses: Benign hypertensive heart disease without heart failure      MULTIVIT-MINERALS/FERROUS FUM (MULTI VITAMIN PO) Take  by mouth. cholecalciferol, vitamin D3, (VITAMIN D3) 2,000 unit tab Take  by mouth.      isosorbide mononitrate ER (IMDUR) 30 mg tablet TAKE 1 TABLET BY MOUTH EVERY MORNING. Qty: 30 Tab, Refills: 5      metoprolol tartrate (LOPRESSOR) 50 mg tablet Take 1 Tab by mouth two (2) times a day. Qty: 60 Tab, Refills: 6           Activity: See surgical instructions  Diet: Low fat, Low cholesterol  Wound Care: As directed    Follow-up with Dr. Kvng Diaz in 2 weeks.   Follow-up tests/labs None    Signed:  Nate Barry MD  5/1/2017  11:16 AM

## 2017-05-01 NOTE — DISCHARGE INSTRUCTIONS
[facility]  Arun Tripp MD, FACS  General Surgery    Pt may shower. Allow soap and water to run over the incision. No driving or operating heavy machinery while on narcotic pain medications. No strenuous activity or contact sports for two weeks. No lifting greater than 15 lbs for 2 weeks. Call MD for any redness, swelling, bleeding or pus at the incision. Also call for any nausea, vomiting, increased pain or pain uncontrolled by pain medicine. DISCHARGE SUMMARY from Nurse    The following personal items are in your possession at time of discharge:    Dental Appliances: None  Visual Aid: None     Home Medications: None  Jewelry: None  Clothing: Pants, Shirt, Footwear, Undergarments  Other Valuables:  (wallet- with friend \"Bellallian\")   PATIENT INSTRUCTIONS:    After general anesthesia or intravenous sedation, for 24 hours or while taking prescription Narcotics:  · Limit your activities  · Do not drive and operate hazardous machinery  · Do not make important personal or business decisions  · Do  not drink alcoholic beverages  · If you have not urinated within 8 hours after discharge, please contact your surgeon on call. Report the following to your surgeon:  · Excessive pain, swelling, redness or odor of or around the surgical area  · Temperature over 100.5  · Nausea and vomiting lasting longer than 4 hours or if unable to take medications  · Any signs of decreased circulation or nerve impairment to extremity: change in color, persistent  numbness, tingling, coldness or increase pain  · Any questions    *  Please give a list of your current medications to your Primary Care Provider. *  Please update this list whenever your medications are discontinued, doses are      changed, or new medications (including over-the-counter products) are added. *  Please carry medication information at all times in case of emergency situations.     These are general instructions for a healthy lifestyle:    No smoking/ No tobacco products/ Avoid exposure to second hand smoke    Surgeon General's Warning:  Quitting smoking now greatly reduces serious risk to your health. Obesity, smoking, and sedentary lifestyle greatly increases your risk for illness    A healthy diet, regular physical exercise & weight monitoring are important for maintaining a healthy lifestyle    You may be retaining fluid if you have a history of heart failure or if you experience any of the following symptoms:  Weight gain of 3 pounds or more overnight or 5 pounds in a week, increased swelling in our hands or feet or shortness of breath while lying flat in bed. Please call your doctor as soon as you notice any of these symptoms; do not wait until your next office visit. Recognize signs and symptoms of STROKE:    F-face looks uneven    A-arms unable to move or move unevenly    S-speech slurred or non-existent    T-time-call 911 as soon as signs and symptoms begin-DO NOT go       Back to bed or wait to see if you get better-TIME IS BRAIN. Warning Signs of HEART ATTACK     Call 911 if you have these symptoms:   Chest discomfort. Most heart attacks involve discomfort in the center of the chest that lasts more than a few minutes, or that goes away and comes back. It can feel like uncomfortable pressure, squeezing, fullness, or pain.  Discomfort in other areas of the upper body. Symptoms can include pain or discomfort in one or both arms, the back, neck, jaw, or stomach.  Shortness of breath with or without chest discomfort.  Other signs may include breaking out in a cold sweat, nausea, or lightheadedness. Don't wait more than five minutes to call 911 - MINUTES MATTER! Fast action can save your life. Calling 911 is almost always the fastest way to get lifesaving treatment. Emergency Medical Services staff can begin treatment when they arrive -- up to an hour sooner than if someone gets to the hospital by car. The discharge information has been reviewed with the patient. The patient verbalized understanding. Discharge medications reviewed with the patient and appropriate educational materials and side effects teaching were provided. Narcotic-Analgesic/Acetaminophen (Percocet, Norco, Lorcet HD, Lortab 10/325) - (By mouth)   Why this medicine is used:   Relieves pain. Contact a nurse or doctor right away if you have:  · Extreme weakness, shallow breathing, slow heartbeat  · Severe confusion, lightheadedness, dizziness, fainting  · Yellow skin or eyes, dark urine or pale stools  · Severe constipation, severe stomach pain, nausea, vomiting, loss of appetite  · Sweating or cold, clammy skin     Common side effects:  · Mild constipation, nausea, vomiting  · Sleepiness, tiredness  · Itching, rash  © 2017 [address] [facility] Information is for End User's use only and may not be sold, redistributed or otherwise used for commercial purposes. Laxative, Stool Softeners (Doculax, Colace, Colace Clear, DSS) - (By mouth)   Why this medicine is used:   Treats constipation by helping you have a bowel movement. Contact a nurse or doctor right away if you have:  · Dark urine or pale stools  · Vomiting, loss of appetite, stomach pain  · Yellow skin or eyes     Common side effects:  · Nausea, diarrhea, stomach cramps, bitter taste in mouth  © 2017 [address] is for End User's use only and may not be sold, redistributed or otherwise used for commercial purposes.

## 2017-05-05 NOTE — TELEPHONE ENCOUNTER
Patient made aware of abnormal lab(diabetic) results no medication to start . Verified name and . Patient verbalized an understanding of results and did not voice any concerns at this time. Patient did not want to schedule a follow up to discuss lab results , informed that supplies sent to pharmacy check  blood sugar in the morning, will keep July follow up appointment.

## 2017-05-16 NOTE — PATIENT INSTRUCTIONS
Mammogram in October followed by clinical exam in this office. Mammogram: About This Test  What is it? A mammogram is an X-ray of the breast that is used to screen for breast cancer. This test can find tumors that are too small for you or your doctor to feel. Cancer is most easily treated and cured when it is found at an early stage. Why is this test done? A mammogram is done to:  · Look for breast cancer in women who don't have symptoms. · Find breast cancer in women who have symptoms. Symptoms of breast cancer may include a lump or thickening in the breast, nipple discharge, or dimpling of the skin on one area of the breast.  · Find an area of suspicious breast tissue to remove for an exam under a microscope (biopsy). How can you prepare for the test?  · Tell your doctor if you:  ¨ Are or might be pregnant. ¨ Are breastfeeding. ¨ Have breast implants. ¨ Have previously had a breast biopsy. · On the day of the test, don't use any deodorant, perfume, powders, or ointments. What happens before the test?  · You will need to take off any jewelry that might interfere with the X-ray pictures. · You will need to take off your clothes above the waist.  · You will be given a cloth or paper gown to use during the test.  What happens during the test?  · You usually stand during a mammogram.  · One at a time, your breasts will be placed on a flat plate that contains the X-ray film. · Another plate is then pressed firmly against your breast to help flatten out the breast tissue. You may be asked to lift your arm. · For a few seconds while the X-ray picture is being taken, you will need to hold your breath. · At least two pictures are taken of each breast. One is taken from the top and one from the side. What else should you know about the test?  · The X-ray plate will feel cold when you place your breast on it. Having your breasts flattened and squeezed isn't comfortable.  But it is necessary to flatten out the breast tissue to get the best pictures. · Mammograms do not prevent breast cancer or reduce a woman's risk of developing cancer. · Most things that are found during a mammogram are not breast cancer. How long does the test take? · The test will take about 10 to 15 minutes. You may be in the clinic for up to an hour. What happens after the test?  · You will probably be able to go home right away. · You can go back to your usual activities right away. Follow-up care is a key part of your treatment and safety. Be sure to make and go to all appointments, and call your doctor if you are having problems. It's also a good idea to keep a list of the medicines you take. Ask your doctor when you can expect to have your test results. Where can you learn more? Go to http://lis-barry.info/. Enter X255 in the search box to learn more about \"Mammogram: About This Test.\"  Current as of: July 26, 2016  Content Version: 11.2  © 2195-5700 [employer]. Care instructions adapted under license by [employer] (which disclaims liability or warranty for this information). If you have questions about a medical condition or this instruction, always ask your healthcare professional. [address] any warranty or liability for your use of this information.

## 2017-06-22 NOTE — PROGRESS NOTES
Patient: Isaias Ledezma                MRN: [id number]       SSN: [id number]  YOB: 1955        AGE: 59 y.o. SEX: female  Body mass index is 46.06 kg/(m^2). PCP: Dorian Echavarria MD  06/22/17    HISTORY: Ms. Franklin Madison returns in follow up with complaints of right knee pain and left shoulder pain. No particular trauma. The injections of the right knee do help. She is requesting another one. The pain is mild to moderate. With regards to the left shoulder, no trauma, but it does hurt her to reach overhead. She has some pain at night and putting a bra on. Personal hygiene can be uncomfortable for her as well. She is otherwise feeling well. No fevers or chills. She denies shortness of breath or chest pain. All systems are reviewed and are updated on the EMR. PHYSICAL EXAMINATION:  BMI is 46 today. She moves the head and neck adequately. There is no respiratory compromise or indrawing. The left shoulder has about 110 degrees forward elevation and 80 degrees abduction. ER strength is actually fairly well preserved. Emerson sign is positive. Good  strength. Biceps tendon is intact and only mildly tender. Good pulse. Good sensation to the hand. Compartments are soft. With regards to the hips, they rotate adequately. The right knee has varus malalignment and a few degree fixed flexion deformity. There is slight effusion. Calf is non-tender. Homans sign is negative. There is mild evidence of neuropathy distally with sharp testing. RADIOGRAPHS:  Three views of the shoulder reveal a type 2.5 hooked acromion. No fracture identified. IMPRESSION:  My overall impression is impingement bursitis of the left shoulder and severe arthritis, right knee, still responding well to nonoperative measures.      PROCEDURE:  Under aseptic conditions and after informed, written consent with a time out, the left shoulder and right knee was injected with 1 cc of the Celestone preparation, i.e. 6 mg, which was well tolerated. PLAN:  She will return to see us in three months for follow-up assessment. REVIEW OF SYSTEMS:      CON: negative for weight loss, fever  EYE: negative for double vision  ENT: negative for hoarseness  RS:   negative for Tb  GI:    negative for blood in stool  :  negative for blood in urine  Other systems reviewed and noted below.           Past Medical History:   Diagnosis Date    Abnormal nuclear cardiac imaging test 9/22/2014    exertional chest tightness foe a few weeks progressively worse associated with sob     Arthritis     Asthma     Over 1 yr last problem with asthma had to use inhaler    Benign hypertensive heart disease without heart failure 9/22/2014    exertional chest tightness foe a few weeks progressively worse associated with sob     Breast cancer ([address] 2014    right    Cancer ([address] 12/5/14    Cancer ([address] 4-4-16    lymph nodes    Cardiomegaly 9/22/2014    exertional chest tightness foe a few weeks progressively worse associated with sob     Congestive heart failure, unspecified     Depression 01/1989    Diabetes (HCC)     GERD (gastroesophageal reflux disease)     GERD (gastroesophageal reflux disease)     Heart failure (HCC)     Hypertension     Intermediate coronary syndrome ([address] 9/22/2014    increasing chest tightness and sob     Irregular heart beat     Palpitations    Obesity, unspecified 9/22/2014    exertional chest tightness foe a few weeks progressively worse associated with sob     Other and unspecified angina pectoris 9/22/2014    exertional chest tightness foe a few weeks progressively worse associated with sob     Other and unspecified angina pectoris 9/22/2014    exertional chest tightness foe a few weeks progressively worse associated with sob     Sleep apnea     cpap    Unspecified sleep apnea     cpap       Family History   Problem Relation Age of Onset    Arthritis-osteo Mother     Hypertension Mother    [facility] Arthritis-osteo Maternal Grandmother     Heart Disease Maternal Grandmother     Hypertension Maternal Grandmother     Arthritis-osteo Paternal Grandmother     Cancer Paternal Grandmother     Heart Disease Paternal Grandmother     Hypertension Paternal Grandmother     Arthritis-osteo Father     Cancer Father     Heart Disease Father     Hypertension Father     Lung Disease Father     Stroke Father     Cancer Paternal Grandfather     Lung Disease Paternal Grandfather     Stroke Paternal Grandfather     Hypertension Maternal Grandfather     Stroke Maternal Grandfather        Current Outpatient Prescriptions   Medication Sig Dispense Refill    betamethasone (CELESTONE SOLUSPAN) 6 mg/mL injection 1 mL by Intra artICUlar route once for 1 dose. 1 mL 0    betamethasone (CELESTONE SOLUSPAN) 6 mg/mL injection 1 mL by Intra artICUlar route once for 1 dose. 1 mL 0    Lactobacillus Acidoph & Bulgar (FLORANEX) 1 million cell tab tablet Take 2 Tabs by mouth two (2) times a day. 120 Tab 3    Lactobacillus acidophilus (FLORAJEN) 460 mg (20 billion cell) cap Take 1 Cap by mouth two (2) times a day. 62 Cap 3    MULTIVITAMIN WITH MINERALS (HAIR,SKIN AND NAILS PO) Take  by mouth.  CHROM GUILHERME/BRINDAL BERRY (GARCINIA CAMBOGIA PO) Take  by mouth. Indications: 2 tablets daily      furosemide (LASIX) 40 mg tablet Take 1 Tab by mouth two (2) times a day. 60 Tab 3    citalopram (CELEXA) 20 mg tablet Take 1 Tab by mouth daily. 30 Tab 3    spironolactone (ALDACTONE) 50 mg tablet Take 1 Tab by mouth two (2) times a day. 60 Tab 3    MULTIVIT-MINERALS/FERROUS FUM (MULTI VITAMIN PO) Take  by mouth.  cholecalciferol, vitamin D3, (VITAMIN D3) 2,000 unit tab Take  by mouth.  isosorbide mononitrate ER (IMDUR) 30 mg tablet TAKE 1 TABLET BY MOUTH EVERY MORNING. 30 Tab 5    metoprolol tartrate (LOPRESSOR) 50 mg tablet Take 1 Tab by mouth two (2) times a day.  60 Tab 6    oxyCODONE-acetaminophen (PERCOCET) 5-325 mg per tablet Take 1-2 Tabs by mouth every four (4) hours as needed for Pain. Max Daily Amount: 12 Tabs. 40 Tab 0       Allergies   Allergen Reactions    Pcn [Penicillins] Rash       Past Surgical History:   Procedure Laterality Date    BIOPSY OF BREAST, INCISIONAL      LT BENIGN, 15 YRS. AGO    CARDIAC SURG PROCEDURE UNLIST      HX BREAST BIOPSY Left     HX BREAST BIOPSY Right 12/5/2014    RIGHT BREAST BIOPSY WITH NEEDLE LOCALIZATION ULTRASOUND TIMES TWO performed by Joi Spring MD at [address] [place] [address] HX BREAST BIOPSY Right 2/11/2015    RIGHT BREAST EXPLORATION performed by Joi Spring MD at [address] [place] [address] HX BUNIONECTOMY Right     HX CATARACT REMOVAL Bilateral     HX CATARACT REMOVAL      HX COLONOSCOPY      HX GYN      Laparoscopy    HX MASTECTOMY Right 2/4/2015    RIGHT PARTIAL MASTECTOMY/SENTINEL LYMPH NODE BIOPSY/POSSIBLE AXILLARY LYMPH NODE DISECTION performed by Joi Spring MD at [address] [place] [address] HX MASTECTOMY Right 3/25/2016    RIGHT AXILLARY LYMPH NODE DISECTION/RIGHT PARTIAL MASTECTOMY performed by Joi Spring MD at [facility] MAIN OR    HX ORTHOPAEDIC Right     bunionectomy    HX VASCULAR ACCESS  2016    mediport    MS INSJ TUNNELED CTR VAD W/SUBQ PORT AGE 5 YR/>  5-4-16    Dr. Marielos Kerr VAD W/SUBQ PORT/ CTR/PRPH INSJ N/A 05/01/2017    Dr. Rita Martinez History     Social History    Marital status: SINGLE     Spouse name: N/A    Number of children: N/A    Years of education: N/A     Occupational History    Not on file.      Social History Main Topics    Smoking status: Former Smoker     Quit date: 9/22/1994    Smokeless tobacco: Never Used    Alcohol use Yes      Comment: occasionally    Drug use: No    Sexual activity: Not Currently     Partners: Male     Birth control/ protection: Surgical     Other Topics Concern    Not on file     Social History Narrative       Visit Vitals    /56 (BP 1 Location: Left arm, BP Patient Position: Sitting)    Pulse 70    Temp 98 °F (36.7 °C) (Oral)    Wt 260 lb (117.9 kg)    LMP 05/01/1993    BMI 46.06 kg/m2         PHYSICAL EXAMINATION:  GENERAL: Alert and oriented x3, in no acute distress, well-developed, well-nourished, afebrile. HEART: No JVD. EYES: No scleral icterus   NECK: No significant lymphadenopathy   LUNGS: No respiratory compromise or indrawing  ABDOMEN: Soft, non-tender, non-distended. Electronically signed by:  Danielle Adrian MD

## 2017-06-22 NOTE — PATIENT INSTRUCTIONS
You were given a cortisone injection today. You should follow up with us in 3 months to  the effectiveness of the injection. If you have any questions feel free to contact our office. Joint Injections: Care Instructions  Your Care Instructions  Joint injections are shots into a joint, such as the knee. They may be used to put in medicines, such as pain relievers. Or they can be used to take out fluid. Sometimes the fluid is tested in a lab. This can help find the cause of a joint problem. A corticosteroid, or steroid, shot is used to reduce inflammation in tendons or joints. It is often used to treat problems such as arthritis, tendinitis, and bursitis. Steroids can be injected directly into a painful, inflamed joint. They can also help reduce inflammation of a bursa. A bursa is a sac of fluid. It cushions and lubricates areas where tendons, ligaments, skin, muscles, or bones rub against each other. A steroid shot can sometimes help with short-term pain relief when other treatments haven't worked. If steroid shots help, pain may improve for weeks or months. Follow-up care is a key part of your treatment and safety. Be sure to make and go to all appointments, and call your doctor if you are having problems. It's also a good idea to know your test results and keep a list of the medicines you take. How can you care for yourself at home? · Put ice or a cold pack on the area for 10 to 20 minutes at a time. Put a thin cloth between the ice and your skin. · Take anti-inflammatory medicines to reduce pain, swelling, or inflammation. These include ibuprofen (Advil, Motrin) and naproxen (Aleve). Read and follow all instructions on the label. · Avoid strenuous activities for several days, especially those that put stress on the area where you got the shot. · If you have dressings over the area, keep them clean and dry. You may remove them when your doctor tells you to. When should you call for help?   Call your doctor now or seek immediate medical care if:  · You have signs of infection, such as:  ¨ Increased pain, swelling, warmth, or redness. ¨ Red streaks leading from the site. ¨ Pus draining from the site. ¨ A fever. Watch closely for changes in your health, and be sure to contact your doctor if you have any problems. Where can you learn more? Go to http://lis-barry.info/. Enter N616 in the search box to learn more about \"Joint Injections: Care Instructions. \"  Current as of: March 21, 2017  Content Version: 11.3  © 4304-9467 [employer]. Care instructions adapted under license by [employer] (which disclaims liability or warranty for this information). If you have questions about a medical condition or this instruction, always ask your healthcare professional. [address] any warranty or liability for your use of this information.

## 2017-06-22 NOTE — MR AVS SNAPSHOT
Visit Information Date & Time Provider Department Dept. Phone Encounter #  
 6/22/2017  8:00 AM Olga Jett, [address] [facility] [phone] [id number] Your Appointments 7/21/2017  9:00 AM  
ROUTINE CARE with Amaury Hills MD  
[facility] ([facility]) Appt Note: 3 mo f/u for HTN, Cardiomegaly, Pneumovax 14 [facility] [address] [facility] [address]  
[phone]  
  
   
 14 [facility] [address] 9/13/2017  9:15 AM  
ESTABLISHED PATIENT with Gretta Santoro MD  
[facility]) Appt Note: 6 months 178 [facility], [address] [place] [address]  
1731 [facility], [address] [place] [id number] [facility] Upcoming Health Maintenance Date Due  
 FOOT EXAM Q1 12/24/1963 MICROALBUMIN Q1 12/24/1963 EYE EXAM RETINAL OR DILATED Q1 12/24/1963 Pneumococcal 19-64 Highest Risk (1 of 3 - PCV13) 12/24/1972 DTaP/Tdap/Td series (1 - Tdap) 12/24/1974 FOBT Q 1 YEAR AGE 50-75 12/24/2003 ZOSTER VACCINE AGE 60> 12/24/2013 PAP AKA CERVICAL CYTOLOGY 10/24/2014 LIPID PANEL Q1 10/12/2016 INFLUENZA AGE 9 TO ADULT 8/1/2017 HEMOGLOBIN A1C Q6M 10/28/2017 BREAST CANCER SCRN MAMMOGRAM 3/14/2019 Allergies as of 6/22/2017  Review Complete On: 6/22/2017 By: Olga Jett MD  
  
 Severity Noted Reaction Type Reactions Pcn [Penicillins]  01/16/2013    Rash Current Immunizations  Never Reviewed No immunizations on file. Not reviewed this visit You Were Diagnosed With   
  
 Codes Comments Acute pain of left shoulder    -  Primary ICD-10-CM: M25.512 ICD-9-CM: 719.41 Primary osteoarthritis of right knee     ICD-10-CM: M17.11 ICD-9-CM: 715.16 Vitals BP Pulse Temp Weight(growth percentile) LMP BMI 125/56 (BP 1 Location: Left arm, BP Patient Position: Sitting) 70 98 °F (36.7 °C) (Oral) 260 lb (117.9 kg) 05/01/1993 46.06 kg/m2 OB Status Smoking Status Postmenopausal Former Smoker BMI and BSA Data Body Mass Index Body Surface Area 46.06 kg/m 2 2.29 m 2 Preferred Pharmacy Pharmacy Name Phone [id number] [address] Alexandra Banks. [phone] Your Updated Medication List  
  
   
This list is accurate as of: 6/22/17  9:07 AM.  Always use your most recent med list.  
  
  
  
  
 * betamethasone 6 mg/mL injection Commonly known as:  CELESTONE SOLUSPAN  
1 mL by Intra artICUlar route once for 1 dose. * betamethasone 6 mg/mL injection Commonly known as:  CELESTONE SOLUSPAN  
1 mL by Intra artICUlar route once for 1 dose. citalopram 20 mg tablet Commonly known as:  Richie Martyr Take 1 Tab by mouth daily. furosemide 40 mg tablet Commonly known as:  LASIX Take 1 Tab by mouth two (2) times a day. GARCINIA CAMBOGIA PO Take  by mouth. Indications: 2 tablets daily HAIR,SKIN AND NAILS PO Take  by mouth.  
  
 isosorbide mononitrate ER 30 mg tablet Commonly known as:  IMDUR  
TAKE 1 TABLET BY MOUTH EVERY MORNING. Lactobacillus Acidoph & Bulgar 1 million cell Tab tablet Commonly known as:  Fayetta Casi Take 2 Tabs by mouth two (2) times a day. Lactobacillus acidophilus 460 mg (20 billion cell) Cap Commonly known as:  Cecilio Nettie Take 1 Cap by mouth two (2) times a day. metoprolol tartrate 50 mg tablet Commonly known as:  LOPRESSOR Take 1 Tab by mouth two (2) times a day. MULTI VITAMIN PO Take  by mouth. oxyCODONE-acetaminophen 5-325 mg per tablet Commonly known as:  PERCOCET Take 1-2 Tabs by mouth every four (4) hours as needed for Pain. Max Daily Amount: 12 Tabs. spironolactone 50 mg tablet Commonly known as:  ALDACTONE  
 Take 1 Tab by mouth two (2) times a day. VITAMIN D3 2,000 unit Tab Generic drug:  cholecalciferol (vitamin D3) Take  by mouth. * Notice: This list has 2 medication(s) that are the same as other medications prescribed for you. Read the directions carefully, and ask your doctor or other care provider to review them with you. We Performed the Following AMB POC XRAY, SHOULDER; COMPLETE, 2+ [77328 CPT(R)] BETAMETHASONE ACETATE & SODIUM PHOSPHATE INJECTION 3 MG EA. [ [facility]] BETAMETHASONE ACETATE & SODIUM PHOSPHATE INJECTION 3 MG EA. [ [facility]] DRAIN/INJECT LARGE JOINT/BURSA [id number] CPT(R)] DRAIN/INJECT LARGE JOINT/BURSA [id number] CPT(R)] To-Do List   
 07/06/2017 7:30 AM  
  Appointment with [facility] RM 2 at [facility] RAD [address] ([phone]) ORAL CONTRAST/PREP   Oral Contrast/Prep from radiology  no later than one day prior to study date/time. DIET RESTRICTIONS  Nothing to eat or drink, 4 hours prior to study  May have water to take meds  May drink oral contrast if directed  GENERAL INSTRUCTIONS  If you were given premedications for IV contrast to take prior to having your study, please arrange to have someone drive you to your appointment. If you have had a creatinine level drawn within the past 30 days, please bring most recent results to your appt. MEDICATIONS  Bring a complete list of all medications you are currently taking to include prescriptions, over-the-counter meds, herbals, vitamins & any dietary supplements. RELATED STUDY INFORMATION  Bring any films, CDs, and reports related with you on the day of your exam.  This only includes studies done outside of [address] [place] [address], Abdi [address], Roxie Nation, and Kai Schultz. QUESTIONS  Notify the CT Department if you have any questions concerning your study.   Roxie Nation - Noel Garcia [phone]  
  
 07/06/2017 8:00 AM  
 Appointment with [address] 1 at [facility]  [facility] MED ([phone]) OUTSIDE FILMS  - Any outside films related to the study being scheduled should be brought with you on the day of the exam.  If this cannot be done there may be a delay in the reading of the study. STUDY DETAILS  - This is a two part test. The first part consists of the injection of our tracer, and the second part (approx. 3 hours post-injection) is the imaging part. Imaging can be 30-60 minutes in duration. MEDICATIONS  - Patient must bring a complete list of all medications currently taking to include prescriptions, over-the-counter meds, herbals, vitamins & any dietary supplements 07/06/2017 11:00 AM  
  Appointment with [address] 1 at [facility] RAD NUC MED ([phone])  
  
 09/18/2017 9:00 AM  
  Appointment with [facility] RCIH HERNADEZ  at [address] [place] [address] ([phone]) PAYMENT  For Non-Medicare patients - $15.00 will be collected from you at the time of your exam.  You will be billed $35.00 from the reading [facility]. OUTSIDE FILMS  - Any outside films related to the study being scheduled should be brought with you on the day of the exam.  If this cannot be done there may be a delay in the reading of the study. MEDICATIONS  - Patient must bring a complete list of all medications currently taking to include prescriptions, over-the-counter meds, herbals, vitamins & any dietary supplements GENERAL INSTRUCTIONS  - On the day of your exam do not use any bath powder, deodorant or lotions on the armpit area. Patient Instructions You were given a cortisone injection today. You should follow up with us in 3 months to  the effectiveness of the injection. If you have any questions feel free to contact our office. Joint Injections: Care Instructions Your Care Instructions Joint injections are shots into a joint, such as the knee.  They may be used to put in medicines, such as pain relievers. Or they can be used to take out fluid. Sometimes the fluid is tested in a lab. This can help find the cause of a joint problem. A corticosteroid, or steroid, shot is used to reduce inflammation in tendons or joints. It is often used to treat problems such as arthritis, tendinitis, and bursitis. Steroids can be injected directly into a painful, inflamed joint. They can also help reduce inflammation of a bursa. A bursa is a sac of fluid. It cushions and lubricates areas where tendons, ligaments, skin, muscles, or bones rub against each other. A steroid shot can sometimes help with short-term pain relief when other treatments haven't worked. If steroid shots help, pain may improve for weeks or months. Follow-up care is a key part of your treatment and safety. Be sure to make and go to all appointments, and call your doctor if you are having problems. It's also a good idea to know your test results and keep a list of the medicines you take. How can you care for yourself at home? · Put ice or a cold pack on the area for 10 to 20 minutes at a time. Put a thin cloth between the ice and your skin. · Take anti-inflammatory medicines to reduce pain, swelling, or inflammation. These include ibuprofen (Advil, Motrin) and naproxen (Aleve). Read and follow all instructions on the label. · Avoid strenuous activities for several days, especially those that put stress on the area where you got the shot. · If you have dressings over the area, keep them clean and dry. You may remove them when your doctor tells you to. When should you call for help? Call your doctor now or seek immediate medical care if: 
· You have signs of infection, such as: 
¨ Increased pain, swelling, warmth, or redness. ¨ Red streaks leading from the site. ¨ Pus draining from the site. ¨ A fever. Watch closely for changes in your health, and be sure to contact your doctor if you have any problems. Where can you learn more? Go to http://lis-barry.info/. Enter N616 in the search box to learn more about \"Joint Injections: Care Instructions. \" Current as of: March 21, 2017 Content Version: 11.3 © 6164-8727 InTo[employer]. Care instructions adapted under license by [employer] (which disclaims liability or warranty for this information). If you have questions about a medical condition or this instruction, always ask your healthcare professional. [address] any warranty or liability for your use of this information. Introducing [facility] & [facility]! Dear Bridger Crespo: 
Thank you for requesting a [employer] account. Our records indicate that you already have an active [employer] account. You can access your account anytime at [email or web address] Did you know that you can access your hospital and ER discharge instructions at any time in [employer]? You can also review all of your test results from your hospital stay or ER visit. Additional Information If you have questions, please visit the Frequently Asked Questions section of the [employer] website at [email or web address] Remember, [employer] is NOT to be used for urgent needs. For medical emergencies, dial 911. Now available from your iPhone and Android! Please provide this summary of care documentation to your next provider. Your primary care clinician is listed as Amaury Hills. If you have any questions after today's visit, please call [phone].

## 2017-07-07 NOTE — PROGRESS NOTES
HISTORY OF PRESENT ILLNESS  Tammi Roberts is a 59 y.o. female. HPI Comments: Presents with one week of lower back pain that radiates up into the thoracic area, bilaterally. This is sharp and severe, with muscle tightness. It is intermittent, but is worse with changes in position. Tylenol is helpful, but she has to take it regularly. The pain is gradually getting worse. No history of an injury. She has had some LBP in the past, but never this severe or prolonged. No leg pain, paresthesia, weakness, bowel/bladder symptoms. Back Pain    Pertinent negatives include no chest pain, no fever and no tingling.        Past Medical History:   Diagnosis Date    Abnormal nuclear cardiac imaging test 9/22/2014    exertional chest tightness foe a few weeks progressively worse associated with sob     Arthritis     Asthma     Over 1 yr last problem with asthma had to use inhaler    Benign hypertensive heart disease without heart failure 9/22/2014    exertional chest tightness foe a few weeks progressively worse associated with sob     Breast cancer ([address] 2014    right    Cancer ([address] 12/5/14    Cancer ([address] 4-4-16    lymph nodes    Cardiomegaly 9/22/2014    exertional chest tightness foe a few weeks progressively worse associated with sob     Congestive heart failure, unspecified     Depression 01/1989    Diabetes (HCC)     GERD (gastroesophageal reflux disease)     GERD (gastroesophageal reflux disease)     Heart failure ([facility])     Hypertension     Intermediate coronary syndrome ([address] 9/22/2014    increasing chest tightness and sob     Irregular heart beat     Palpitations    Obesity, unspecified 9/22/2014    exertional chest tightness foe a few weeks progressively worse associated with sob     Other and unspecified angina pectoris 9/22/2014    exertional chest tightness foe a few weeks progressively worse associated with sob     Other and unspecified angina pectoris 9/22/2014    exertional chest tightness foe a few weeks progressively worse associated with sob     Sleep apnea     cpap    Unspecified sleep apnea     cpap       Past Surgical History:   Procedure Laterality Date    BIOPSY OF BREAST, INCISIONAL      LT BENIGN, 15 YRS. AGO    CARDIAC SURG PROCEDURE UNLIST      HX BREAST BIOPSY Left     HX BREAST BIOPSY Right 12/5/2014    RIGHT BREAST BIOPSY WITH NEEDLE LOCALIZATION ULTRASOUND TIMES TWO performed by Waldo Hamm MD at [address] [place] [address] HX BREAST BIOPSY Right 2/11/2015    RIGHT BREAST EXPLORATION performed by Waldo Hamm MD at [address] [place] [address] HX BUNIONECTOMY Right     HX CATARACT REMOVAL Bilateral     HX CATARACT REMOVAL      HX COLONOSCOPY      HX GYN      Laparoscopy    HX MASTECTOMY Right 2/4/2015    RIGHT PARTIAL MASTECTOMY/SENTINEL LYMPH NODE BIOPSY/POSSIBLE AXILLARY LYMPH NODE DISECTION performed by Waldo Hamm MD at [facility] MAIN OR    HX MASTECTOMY Right 3/25/2016    RIGHT AXILLARY LYMPH NODE DISECTION/RIGHT PARTIAL MASTECTOMY performed by Waldo Hamm MD at [facility] MAIN OR    HX ORTHOPAEDIC Right     bunionectomy    HX VASCULAR ACCESS  2016    mediport    MI INSJ TUNNELED CTR VAD W/SUBQ PORT AGE 5 YR/>  5-4-16    Dr. Matilde Peters VAD W/SUBQ PORT/ CTR/PRPH INSJ N/A 05/01/2017    Dr. Gimenez Knee       History   Smoking Status    Former Smoker    Quit date: 9/22/1994   Smokeless Tobacco    Never Used     Current Outpatient Prescriptions   Medication Sig    Lactobacillus Acidoph & Bulgar (Sharon Carson) 1 million cell tab tablet Take 2 Tabs by mouth two (2) times a day.  MULTIVITAMIN WITH MINERALS (HAIR,SKIN AND NAILS PO) Take  by mouth.  CHROM GUILHERME/BRINDAL BERRY (GARCINIA CAMBOGIA PO) Take  by mouth. Indications: 2 tablets daily    furosemide (LASIX) 40 mg tablet Take 1 Tab by mouth two (2) times a day.  citalopram (CELEXA) 20 mg tablet Take 1 Tab by mouth daily.  spironolactone (ALDACTONE) 50 mg tablet Take 1 Tab by mouth two (2) times a day.     MULTIVIT-MINERALS/FERROUS FUM (MULTI VITAMIN PO) Take  by mouth.  cholecalciferol, vitamin D3, (VITAMIN D3) 2,000 unit tab Take  by mouth.  isosorbide mononitrate ER (IMDUR) 30 mg tablet TAKE 1 TABLET BY MOUTH EVERY MORNING.  metoprolol tartrate (LOPRESSOR) 50 mg tablet Take 1 Tab by mouth two (2) times a day. No current facility-administered medications for this visit. Review of Systems   Constitutional: Negative for chills and fever. Respiratory: Negative for shortness of breath. Cardiovascular: Negative for chest pain. Musculoskeletal: Positive for back pain. Neurological: Negative for tingling and focal weakness. Visit Vitals    /65 (BP 1 Location: Left arm, BP Patient Position: Sitting)  Comment: XL long cuff automated    Pulse 99    Temp 98.3 °F (36.8 °C) (Oral)    Resp 18    Ht 5' 3\" (1.6 m)    Wt 262 lb (118.8 kg)    LMP 05/01/1993    SpO2 97%    BMI 46.41 kg/m2       Physical Exam   Constitutional: She is oriented to person, place, and time. She appears well-developed and well-nourished. No distress. Neck: Neck supple. No thyromegaly present. Cardiovascular: Normal rate and regular rhythm. Exam reveals no gallop and no friction rub. No murmur heard. Pulmonary/Chest: Effort normal and breath sounds normal. No respiratory distress. Musculoskeletal:        Lumbar back: She exhibits normal range of motion, no tenderness and no spasm. Lymphadenopathy:     She has no cervical adenopathy. Neurological: She is alert and oriented to person, place, and time. Skin: Skin is warm and dry. Psychiatric: She has a normal mood and affect. Her behavior is normal. Judgment and thought content normal.       ASSESSMENT and PLAN    ICD-10-CM ICD-9-CM    1. Acute bilateral low back pain without sciatica M54.5 724.2 AMB POC URINALYSIS DIP STICK AUTO W/O MICRO     338.19      Follow-up Disposition:  Return if symptoms worsen or fail to improve.   the following changes in treatment are made: Ibuprofen, Flexeril  reviewed medications and side effects in detail  Plan of care reviewed - patient verbalize(s) understanding and agreement.

## 2017-07-07 NOTE — MR AVS SNAPSHOT
Visit Information Date & Time Provider Department Dept. Phone Encounter #  
 7/7/2017  2:00 PM Andi Hooks MD [email or web address] [phone] [id number] Follow-up Instructions Return if symptoms worsen or fail to improve. Your Appointments 7/21/2017  9:00 AM  
ROUTINE CARE with MD Kameron OlivasMechio ([facility]) Appt Note: 3 mo f/u for HTN, Cardiomegaly, Pneumovax 14 [facility] [address] [place] [address]  
[phone]  
  
   
 14 [address] 9/13/2017  9:15 AM  
ESTABLISHED PATIENT with Ney Roman MD  
[facility]) Appt Note: 6 months 178 [facility], [address] [facility] [address]  
13[facility], [address] Upcoming Health Maintenance Date Due  
 FOOT EXAM Q1 12/24/1963 MICROALBUMIN Q1 12/24/1963 EYE EXAM RETINAL OR DILATED Q1 12/24/1963 Pneumococcal 19-64 Highest Risk (1 of 3 - PCV13) 12/24/1972 DTaP/Tdap/Td series (1 - Tdap) 12/24/1974 FOBT Q 1 YEAR AGE 50-75 12/24/2003 ZOSTER VACCINE AGE 60> 12/24/2013 PAP AKA CERVICAL CYTOLOGY 10/24/2014 LIPID PANEL Q1 10/12/2016 INFLUENZA AGE 9 TO ADULT 8/1/2017 HEMOGLOBIN A1C Q6M 10/28/2017 BREAST CANCER SCRN MAMMOGRAM 3/14/2019 Allergies as of 7/7/2017  Review Complete On: 7/7/2017 By: Andi Hooks MD  
  
 Severity Noted Reaction Type Reactions Pcn [Penicillins]  01/16/2013    Rash Current Immunizations  Never Reviewed No immunizations on file. Not reviewed this visit You Were Diagnosed With   
  
 Codes Comments Acute bilateral low back pain without sciatica    -  Primary ICD-10-CM: M54.5 ICD-9-CM: 724.2, 338.19 Vitals BP Pulse Temp Resp Height(growth percentile) Weight(growth percentile) 124/65 (BP 1 Location: Left arm, BP Patient Position: Sitting) 99 98.3 °F (36.8 °C) (Oral) 18 5' 3\" (1.6 m) 262 lb (118.8 kg) LMP SpO2 BMI OB Status Smoking Status 05/01/1993 97% 46.41 kg/m2 Postmenopausal Former Smoker BMI and BSA Data Body Mass Index Body Surface Area  
 46.41 kg/m 2 2.3 m 2 Preferred Pharmacy Pharmacy Name Phone [id number] - [place], [address] [place] [address] [place]. [phone] Your Updated Medication List  
  
   
This list is accurate as of: 7/7/17  4:03 PM.  Always use your most recent med list.  
  
  
  
  
 citalopram 20 mg tablet Commonly known as:  Trygve Della Take 1 Tab by mouth daily. cyclobenzaprine 10 mg tablet Commonly known as:  FLEXERIL Take 1 Tab by mouth nightly. Indications: MUSCLE SPASM  
  
 furosemide 40 mg tablet Commonly known as:  LASIX Take 1 Tab by mouth two (2) times a day. GARCINIA CAMBOGIA PO Take  by mouth. Indications: 2 tablets daily HAIR,SKIN AND NAILS PO Take  by mouth. ibuprofen 600 mg tablet Commonly known as:  MOTRIN Take 1 Tab by mouth every eight (8) hours as needed for Pain. Indications: Pain  
  
 isosorbide mononitrate ER 30 mg tablet Commonly known as:  IMDUR  
TAKE 1 TABLET BY MOUTH EVERY MORNING. Lactobacillus Acidoph & Bulgar 1 million cell Tab tablet Commonly known as:  Pederson Lore Take 2 Tabs by mouth two (2) times a day. metoprolol tartrate 50 mg tablet Commonly known as:  LOPRESSOR Take 1 Tab by mouth two (2) times a day. MULTI VITAMIN PO Take  by mouth. spironolactone 50 mg tablet Commonly known as:  ALDACTONE Take 1 Tab by mouth two (2) times a day. VITAMIN D3 2,000 unit Tab Generic drug:  cholecalciferol (vitamin D3) Take  by mouth. Prescriptions Sent to Pharmacy Refills  
 ibuprofen (MOTRIN) 600 mg tablet 2 Sig: Take 1 Tab by mouth every eight (8) hours as needed for Pain. Indications: Pain Class: Normal  
 Pharmacy: [place] [address], [address] Ph #: [phone] Route: Oral  
 cyclobenzaprine (FLEXERIL) 10 mg tablet 0 Sig: Take 1 Tab by mouth nightly. Indications: MUSCLE SPASM Class: Normal  
 Pharmacy: [place] [address], [address] Ph #: [phone] Route: Oral  
  
We Performed the Following AMB POC URINALYSIS DIP STICK AUTO W/O MICRO [63308 CPT(R)] Follow-up Instructions Return if symptoms worsen or fail to improve. To-Do List   
 09/18/2017 9:00 AM  
  Appointment with KAROL BOLAÑOS at [address] [place] [address] ([phone]) PAYMENT  For Non-Medicare patients - $15.00 will be collected from you at the time of your exam.  You will be billed $35.00 from the reading [facility]. OUTSIDE FILMS  - Any outside films related to the study being scheduled should be brought with you on the day of the exam.  If this cannot be done there may be a delay in the reading of the study. MEDICATIONS  - Patient must bring a complete list of all medications currently taking to include prescriptions, over-the-counter meds, herbals, vitamins & any dietary supplements GENERAL INSTRUCTIONS  - On the day of your exam do not use any bath powder, deodorant or lotions on the armpit area. Introducing [facility] & HEALTH SERVICES! Dear Dilma Blue: 
Thank you for requesting a [employer] account. Our records indicate that you already have an active [employer] account. You can access your account anytime at [email or web address]/[facility] Did you know that you can access your hospital and ER discharge instructions at any time in [employer]? You can also review all of your test results from your hospital stay or ER visit. Additional Information If you have questions, please visit the Frequently Asked Questions section of the [employer] website at [email or web address] Remember, [employer] is NOT to be used for urgent needs. For medical emergencies, dial 911. Now available from your iPhone and Android! Please provide this summary of care documentation to your next provider. Your primary care clinician is listed as Mica Becerril. If you have any questions after today's visit, please call [phone].

## 2017-07-07 NOTE — PROGRESS NOTES
Chief Complaint   Patient presents with    Back Pain     Per patient sharp shooting,stabbing,cramping pain x1 week near kidney area. Patient states that the sx are off and on and are getting worse. 1. Have you been to the ER, urgent care clinic since your last visit? Hospitalized since your last visit? No    2. Have you seen or consulted any other health care providers outside of the [address] [place] [address] since your last visit? Include any pap smears or colon screening.  No

## 2017-07-19 NOTE — PROGRESS NOTES
Maggie Alvares. Valarie Collazo, FNP-C  Office Progress Note    Patient: Cecilia Bailey  MRN: [id number]  SSN: [id number]   YOB: 1955  Age: 59 y.o. Sex: female     Chief Complaint   Patient presents with   Kathy MOREL    Ms. Shelley Matos is a very pleasant 78-year-old AA female who is known to our practice related to personal history of right triple negative stage III breast carcinoma with Mediport placement and removal.  She is here today to discuss Mediport placement related to recent metastatic changes found on bone scan and liver CT. She is being followed by Dr. Driss Ramey for medical oncology and sees her again next Tuesday. She is scheduled for brain MRI tomorrow. She understands her current diagnosis and the need for a new  Mediport. We will schedule Mediport placement to the right chest wall for next Monday, July 24, 2017.     Past Medical History:   Diagnosis Date    Abnormal nuclear cardiac imaging test 9/22/2014    exertional chest tightness foe a few weeks progressively worse associated with sob     Arthritis     Asthma     Over 1 yr last problem with asthma had to use inhaler    Benign hypertensive heart disease without heart failure 9/22/2014    exertional chest tightness foe a few weeks progressively worse associated with sob     Breast cancer ([address] 2014    right    Cancer ([address] 12/5/14    Cancer ([address] 4-4-16    lymph nodes    Cardiomegaly 9/22/2014    exertional chest tightness foe a few weeks progressively worse associated with sob     Congestive heart failure, unspecified     Depression 01/1989    Diabetes ([address]     pt states she is not diabetic    GERD (gastroesophageal reflux disease)     GERD (gastroesophageal reflux disease)     Heart failure ([address]     Hypertension     Intermediate coronary syndrome ([address] 9/22/2014    increasing chest tightness and sob     Irregular heart beat     Palpitations    Obesity, unspecified 9/22/2014    exertional chest tightness foe a few weeks progressively worse associated with sob     Other and unspecified angina pectoris 9/22/2014    exertional chest tightness foe a few weeks progressively worse associated with sob     Other and unspecified angina pectoris 9/22/2014    exertional chest tightness foe a few weeks progressively worse associated with sob     Sleep apnea     cpap    Unspecified sleep apnea     cpap     Past Surgical History:   Procedure Laterality Date    BIOPSY OF BREAST, INCISIONAL      LT BENIGN, 15 YRS. AGO    CARDIAC SURG PROCEDURE UNLIST      HX BREAST BIOPSY Left     HX BREAST BIOPSY Right 12/5/2014    RIGHT BREAST BIOPSY WITH NEEDLE LOCALIZATION ULTRASOUND TIMES TWO performed by Raisa Monsalve MD at [address] [place] [address] HX BREAST BIOPSY Right 2/11/2015    RIGHT BREAST EXPLORATION performed by Raisa Monsalve MD at [address] [place] [address] HX BUNIONECTOMY Right     HX CATARACT REMOVAL Bilateral     HX CATARACT REMOVAL      HX COLONOSCOPY      HX GYN      Laparoscopy    HX MASTECTOMY Right 2/4/2015    RIGHT PARTIAL MASTECTOMY/SENTINEL LYMPH NODE BIOPSY/POSSIBLE AXILLARY LYMPH NODE DISECTION performed by Raisa Monsalve MD at [address] [place] [address] HX MASTECTOMY Right 3/25/2016    RIGHT AXILLARY LYMPH NODE DISECTION/RIGHT PARTIAL MASTECTOMY performed by Raisa Monsalve MD at [address] [place] [address] HX ORTHOPAEDIC Right     bunionectomy    HX VASCULAR ACCESS  2016    mediport    PA INSJ TUNNELED CTR VAD W/SUBQ PORT AGE 5 YR/>  5-4-16    Dr. Esther Godoy    PA RMVL ROM CTR VAD W/SUBQ PORT/ CTR/PRPH INSJ N/A 05/01/2017    Dr. Hernán French     Allergies   Allergen Reactions    Pcn [Penicillins] Rash     Current Outpatient Prescriptions   Medication Sig Dispense Refill    ibuprofen (MOTRIN) 600 mg tablet Take 1 Tab by mouth every eight (8) hours as needed for Pain. Indications: Pain 90 Tab 2    cyclobenzaprine (FLEXERIL) 10 mg tablet Take 1 Tab by mouth nightly. Indications:  MUSCLE SPASM 30 Tab 0    Lactobacillus Acidoph & Bulgar [facility]) 1 million cell tab tablet Take 2 Tabs by mouth two (2) times a day. 120 Tab 3    CHROM GUILHERME/BRINDAL BERRY (GARCINIA CAMBO[place]IA PO) Take  by mouth. Indications: 2 tablets daily      furosemide (LASIX) 40 mg tablet Take 1 Tab by mouth two (2) times a day. 60 Tab 3    citalopram (CELEXA) 20 mg tablet Take 1 Tab by mouth daily. 30 Tab 3    spironolactone (ALDACTONE) 50 mg tablet Take 1 Tab by mouth two (2) times a day. 60 Tab 3    MULTIVIT-MINERALS/FERROUS FUM (MULTI VITAMIN PO) Take  by mouth.  cholecalciferol, vitamin D3, (VITAMIN D3) 2,000 unit tab Take  by mouth.  isosorbide mononitrate ER (IMDUR) 30 mg tablet TAKE 1 TABLET BY MOUTH EVERY MORNING. 30 Tab 5    metoprolol tartrate (LOPRESSOR) 50 mg tablet Take 1 Tab by mouth two (2) times a day. 60 Tab 6    MULTIVITAMIN WITH MINERALS (HAIR,SKIN AND NAILS PO) Take  by mouth. Social History     Social History    Marital status: SINGLE     Spouse name: N/A    Number of children: N/A    Years of education: N/A     Occupational History    Not on file.      Social History Main Topics    Smoking status: Former Smoker     Quit date: 9/22/1994    Smokeless tobacco: Never Used    Alcohol use Yes      Comment: occasionally    Drug use: No    Sexual activity: Not Currently     Partners: Male     Birth control/ protection: Surgical     Other Topics Concern    Not on file     Social History Narrative     Family History   Problem Relation Age of Onset    Arthritis-osteo Mother     Hypertension Mother     Arthritis-osteo Maternal Grandmother     Heart Disease Maternal Grandmother     Hypertension Maternal Grandmother     Arthritis-osteo Paternal Grandmother     Cancer Paternal Grandmother     Heart Disease Paternal Grandmother     Hypertension Paternal Grandmother     Arthritis-osteo Father     Cancer Father     Heart Disease Father     Hypertension Father     Lung Disease Father     Stroke Father     Cancer Paternal Yuliet Abts Lung Disease Paternal Grandfather     Stroke Paternal Grandfather     Hypertension Maternal Grandfather     Stroke Maternal Grandfather          Review of systems:  Patient denies any reflux, emesis, change in bowel habits, difficulties voiding, hematochezia, melena, fever, unintentional weight loss, fatigue, chills, palpitations, hypertension, edema, wheezing, dizziness, or fainting. Patient denies abdominal pain, new, sharp or differing headache, new or lingering cough,or new onset shortness of breath. Patient endorses epigastric pain described as sharp, nagging, and constant. She states his pain begins centrally and radiates around her right side to her right central back. She also endorses a 9 pound weight gain in 2 weeks with difficulty lying flat stating that she has trouble breathing. Physical Examination    Visit Vitals    /78    Pulse 72    Temp 98.1 °F (36.7 °C)    Resp 18    Ht 5' 3\" (1.6 m)    Wt 121.1 kg (267 lb)    LMP 05/01/1993    BMI 47.3 kg/m2      Well developed well nourished female in no apparent distress  Head: normocephalic, atraumatic  Neck: supple, no masses, no adenopathy, trachea midline  Resp: clear to auscultation bilaterally, no wheezes, rhonchi or rales  Cardio: Regular rate and rhythm, no murmurs, no edema or varicosities  Abdomen: soft, nontender, nondistended, normoactive bowel sounds, no hernias  Back: No CVA tenderness  Extremeties: warm, well-perfused, no  tenderness, normal gait/station. Bilateral ankles 2+ edema  Neuro: sensation and strength grossly intact and symmetrical  Psych: alert and oriented to person, place and time      IMPRESSION  Patient here for Mediport placement to right chest wall related to triple negative stage III breast cancer that has metastasized to her liver and bone.       PLAN  Orders Placed This Encounter    SCHEDULE SURGERY     Scheduling Instructions:      PROCEDURE: right cephalic vein mediport placement      TIME NEEDED: 1 Hours    XR CHEST PA LAT     Standing Status:   Future     Standing Expiration Date:   8/19/2018     Order Specific Question:   Reason for Exam     Answer:   Mediport placement     Order Specific Question:   Is Patient Allergic to Contrast Dye? Answer:   No    CBC WITH AUTOMATED DIFF    METABOLIC PANEL, BASIC    EKG, 12 LEAD, INITIAL     Standing Status:   Future     Standing Expiration Date:   1/19/2018     Order Specific Question:   Reason for Exam:     Answer:   mediport placement         Ms. Chris Moss has a reminder for a \"due or due soon\" health maintenance. I have asked that she contact her primary care provider for follow-up on this health maintenance. Rebecca Olivera.  Soni Mcbride, MONICA-C

## 2017-07-19 NOTE — PATIENT INSTRUCTIONS
Learning About Breast Cancer Treatments  Your Care Instructions  Breast cancer means abnormal cells grow out of control in one or both breasts. These cancer cells can spread from the breast to nearby lymph nodes and other tissues. They can also spread to other parts of the body. The type and stage of cancer you have is based on:  · Where in the breast the cancer started. · The genetics of those cancer cells. · How far cancer has spread within the breast, to nearby tissues, or to other organs. · What the cancer cells look like under a microscope. · Whether there is cancer in the nearby lymph nodes. Tests that help find the stage of your cancer can help choose the right treatment for you. These tests can include a breast biopsy, lymph node biopsy, blood tests, and X-rays. You may need other tests as well, such as a bone, CT, or MRI scan. Whether you have more tests depends on your symptoms and the stage of the cancer. When you find out that you have cancer, you may feel many emotions and may need some help coping. Seek out family, friends, and counselors for support. You also can do things at home to make yourself feel better while you go through treatment. Call the [employer] (8-256.278.7850) or visit its website at [email or web address]. [email or web address] for more information. Follow-up care is a key part of your treatment and safety. Be sure to make and go to all appointments, and call your doctor if you are having problems. It's also a good idea to know your test results and keep a list of the medicines you take. How is breast cancer treated? Your doctor may combine treatments. This is a common way to treat breast cancer. Treatment depends on what type and stage of cancer you have. You may have:  · Surgery to remove the cancer. · Radiation. This uses high-dose X-rays to kill cancer cells and shrink tumors. · Chemotherapy. This uses medicine to kill cancer cells. · Hormone therapy.  This uses medicines such as tamoxifen. It limits the effect of the hormone estrogen. This hormone can help some types of breast cancer cells to grow. · Biological therapy. This uses medicines such as trastuzumab (Herceptin) to help your immune system fight the cancer. What surgeries are done for breast cancer? Surgery is a key part of treatment for breast cancer. The main types of surgeries are:  · Breast-conserving surgery. This is surgery that does not remove the whole breast. This includes:  ¨ Lumpectomy. This surgery removes the cancer in the breast and some of the normal tissue around it. ¨ Partial mastectomy. This surgery removes part of the breast. The lining of the chest muscles below the cancer and some of the lymph nodes may also be removed. · Surgery to remove the breast. This includes:  ¨ Total mastectomy. This removes the whole breast.  ¨ Nipple-sparing mastectomy. This removes the whole breast but leaves the nipple. ¨ Modified radical mastectomy. This removes the whole breast and the lymph nodes under the arm (axillary lymph nodes). ¨ Radical mastectomy. This removes the whole breast, the chest muscles, and all the lymph nodes under the arm. If lymph nodes under the arm are removed, they are looked at under the microscope to check for cancer. There are two types of lymph node removal:  · [place] lymph node biopsy removes the lymph node that is the first to receive lymph fluid from the tumor. If cancer has spread from the breast to the lymph nodes, cancer cells most often show up in the sentinel node first.  · Axillary lymph node dissection removes most of the lymph nodes in the armpit. The type of surgery you have depends on the size, location, and type of the cancer. It also depends on your overall health and personal preferences. Even if your doctor removes all the cancer that can be seen at the time of your surgery, you may still need more treatment.  You may get radiation, chemotherapy, or hormone therapy after surgery to try to kill any cancer cells that may be left. No matter what kind of surgery you have, you will get information about why you are having it, what its risks are, how to prepare, and what to do after surgery. Where can you learn more? Go to http://lis-barry.info/. Enter X810 in the search box to learn more about \"Learning About Breast Cancer Treatments. \"  Current as of: July 26, 2016  Content Version: 11.3  © 1052-4083 [employer]. Care instructions adapted under license by [employer] (which disclaims liability or warranty for this information). If you have questions about a medical condition or this instruction, always ask your healthcare professional. [address] any warranty or liability for your use of this information.

## 2017-07-21 PROBLEM — E78.5 HYPERLIPIDEMIA: Status: ACTIVE | Noted: 2017-01-01

## 2017-07-21 NOTE — PATIENT INSTRUCTIONS
Body Mass Index: Care Instructions  Your Care Instructions    Body mass index (BMI) can help you see if your weight is raising your risk for health problems. It uses a formula to compare how much you weigh with how tall you are. · A BMI lower than 18.5 is considered underweight. · A BMI between 18.5 and 24.9 is considered healthy. · A BMI between 25 and 29.9 is considered overweight. A BMI of 30 or higher is considered obese. If your BMI is in the normal range, it means that you have a lower risk for weight-related health problems. If your BMI is in the overweight or obese range, you may be at increased risk for weight-related health problems, such as high blood pressure, heart disease, stroke, arthritis or joint pain, and diabetes. If your BMI is in the underweight range, you may be at increased risk for health problems such as fatigue, lower protection (immunity) against illness, muscle loss, bone loss, hair loss, and hormone problems. BMI is just one measure of your risk for weight-related health problems. You may be at higher risk for health problems if you are not active, you eat an unhealthy diet, or you drink too much alcohol or use tobacco products. Follow-up care is a key part of your treatment and safety. Be sure to make and go to all appointments, and call your doctor if you are having problems. It's also a good idea to know your test results and keep a list of the medicines you take. How can you care for yourself at home? · Practice healthy eating habits. This includes eating plenty of fruits, vegetables, whole grains, lean protein, and low-fat dairy. · If your doctor recommends it, get more exercise. Walking is a good choice. Bit by bit, increase the amount you walk every day. Try for at least 30 minutes on most days of the week. · Do not smoke. Smoking can increase your risk for health problems. If you need help quitting, talk to your doctor about stop-smoking programs and medicines. These can increase your chances of quitting for good. · Limit alcohol to 2 drinks a day for men and 1 drink a day for women. Too much alcohol can cause health problems. If you have a BMI higher than 25  · Your doctor may do other tests to check your risk for weight-related health problems. This may include measuring the distance around your waist. A waist measurement of more than 40 inches in men or 35 inches in women can increase the risk of weight-related health problems. · Talk with your doctor about steps you can take to stay healthy or improve your health. You may need to make lifestyle changes to lose weight and stay healthy, such as changing your diet and getting regular exercise. If you have a BMI lower than 18.5  · Your doctor may do other tests to check your risk for health problems. · Talk with your doctor about steps you can take to stay healthy or improve your health. You may need to make lifestyle changes to gain or maintain weight and stay healthy, such as getting more healthy foods in your diet and doing exercises to build muscle. Where can you learn more? Go to http://lis-barry.info/. Enter S176 in the search box to learn more about \"Body Mass Index: Care Instructions. \"  Current as of: January 23, 2017  Content Version: 11.3  © 7550-3878 [employer]. Care instructions adapted under license by [employer] (which disclaims liability or warranty for this information). If you have questions about a medical condition or this instruction, always ask your healthcare professional. [place] [address] any warranty or liability for your use of this information. Noninsulin Medicines for Type 2 Diabetes: Care Instructions  Your Care Instructions  There are different types of noninsulin medicines for diabetes. Each works in a different way. But they all help you control your blood sugar.  Some types help your body make insulin to lower your blood sugar. Others lower how much insulin your body needs. Some can slow how fast your body digests sugars. And some can remove extra glucose through your urine. · Alpha-glucosidase inhibitors. These keep starches from breaking down. This means that they lower the amount of glucose absorbed when you eat. They don't help your body make more insulin. So they will not cause low blood sugar unless you use them with other medicines for diabetes. They include acarbose and miglitol. · DPP-4 inhibitors. These help your body raise the level of insulin after you eat. They also help your body make less of a hormone that raises blood sugar. They include linagliptin, saxagliptin, and sitagliptin. · Incretin hormones (GLP-1 receptor agonists). Your body makes a protein that can raise your insulin level. It also can lower your blood sugar and make you less hungry. You can get shots of hormones that work the same way. They include exenatide and liraglutide. · Meglitinides. These help your body release insulin. They also help slow how your body digests sugars. So they can keep your blood sugar from rising too fast after you eat. They include nateglinide and repaglinide. · Metformin. This lowers how much glucose your liver makes. And it helps you respond better to insulin. It also lowers the amount of stored sugar that your liver releases when you are not eating. · SGLT2 inhibitors. These help to remove extra glucose through your urine. They may also help some people lose weight. They include canagliflozin, dapagliflozin, and empagliflozin. · Sulfonylureas. These help your body release more insulin. Some work for many hours. They can cause low blood sugar if you don't eat as you planned. They include glipizide and glyburide. · Thiazolidinediones. These reduce the amount of blood glucose. They also help you respond better to insulin. They include pioglitazone and rosiglitazone.   You may need to take more than one medicine for diabetes. Two or more medicines may work better to lower your blood sugar level than just one does. Follow-up care is a key part of your treatment and safety. Be sure to make and go to all appointments, and call your doctor if you are having problems. It's also a good idea to know your test results and keep a list of the medicines you take. How can you care for yourself at home? · Eat a healthy diet. Get some exercise each day. This may help you to reduce how much medicine you need. · Do not take other prescription or over-the-counter medicines, vitamins, herbal products, or supplements without talking to your doctor first. Some medicines for type 2 diabetes can cause problems with other medicines or supplements. · Tell your doctor if you plan to get pregnant. Some of these drugs are not safe for pregnant women. · Be safe with medicines. Take your medicines exactly as prescribed. Meglitinides and sulfonylureas can cause your blood sugar to drop very low. Call your doctor if you think you are having a problem with your medicine. · Check your blood sugar often. You can use a glucose monitor. Keeping track can help you know how certain foods, activities, and medicines affect your blood sugar. And it can help you keep your blood sugar from getting so low that it's not safe. When should you call for help? Call 911 anytime you think you may need emergency care. For example, call if:  · You passed out (lost consciousness). · You are confused or cannot think clearly. · Your blood sugar is very high or very low. Watch closely for changes in your health, and be sure to contact your doctor if:  · Your blood sugar stays outside the level your doctor set for you. · You have any problems. Where can you learn more? Go to http://lis-barry.info/. Enter H153 in the search box to learn more about \"Noninsulin Medicines for Type 2 Diabetes: Care Instructions. \"  Current as of: March 13, 2017  Content Version: 11.3  © 6222-1834 [employer]. Care instructions adapted under license by [employer] (which disclaims liability or warranty for this information). If you have questions about a medical condition or this instruction, always ask your healthcare professional. [address] any warranty or liability for your use of this information. Learning About Diabetes Food Guidelines  Your Care Instructions  Meal planning is important to manage diabetes. It helps keep your blood sugar at a target level (which you set with your doctor). You don't have to eat special foods. You can eat what your family eats, including sweets once in a while. But you do have to pay attention to how often you eat and how much you eat of certain foods. You may want to work with a dietitian or a certified diabetes educator (CDE) to help you plan meals and snacks. A dietitian or CDE can also help you lose weight if that is one of your goals. What should you know about eating carbs? Managing the amount of carbohydrate (carbs) you eat is an important part of healthy meals when you have diabetes. Carbohydrate is found in many foods. · Learn which foods have carbs. And learn the amounts of carbs in different foods. ¨ Bread, cereal, pasta, and rice have about 15 grams of carbs in a serving. A serving is 1 slice of bread (1 ounce), ½ cup of cooked cereal, or 1/3 cup of cooked pasta or rice. ¨ Fruits have 15 grams of carbs in a serving. A serving is 1 small fresh fruit, such as an apple or orange; ½ of a banana; ½ cup of cooked or canned fruit; ½ cup of fruit juice; 1 cup of melon or raspberries; or 2 tablespoons of dried fruit. ¨ Milk and no-sugar-added yogurt have 15 grams of carbs in a serving. A serving is 1 cup of milk or 2/3 cup of no-sugar-added yogurt. ¨ Starchy vegetables have 15 grams of carbs in a serving.  A serving is ½ cup of mashed potatoes or sweet potato; 1 cup winter squash; ½ of a small baked potato; ½ cup of cooked beans; or ½ cup cooked corn or green peas. · Learn how much carbs to eat each day and at each meal. A dietitian or CDE can teach you how to keep track of the amount of carbs you eat. This is called carbohydrate counting. · If you are not sure how to count carbohydrate grams, use the Plate Method to plan meals. It is a good, quick way to make sure that you have a balanced meal. It also helps you spread carbs throughout the day. ¨ Divide your plate by types of foods. Put non-starchy vegetables on half the plate, meat or other protein food on one-quarter of the plate, and a grain or starchy vegetable in the final quarter of the plate. To this you can add a small piece of fruit and 1 cup of milk or yogurt, depending on how many carbs you are supposed to eat at a meal.  · Try to eat about the same amount of carbs at each meal. Do not \"save up\" your daily allowance of carbs to eat at one meal.  · Proteins have very little or no carbs per serving. Examples of proteins are beef, chicken, turkey, fish, eggs, tofu, cheese, cottage cheese, and peanut butter. A serving size of meat is 3 ounces, which is about the size of a deck of cards. Examples of meat substitute serving sizes (equal to 1 ounce of meat) are 1/4 cup of cottage cheese, 1 egg, 1 tablespoon of peanut butter, and ½ cup of tofu. How can you eat out and still eat healthy? · Learn to estimate the serving sizes of foods that have carbohydrate. If you measure food at home, it will be easier to estimate the amount in a serving of restaurant food. · If the meal you order has too much carbohydrate (such as potatoes, corn, or baked beans), ask to have a low-carbohydrate food instead. Ask for a salad or green vegetables. · If you use insulin, check your blood sugar before and after eating out to help you plan how much to eat in the future.   · If you eat more carbohydrate at a meal than you had planned, take a walk or do other exercise. This will help lower your blood sugar. What else should you know? · Limit saturated fat, such as the fat from meat and dairy products. This is a healthy choice because people who have diabetes are at higher risk of heart disease. So choose lean cuts of meat and nonfat or low-fat dairy products. Use olive or canola oil instead of butter or shortening when cooking. · Don't skip meals. Your blood sugar may drop too low if you skip meals and take insulin or certain medicines for diabetes. · Check with your doctor before you drink alcohol. Alcohol can cause your blood sugar to drop too low. Alcohol can also cause a bad reaction if you take certain diabetes medicines. Follow-up care is a key part of your treatment and safety. Be sure to make and go to all appointments, and call your doctor if you are having problems. It's also a good idea to know your test results and keep a list of the medicines you take. Where can you learn more? Go to http://lis-barry.info/. Enter Q407 in the search box to learn more about \"Learning About Diabetes Food Guidelines. \"  Current as of: March 13, 2017  Content Version: 11.3  © 1652-9874 [employer]. Care instructions adapted under license by [employer] (which disclaims liability or warranty for this information). If you have questions about a medical condition or this instruction, always ask your healthcare professional. [place] [address] any warranty or liability for your use of this information. Learning About Meal Planning for Diabetes  Why plan your meals? Meal planning can be a key part of managing diabetes. Planning meals and snacks with the right balance of carbohydrate, protein, and fat can help you keep your blood sugar at the target level you set with your doctor. You don't have to eat special foods. You can eat what your family eats, including sweets once in a while.  But you do have to pay attention to how often you eat and how much you eat of certain foods. You may want to work with a dietitian or a certified diabetes educator. He or she can give you tips and meal ideas and can answer your questions about meal planning. This health professional can also help you reach a healthy weight if that is one of your goals. What plan is right for you? Your dietitian or diabetes educator may suggest that you start with the plate format or carbohydrate counting. The plate format  The plate format is a simple way to help you manage how you eat. You plan meals by learning how much space each food should take on a plate. Using the plate format helps you spread carbohydrate throughout the day. It can make it easier to keep your blood sugar level within your target range. It also helps you see if you're eating healthy portion sizes. To use the plate format, you put non-starchy vegetables on half your plate. Add meat or meat substitutes on one-quarter of the plate. Put a grain or starchy vegetable (such as brown rice or a potato) on the final quarter of the plate. You can add a small piece of fruit and some low-fat or fat-free milk or yogurt, depending on your carbohydrate goal for each meal.  Here are some tips for using the plate format:  · Make sure that you are not using an oversized plate. A 9-inch plate is best. Many restaurants use larger plates. · Get used to using the plate format at home. Then you can use it when you eat out. · Write down your questions about using the plate format. Talk to your doctor, a dietitian, or a diabetes educator about your concerns. Carbohydrate counting  With carbohydrate counting, you plan meals based on the amount of carbohydrate in each food. Carbohydrate raises blood sugar higher and more quickly than any other nutrient. It is found in desserts, breads and cereals, and fruit.  It's also found in starchy vegetables such as potatoes and corn, grains such as rice and pasta, and milk and yogurt. Spreading carbohydrate throughout the day helps keep your blood sugar levels within your target range. Your daily amount depends on several things, including your weight, how active you are, which diabetes medicines you take, and what your goals are for your blood sugar levels. A registered dietitian or diabetes educator can help you plan how much carbohydrate to include in each meal and snack. A guideline for your daily amount of carbohydrate is:  · 45 to 60 grams at each meal. That's about the same as 3 to 4 carbohydrate servings. · 15 to 20 grams at each snack. That's about the same as 1 carbohydrate serving. The Nutrition Facts label on packaged foods tells you how much carbohydrate is in a serving of the food. First, look at the serving size on the food label. Is that the amount you eat in a serving? All of the nutrition information on a food label is based on that serving size. So if you eat more or less than that, you'll need to adjust the other numbers. Total carbohydrate is the next thing you need to look for on the label. If you count carbohydrate servings, one serving of carbohydrate is 15 grams. For foods that don't come with labels, such as fresh fruits and vegetables, you'll need a guide that lists carbohydrate in these foods. Ask your doctor, dietitian, or diabetes educator about books or other nutrition guides you can use. If you take insulin, you need to know how many grams of carbohydrate are in a meal. This lets you know how much rapid-acting insulin to take before you eat. If you use an insulin pump, you get a constant rate of insulin during the day. So the pump must be programmed at meals to give you extra insulin to cover the rise in blood sugar after meals. When you know how much carbohydrate you will eat, you can take the right amount of insulin.  Or, if you always use the same amount of insulin, you need to make sure that you eat the same amount of carbohydrate at meals. If you need more help to understand carbohydrate counting and food labels, ask your doctor, dietitian, or diabetes educator. How do you get started with meal planning? Here are some tips to get started:  · Plan your meals a week at a time. Don't forget to include snacks too. · Use cookbooks or online recipes to plan several main meals. Plan some quick meals for busy nights. You also can double some recipes that freeze well. Then you can save half for other busy nights when you don't have time to cook. · Make sure you have the ingredients you need for your recipes. If you're running low on basic items, put these items on your shopping list too. · List foods that you use to make breakfasts, lunches, and snacks. List plenty of fruits and vegetables. · Post this list on the refrigerator. Add to it as you think of more things you need. · Take the list to the store to do your weekly shopping. Follow-up care is a key part of your treatment and safety. Be sure to make and go to all appointments, and call your doctor if you are having problems. It's also a good idea to know your test results and keep a list of the medicines you take. Where can you learn more? Go to http://lis-barry.info/. Ladonna Krishna in the search box to learn more about \"Learning About Meal Planning for Diabetes. \"  Current as of: March 13, 2017  Content Version: 11.3  © 0119-8739 [employer]. Care instructions adapted under license by [employer] (which disclaims liability or warranty for this information). If you have questions about a medical condition or this instruction, always ask your healthcare professional. [place] [id number] any warranty or liability for your use of this information.

## 2017-07-21 NOTE — MR AVS SNAPSHOT
Visit Information Date & Time Provider Department Dept. Phone Encounter #  
 7/21/2017  9:00 AM David Brady MD [facility] [employer] Payment [phone] [id number] Follow-up Instructions Return in about 3 months (around 10/21/2017), or if symptoms worsen or fail to improve, for HTN, DM, Obesity, HLD. Your Appointments 9/13/2017  9:15 AM  
ESTABLISHED PATIENT with Radha Clemente MD  
[facility]) Appt Note: 6 months [address] [facility], [address] [place] [id number]  
[address] [facility], [address] [place] [address] Upcoming Health Maintenance Date Due  
 FOOT EXAM Q1 12/24/1963 MICROALBUMIN Q1 12/24/1963 EYE EXAM RETINAL OR DILATED Q1 12/24/1963 Pneumococcal 19-64 Highest Risk (1 of 3 - PCV13) 12/24/1972 DTaP/Tdap/Td series (1 - Tdap) 12/24/1974 FOBT Q 1 YEAR AGE 50-75 12/24/2003 ZOSTER VACCINE AGE 60> 10/24/2013 PAP AKA CERVICAL CYTOLOGY 10/24/2014 LIPID PANEL Q1 10/12/2016 INFLUENZA AGE 9 TO ADULT 8/1/2017 HEMOGLOBIN A1C Q6M 10/28/2017 BREAST CANCER SCRN MAMMOGRAM 3/14/2019 Allergies as of 7/21/2017  Review Complete On: 7/20/2017 By: Solange Colon RN Severity Noted Reaction Type Reactions Pcn [Penicillins]  01/16/2013    Rash Current Immunizations  Never Reviewed No immunizations on file. Not reviewed this visit You Were Diagnosed With   
  
 Codes Comments Type 2 diabetes mellitus without complication, without long-term current use of insulin (HCC)    -  Primary ICD-10-CM: E11.9 ICD-9-CM: 250.00 Malignant neoplasm of upper-outer quadrant of right female breast ([address]     ICD-10-CM: C50.411 ICD-9-CM: 174.4 Benign hypertensive heart disease without heart failure     ICD-10-CM: I11.9 ICD-9-CM: 402.10  Morbid obesity due to excess calories (HCC)     ICD-10-CM: E66.01 
ICD-9-CM: 278.01   
 Hyperlipidemia, unspecified hyperlipidemia type     ICD-10-CM: E78.5 ICD-9-CM: 272.4 Vitals BP Pulse Temp Resp Height(growth percentile) Weight(growth percentile) 127/80 (BP 1 Location: Left arm) 83 97.5 °F (36.4 °C) (Oral) 18 5' 3\" (1.6 m) 257 lb 6.4 oz (116.8 kg) LMP SpO2 BMI OB Status Smoking Status 05/01/1993 96% 45.6 kg/m2 Postmenopausal Former Smoker BMI and BSA Data Body Mass Index Body Surface Area  
 45.6 kg/m 2 2.28 m 2 Preferred Pharmacy Pharmacy Name Phone [id number] - [place], [address] [place] [address]. [phone] Your Updated Medication List  
  
   
This list is accurate as of: 7/21/17  9:53 AM.  Always use your most recent med list.  
  
  
  
  
 citalopram 20 mg tablet Commonly known as:  Bailey Brett Take 1 Tab by mouth daily. cyclobenzaprine 10 mg tablet Commonly known as:  FLEXERIL Take 1 Tab by mouth nightly. Indications: MUSCLE SPASM  
  
 furosemide 40 mg tablet Commonly known as:  LASIX Take 1 Tab by mouth two (2) times a day. GARCINIA CAMBOGIA PO Take  by mouth. Indications: 2 tablets daily  
  
 isosorbide mononitrate ER 30 mg tablet Commonly known as:  IMDUR  
TAKE 1 TABLET BY MOUTH EVERY MORNING. Lactobacillus Acidoph & Bulgar 1 million cell Tab tablet Commonly known as:  Lorain Sheller Take 2 Tabs by mouth two (2) times a day. metoprolol tartrate 50 mg tablet Commonly known as:  LOPRESSOR Take 1 Tab by mouth two (2) times a day. MULTI VITAMIN PO Take  by mouth. spironolactone 50 mg tablet Commonly known as:  ALDACTONE Take 1 Tab by mouth two (2) times a day. VITAMIN D3 2,000 unit Tab Generic drug:  cholecalciferol (vitamin D3) Take  by mouth. We Performed the Following AMB EXT LDL-C [TEZ33033 CPT(R)] Comments: This external order was created through the Results Console. AMB POC HEMOGLOBIN A1C [52644 CPT(R)] AMB POC URINE, MICROALBUMIN, SEMIQUANTITATIVE [81542 CPT(R)] HM DIABETES FOOT EXAM [HM7 Custom] Follow-up Instructions Return in about 3 months (around 10/21/2017), or if symptoms worsen or fail to improve, for HTN, DM, Obesity, HLD. To-Do List   
 09/18/2017 9:00 AM  
  Appointment with KAROL BOLAÑOS at [address] [place] [address] ([phone]) PAYMENT  For Non-Medicare patients - $15.00 will be collected from you at the time of your exam.  You will be billed $35.00 from the reading [facility]. OUTSIDE FILMS  - Any outside films related to the study being scheduled should be brought with you on the day of the exam.  If this cannot be done there may be a delay in the reading of the study. MEDICATIONS  - Patient must bring a complete list of all medications currently taking to include prescriptions, over-the-counter meds, herbals, vitamins & any dietary supplements GENERAL INSTRUCTIONS  - On the day of your exam do not use any bath powder, deodorant or lotions on the armpit area. Patient Instructions Body Mass Index: Care Instructions Your Care Instructions Body mass index (BMI) can help you see if your weight is raising your risk for health problems. It uses a formula to compare how much you weigh with how tall you are. · A BMI lower than 18.5 is considered underweight. · A BMI between 18.5 and 24.9 is considered healthy. · A BMI between 25 and 29.9 is considered overweight. A BMI of 30 or higher is considered obese. If your BMI is in the normal range, it means that you have a lower risk for weight-related health problems. If your BMI is in the overweight or obese range, you may be at increased risk for weight-related health problems, such as high blood pressure, heart disease, stroke, arthritis or joint pain, and diabetes.  If your BMI is in the underweight range, you may be at increased risk for health problems such as fatigue, lower protection (immunity) against illness, muscle loss, bone loss, hair loss, and hormone problems. BMI is just one measure of your risk for weight-related health problems. You may be at higher risk for health problems if you are not active, you eat an unhealthy diet, or you drink too much alcohol or use tobacco products. Follow-up care is a key part of your treatment and safety. Be sure to make and go to all appointments, and call your doctor if you are having problems. It's also a good idea to know your test results and keep a list of the medicines you take. How can you care for yourself at home? · Practice healthy eating habits. This includes eating plenty of fruits, vegetables, whole grains, lean protein, and low-fat dairy. · If your doctor recommends it, get more exercise. Walking is a good choice. Bit by bit, increase the amount you walk every day. Try for at least 30 minutes on most days of the week. · Do not smoke. Smoking can increase your risk for health problems. If you need help quitting, talk to your doctor about stop-smoking programs and medicines. These can increase your chances of quitting for good. · Limit alcohol to 2 drinks a day for men and 1 drink a day for women. Too much alcohol can cause health problems. If you have a BMI higher than 25 · Your doctor may do other tests to check your risk for weight-related health problems. This may include measuring the distance around your waist. A waist measurement of more than 40 inches in men or 35 inches in women can increase the risk of weight-related health problems. · Talk with your doctor about steps you can take to stay healthy or improve your health. You may need to make lifestyle changes to lose weight and stay healthy, such as changing your diet and getting regular exercise. If you have a BMI lower than 18.5 · Your doctor may do other tests to check your risk for health problems. · Talk with your doctor about steps you can take to stay healthy or improve your health. You may need to make lifestyle changes to gain or maintain weight and stay healthy, such as getting more healthy foods in your diet and doing exercises to build muscle. Where can you learn more? Go to http://ils-barry.info/. Enter S176 in the search box to learn more about \"Body Mass Index: Care Instructions. \" Current as of: January 23, 2017 Content Version: 11.3 © 2658-4409 Rockerbox. Care instructions adapted under license by [employer] (which disclaims liability or warranty for this information). If you have questions about a medical condition or this instruction, always ask your healthcare professional. [address] any warranty or liability for your use of this information. Noninsulin Medicines for Type 2 Diabetes: Care Instructions Your Care Instructions There are different types of noninsulin medicines for diabetes. Each works in a different way. But they all help you control your blood sugar. Some types help your body make insulin to lower your blood sugar. Others lower how much insulin your body needs. Some can slow how fast your body digests sugars. And some can remove extra glucose through your urine. · Alpha-glucosidase inhibitors. These keep starches from breaking down. This means that they lower the amount of glucose absorbed when you eat. They don't help your body make more insulin. So they will not cause low blood sugar unless you use them with other medicines for diabetes. They include acarbose and miglitol. · DPP-4 inhibitors. These help your body raise the level of insulin after you eat. They also help your body make less of a hormone that raises blood sugar. They include linagliptin, saxagliptin, and sitagliptin. · Incretin hormones (GLP-1 receptor agonists). Your body makes a protein that can raise your insulin level. It also can lower your blood sugar and make you less hungry. You can get shots of hormones that work the same way. They include exenatide and liraglutide. · Meglitinides. These help your body release insulin. They also help slow how your body digests sugars. So they can keep your blood sugar from rising too fast after you eat. They include nateglinide and repaglinide. · Metformin. This lowers how much glucose your liver makes. And it helps you respond better to insulin. It also lowers the amount of stored sugar that your liver releases when you are not eating. · SGLT2 inhibitors. These help to remove extra glucose through your urine. They may also help some people lose weight. They include canagliflozin, dapagliflozin, and empagliflozin. · Sulfonylureas. These help your body release more insulin. Some work for many hours. They can cause low blood sugar if you don't eat as you planned. They include glipizide and glyburide. · Thiazolidinediones. These reduce the amount of blood glucose. They also help you respond better to insulin. They include pioglitazone and rosiglitazone. You may need to take more than one medicine for diabetes. Two or more medicines may work better to lower your blood sugar level than just one does. Follow-up care is a key part of your treatment and safety. Be sure to make and go to all appointments, and call your doctor if you are having problems. It's also a good idea to know your test results and keep a list of the medicines you take. How can you care for yourself at home? · Eat a healthy diet. Get some exercise each day. This may help you to reduce how much medicine you need.  
· Do not take other prescription or over-the-counter medicines, vitamins, herbal products, or supplements without talking to your doctor first. Some medicines for type 2 diabetes can cause problems with other medicines or supplements. · Tell your doctor if you plan to get pregnant. Some of these drugs are not safe for pregnant women. · Be safe with medicines. Take your medicines exactly as prescribed. Meglitinides and sulfonylureas can cause your blood sugar to drop very low. Call your doctor if you think you are having a problem with your medicine. · Check your blood sugar often. You can use a glucose monitor. Keeping track can help you know how certain foods, activities, and medicines affect your blood sugar. And it can help you keep your blood sugar from getting so low that it's not safe. When should you call for help? Call 911 anytime you think you may need emergency care. For example, call if: 
· You passed out (lost consciousness). · You are confused or cannot think clearly. · Your blood sugar is very high or very low. Watch closely for changes in your health, and be sure to contact your doctor if: 
· Your blood sugar stays outside the level your doctor set for you. · You have any problems. Where can you learn more? Go to http://lis-barry.info/. Enter H153 in the search box to learn more about \"Noninsulin Medicines for Type 2 Diabetes: Care Instructions. \" Current as of: March 13, 2017 Content Version: 11.3 © 9180-1620 WorldState. Care instructions adapted under license by [employer] (which disclaims liability or warranty for this information). If you have questions about a medical condition or this instruction, always ask your healthcare professional. [place] [address] any warranty or liability for your use of this information. Learning About Diabetes Food Guidelines Your Care Instructions Meal planning is important to manage diabetes. It helps keep your blood sugar at a target level (which you set with your doctor).  You don't have to eat special foods. You can eat what your family eats, including sweets once in a while. But you do have to pay attention to how often you eat and how much you eat of certain foods. You may want to work with a dietitian or a certified diabetes educator (CDE) to help you plan meals and snacks. A dietitian or CDE can also help you lose weight if that is one of your goals. What should you know about eating carbs? Managing the amount of carbohydrate (carbs) you eat is an important part of healthy meals when you have diabetes. Carbohydrate is found in many foods. · Learn which foods have carbs. And learn the amounts of carbs in different foods. ¨ Bread, cereal, pasta, and rice have about 15 grams of carbs in a serving. A serving is 1 slice of bread (1 ounce), ½ cup of cooked cereal, or 1/3 cup of cooked pasta or rice. ¨ Fruits have 15 grams of carbs in a serving. A serving is 1 small fresh fruit, such as an apple or orange; ½ of a banana; ½ cup of cooked or canned fruit; ½ cup of fruit juice; 1 cup of melon or raspberries; or 2 tablespoons of dried fruit. ¨ Milk and no-sugar-added yogurt have 15 grams of carbs in a serving. A serving is 1 cup of milk or 2/3 cup of no-sugar-added yogurt. ¨ Starchy vegetables have 15 grams of carbs in a serving. A serving is ½ cup of mashed potatoes or sweet potato; 1 cup winter squash; ½ of a small baked potato; ½ cup of cooked beans; or ½ cup cooked corn or green peas. · Learn how much carbs to eat each day and at each meal. A dietitian or CDE can teach you how to keep track of the amount of carbs you eat. This is called carbohydrate counting. · If you are not sure how to count carbohydrate grams, use the Plate Method to plan meals. It is a good, quick way to make sure that you have a balanced meal. It also helps you spread carbs throughout the day. ¨ Divide your plate by types of foods.  Put non-starchy vegetables on half the plate, meat or other protein food on one-quarter of the plate, and a grain or starchy vegetable in the final quarter of the plate. To this you can add a small piece of fruit and 1 cup of milk or yogurt, depending on how many carbs you are supposed to eat at a meal. 
· Try to eat about the same amount of carbs at each meal. Do not \"save up\" your daily allowance of carbs to eat at one meal. 
· Proteins have very little or no carbs per serving. Examples of proteins are beef, chicken, turkey, fish, eggs, tofu, cheese, cottage cheese, and peanut butter. A serving size of meat is 3 ounces, which is about the size of a deck of cards. Examples of meat substitute serving sizes (equal to 1 ounce of meat) are 1/4 cup of cottage cheese, 1 egg, 1 tablespoon of peanut butter, and ½ cup of tofu. How can you eat out and still eat healthy? · Learn to estimate the serving sizes of foods that have carbohydrate. If you measure food at home, it will be easier to estimate the amount in a serving of restaurant food. · If the meal you order has too much carbohydrate (such as potatoes, corn, or baked beans), ask to have a low-carbohydrate food instead. Ask for a salad or green vegetables. · If you use insulin, check your blood sugar before and after eating out to help you plan how much to eat in the future. · If you eat more carbohydrate at a meal than you had planned, take a walk or do other exercise. This will help lower your blood sugar. What else should you know? · Limit saturated fat, such as the fat from meat and dairy products. This is a healthy choice because people who have diabetes are at higher risk of heart disease. So choose lean cuts of meat and nonfat or low-fat dairy products. Use olive or canola oil instead of butter or shortening when cooking. · Don't skip meals. Your blood sugar may drop too low if you skip meals and take insulin or certain medicines for diabetes. · Check with your doctor before you drink alcohol. Alcohol can cause your blood sugar to drop too low. Alcohol can also cause a bad reaction if you take certain diabetes medicines. Follow-up care is a key part of your treatment and safety. Be sure to make and go to all appointments, and call your doctor if you are having problems. It's also a good idea to know your test results and keep a list of the medicines you take. Where can you learn more? Go to http://lis-barry.info/. Enter O186 in the search box to learn more about \"Learning About Diabetes Food Guidelines. \" Current as of: March 13, 2017 Content Version: 11.3 © 7889-9382 Synoptos Inc.. Care instructions adapted under license by Scimetrika (which disclaims liability or warranty for this information). If you have questions about a medical condition or this instruction, always ask your healthcare professional. [place] [address] any warranty or liability for your use of this information. Learning About Meal Planning for Diabetes Why plan your meals? Meal planning can be a key part of managing diabetes. Planning meals and snacks with the right balance of carbohydrate, protein, and fat can help you keep your blood sugar at the target level you set with your doctor. You don't have to eat special foods. You can eat what your family eats, including sweets once in a while. But you do have to pay attention to how often you eat and how much you eat of certain foods. You may want to work with a dietitian or a certified diabetes educator. He or she can give you tips and meal ideas and can answer your questions about meal planning. This health professional can also help you reach a healthy weight if that is one of your goals. What plan is right for you? Your dietitian or diabetes educator may suggest that you start with the plate format or carbohydrate counting. The plate format The plate format is a simple way to help you manage how you eat. You plan meals by learning how much space each food should take on a plate. Using the plate format helps you spread carbohydrate throughout the day. It can make it easier to keep your blood sugar level within your target range. It also helps you see if you're eating healthy portion sizes. To use the plate format, you put non-starchy vegetables on half your plate. Add meat or meat substitutes on one-quarter of the plate. Put a grain or starchy vegetable (such as brown rice or a potato) on the final quarter of the plate. You can add a small piece of fruit and some low-fat or fat-free milk or yogurt, depending on your carbohydrate goal for each meal. 
Here are some tips for using the plate format: · Make sure that you are not using an oversized plate. A 9-inch plate is best. Many restaurants use larger plates. · Get used to using the plate format at home. Then you can use it when you eat out. · Write down your questions about using the plate format. Talk to your doctor, a dietitian, or a diabetes educator about your concerns. Carbohydrate counting With carbohydrate counting, you plan meals based on the amount of carbohydrate in each food. Carbohydrate raises blood sugar higher and more quickly than any other nutrient. It is found in desserts, breads and cereals, and fruit. It's also found in starchy vegetables such as potatoes and corn, grains such as rice and pasta, and milk and yogurt. Spreading carbohydrate throughout the day helps keep your blood sugar levels within your target range. Your daily amount depends on several things, including your weight, how active you are, which diabetes medicines you take, and what your goals are for your blood sugar levels. A registered dietitian or diabetes educator can help you plan how much carbohydrate to include in each meal and snack. A guideline for your daily amount of carbohydrate is: · 45 to 60 grams at each meal. That's about the same as 3 to 4 carbohydrate servings. · 15 to 20 grams at each snack. That's about the same as 1 carbohydrate serving. The Nutrition Facts label on packaged foods tells you how much carbohydrate is in a serving of the food. First, look at the serving size on the food label. Is that the amount you eat in a serving? All of the nutrition information on a food label is based on that serving size. So if you eat more or less than that, you'll need to adjust the other numbers. Total carbohydrate is the next thing you need to look for on the label. If you count carbohydrate servings, one serving of carbohydrate is 15 grams. For foods that don't come with labels, such as fresh fruits and vegetables, you'll need a guide that lists carbohydrate in these foods. Ask your doctor, dietitian, or diabetes educator about books or other nutrition guides you can use. If you take insulin, you need to know how many grams of carbohydrate are in a meal. This lets you know how much rapid-acting insulin to take before you eat. If you use an insulin pump, you get a constant rate of insulin during the day. So the pump must be programmed at meals to give you extra insulin to cover the rise in blood sugar after meals. When you know how much carbohydrate you will eat, you can take the right amount of insulin. Or, if you always use the same amount of insulin, you need to make sure that you eat the same amount of carbohydrate at meals. If you need more help to understand carbohydrate counting and food labels, ask your doctor, dietitian, or diabetes educator. How do you get started with meal planning? Here are some tips to get started: 
· Plan your meals a week at a time. Don't forget to include snacks too. · Use cookbooks or online recipes to plan several main meals. Plan some quick meals for busy nights.  You also can double some recipes that freeze well. Then you can save half for other busy nights when you don't have time to cook. · Make sure you have the ingredients you need for your recipes. If you're running low on basic items, put these items on your shopping list too. · List foods that you use to make breakfasts, lunches, and snacks. List plenty of fruits and vegetables. · Post this list on the refrigerator. Add to it as you think of more things you need. · Take the list to the store to do your weekly shopping. Follow-up care is a key part of your treatment and safety. Be sure to make and go to all appointments, and call your doctor if you are having problems. It's also a good idea to know your test results and keep a list of the medicines you take. Where can you learn more? Go to http://lis-barry.info/. Mitchell Haskins in the search box to learn more about \"Learning About Meal Planning for Diabetes. \" Current as of: March 13, 2017 Content Version: 11.3 © 0597-8844 [employer]Gift. Care instructions adapted under license by [employer] (which disclaims liability or warranty for this information). If you have questions about a medical condition or this instruction, always ask your healthcare professional. [address] any warranty or liability for your use of this information. Introducing [facility]! Dear Jaxson Weinstein: 
Thank you for requesting a Ten[employer] account. Our records indicate that you already have an active Tenrox account. You can access your account anytime at [email or web address] Did you know that you can access your hospital and ER discharge instructions at any time in Tenrox? You can also review all of your test results from your hospital stay or ER visit. Additional Information If you have questions, please visit the Frequently Asked Questions section of the [employer] website at [email or web address] Remember, MyChart is NOT to be used for urgent needs. For medical emergencies, dial 911. Now available from your iPhone and Android! Please provide this summary of care documentation to your next provider. Your primary care clinician is listed as Racquel Carson. If you have any questions after today's visit, please call [phone].

## 2017-07-21 NOTE — PROGRESS NOTES
Chief Complaint   Patient presents with    Diabetes    Hypertension     HPI:  HTN, Cardiomyopathy follows Dr Kyle Sanabria and has been doing well, stable and complaint with medications. Denies HA, blurry vision, chest pain, sob, focal deficits. Obesity  VIRGINIE on CPAP followed by Dr Odessa Donald Dr [place] Osler sees patient for Pap smear  Dr Alonzo Cockayne did colonoscopy 2 years ago. Due back in 2018. Hx of breast cancer s/p mastectomy following Dr Yisel Darden (gen surg) and [place] (Oncology). She tells me the cancer has spread and is now in her bones and liver per pt. She recently had a CT and bone scan. She is due for mediport placement and Chemotherapy. She c/o of 8/10 abdominal pain. +constipation. Denies n,v,f,c,brbpr, melena. Depression: stable, Denies SI and HI  New diagnosis of DM: checks BS although She doesn't know how to use lancet device so she pricks finger with the lancet alone. BS no higher than 100s fasting. Denies polyuria, polydipsia or polyphagia.     Patient Active Problem List   Diagnosis Code    Retinal detachment H33.20    Angina decubitus (HCC) I20.8    Benign hypertensive heart disease without heart failure I11.9    Cardiomegaly I51.7    Abnormal nuclear cardiac imaging test R93.1    Obesity E66.9    VIRGINIE on CPAP G47.33, Z99.89    Edema R60.9    Personal history of malignant neoplasm of breast Z85.3    Malignant neoplasm of female breast ([facility] [address] C50.919    Diabetes ([address] E11.9    Hyperlipidemia E78.5       Review of Systems   Complete ROS negative except where noted in HPI    Objective:     Visit Vitals    /80 (BP 1 Location: Left arm)    Pulse 83    Temp 97.5 °F (36.4 °C) (Oral)    Resp 18    Ht 5' 3\" (1.6 m)    Wt 257 lb 6.4 oz (116.8 kg)    LMP 05/01/1993    SpO2 96%    BMI 45.6 kg/m2     No exam data present    Constitutional: The patient appears well, NAD, Alert & Oriented x 3  Psych: Normal Mood, Normal Behavior  ENT: IRLANAD, EOMI, no scleral icterus  Lungs: CTAB,  Normal effort , Good air entry, No W/R/R   Cardiovascular:RRR, S1 and S2 normal  GI: ttp, +bs, distended no guarding or rebound  Extremities: trace LE edema, feet: warm, good capillary refill    Jaclyn was seen today for diabetes and hypertension. Diagnoses and all orders for this visit:    Type 2 diabetes mellitus without complication, without long-term current use of insulin (HCC)  -     AMB POC URINE, MICROALBUMIN, SEMIQUANTITATIVE  -     HM DIABETES FOOT EXAM    Malignant neoplasm of upper-outer quadrant of right female breast (HCC)    Benign hypertensive heart disease without heart failure    Morbid obesity due to excess calories (HCC)    Hyperlipidemia, unspecified hyperlipidemia type    Constipation, unspecified constipation type  -     docusate sodium (COLACE) 100 mg capsule; Take 1 Cap by mouth two (2) times a day for 90 days. I have called over to Dr Heidi Alves office to see if it is possible for staff to evaluate patient for her abdominal pain. Recheck A1C at next visit. I have discussed the diagnosis with the patient and the intended plan as seen in the above orders. The patient has received an after-visit summary and questions were answered concerning future plans. I have discussed medication side effects and warnings with the patient as well. I have reviewed the plan of care with the patient, accepted their input and they are in agreement with the treatment goals. Patient verbalizes understanding. Follow-up Disposition:  Return in about 3 months (around 10/21/2017), or if symptoms worsen or fail to improve, for HTN, DM, Obesity, HLD.

## 2017-07-24 PROBLEM — C50.919 METASTATIC BREAST CANCER (HCC): Status: ACTIVE | Noted: 2017-01-01

## 2017-07-24 NOTE — IP AVS SNAPSHOT
[address] [address] 
[phone] Patient: Malia Mason MRN: [id number] :1955 You are allergic to the following Allergen Reactions Pcn (Penicillins) Rash Recent Documentation Height Weight BMI OB Status Smoking Status 1.6 m 111.6 kg 43.58 kg/m2 Postmenopausal Former Smoker Emergency Contacts Name Discharge Info Relation Home Work Mobile Lissa Chua DISCHARGE CAREGIVER [3] Mother [14] [phone] Lissa Chua  Parent [1] [phone] About your hospitalization You were admitted on:  2017 You last received care in the:  [facility] PACU You were discharged on:  2017 Unit phone number:  [phone] Why you were hospitalized Your primary diagnosis was:  Not on File Your diagnoses also included:  Metastatic Breast Cancer (Hcc) Providers Seen During Your Hospitalizations Provider Role Specialty Primary office phone Fortunato Sebastian MD Attending Provider General Surgery [phone] Your Primary Care Physician (PCP) Primary Care Physician Office Phone Office Fax Don Castro [phone] Follow-up Information Follow up With Details Comments Contact Info Renee Leavitt MD   [address] [place] [address] [address] [place] [address] 
[phone] Fortunato Sebastian MD Follow up in 2 week(s)  [address] [facility] [address] [facility] 
[phone] Current Discharge Medication List  
  
START taking these medications Dose & Instructions Dispensing Information Comments Morning Noon Evening Bedtime HYDROcodone-acetaminophen 5-325 mg per tablet Commonly known as:  Elia Casarez Your last dose was: Your next dose is:    
   
   
 1-2 tabs every 4-6 hours as needed for pain Quantity:  30 Tab Refills:  0 CONTINUE these medications which have NOT CHANGED Dose & Instructions Dispensing Information Comments Morning Noon Evening Bedtime  
 citalopram 20 mg tablet Commonly known as:  Amairani Aguayo Your last dose was: Your next dose is:    
   
   
 Dose:  20 mg Take 1 Tab by mouth daily. Quantity:  30 Tab Refills:  3  
     
   
   
   
  
 cyclobenzaprine 10 mg tablet Commonly known as:  FLEXERIL Your last dose was: Your next dose is:    
   
   
 Dose:  10 mg Take 1 Tab by mouth nightly. Indications: MUSCLE SPASM Quantity:  30 Tab Refills:  0  
     
   
   
   
  
 docusate sodium 100 mg capsule Commonly known as:  Georgia Quintero Your last dose was: Your next dose is:    
   
   
 Dose:  100 mg Take 1 Cap by mouth two (2) times a day for 90 days. Quantity:  60 Cap Refills:  2  
     
   
   
   
  
 furosemide 40 mg tablet Commonly known as:  LASIX Your last dose was: Your next dose is:    
   
   
 Dose:  40 mg Take 1 Tab by mouth two (2) times a day. Quantity:  60 Tab Refills:  3 GARCINIA CAMBOGIA PO Your last dose was: Your next dose is: Take  by mouth. Indications: 2 tablets daily Refills:  0  
     
   
   
   
  
 isosorbide mononitrate ER 30 mg tablet Commonly known as:  IMDUR Your last dose was: Your next dose is: TAKE 1 TABLET BY MOUTH EVERY MORNING. Quantity:  30 Tab Refills:  5 Lactobacillus Acidoph & Bulgar 1 million cell Tab tablet Commonly known as:  Dodie Deluca Your last dose was: Your next dose is:    
   
   
 Dose:  2 Tab Take 2 Tabs by mouth two (2) times a day. Quantity:  120 Tab Refills:  3  
     
   
   
   
  
 metoprolol tartrate 50 mg tablet Commonly known as:  LOPRESSOR Your last dose was: Your next dose is: Dose:  50 mg Take 1 Tab by mouth two (2) times a day. Quantity:  60 Tab Refills:  6 MULTI VITAMIN PO Your last dose was: Your next dose is: Take  by mouth. Refills:  0  
     
   
   
   
  
 spironolactone 50 mg tablet Commonly known as:  ALDACTONE Your last dose was: Your next dose is:    
   
   
 Dose:  50 mg Take 1 Tab by mouth two (2) times a day. Quantity:  60 Tab Refills:  3 VITAMIN D3 2,000 unit Tab Generic drug:  cholecalciferol (vitamin D3) Your last dose was: Your next dose is: Take  by mouth. Refills:  0 Where to Get Your Medications Information on where to get these meds will be given to you by the nurse or doctor. ! Ask your nurse or doctor about these medications HYDROcodone-acetaminophen 5-325 mg per tablet Discharge Instructions [facility] Fabio Gray MD, FACS General Surgery Pt may shower. Allow soap and water to run over the incision. No driving or operating heavy machinery while on narcotic pain medications. No strenuous activity or contact sports for two weeks. No lifting greater than 15 lbs for 2 weeks. Call MD for any redness, swelling, bleeding or pus at the incision. Also call for any nausea, vomiting, increased pain or pain uncontrolled by pain medicine. Implanted Port: What to Expect at [facility] Your Recovery You have had a procedure to implant a port. The port looks like a small bump under your skin. A thin, flexible tube called a catheter runs under the skin from the port into a large vein. You may have the port for weeks, months, or longer. You will be able to get medicine, blood, nutrients, or other fluids with more comfort. The port can be used right away. You will probably have some discomfort and bruising at the port site.  This will go away in a few days. You may have strips of tape on the cut (incision) the doctor made, or the cut may have been closed with glue. It may be covered with a small bandage. This care sheet gives you a general idea about how long it will take for you to recover. But each person recovers at a different pace. Follow the steps below to feel better as quickly as possible. How can you care for yourself at home? Activity · Avoid arm and upper body movements that may pull on the catheter. These movements include heavy weight lifting and vigorous use of your arms. · You will probably need to take 1 day off from work and will be able to return to normal activities shortly after. This depends on the type of work you do, why you have the catheter, and how you feel. · Ask your doctor when you can drive again. Pay special attention when pulling your seat belt across your chest so it doesn't pull out the catheter. It's okay if the seat belt lays over the catheter. Medicines · Your doctor will tell you if and when you can restart your medicines. He or she will also give you instructions about taking any new medicines. · If you take blood thinners, such as warfarin (Coumadin), clopidogrel (Plavix), or aspirin, be sure to talk to your doctor. He or she will tell you if and when to start taking those medicines again. Make sure that you understand exactly what your doctor wants you to do. · Take pain medicines exactly as directed. ¨ If the doctor gave you a prescription medicine for pain, take it as prescribed. ¨ If you are not taking a prescription pain medicine, ask your doctor if you can take an over-the-counter medicine. · If you think your pain medicine is making you sick to your stomach: 
¨ Take your medicine after meals (unless your doctor has told you not to). ¨ Ask your doctor for a different pain medicine. Incision care · If you have a bandage, your doctor will tell you when you can remove it. After you remove the bandage, you may shower. Wash the area with soap and water and pat it dry. Don't use hydrogen peroxide or alcohol, which can slow healing. You may cover the area with a gauze bandage if it weeps or rubs against clothing. Change the bandage every day. · If you have strips of tape on the cut (incision) the doctor made, leave the tape on for a week or until it falls off. Other instructions · Always carry the medical alert card that your doctor gives you. It contains information about your port. It will tell health care workers you have a port in case you need emergency care. · Wear loose clothing over the port for the first 10 to 14 days. When getting dressed, be careful not to rub the port. Follow-up care is a key part of your treatment and safety. Be sure to make and go to all appointments, and call your doctor if you are having problems. It's also a good idea to know your test results and keep a list of the medicines you take. When should you call for help? Call 911 anytime you think you may need emergency care. For example, call if: 
· You passed out (lost consciousness). · You have severe trouble breathing. · You have sudden chest pain and shortness of breath, or you cough up blood. Call your doctor now or seek immediate medical care if: 
· You have signs of infection, such as: 
¨ Increased pain, swelling, warmth, or redness near the port. ¨ Red streaks leading from the port. ¨ Pus draining from the port. ¨ A fever. · You have pain or swelling in your neck or arm. · You have trouble breathing or chest pain. Watch closely for changes in your health, and be sure to contact your doctor if: 
· You have any problems with your port. Where can you learn more? Go to http://lis-barry.info/. Enter M256 in the search box to learn more about \"Implanted Port: What to Expect at Home. \" Current as of: March 20, 2017 Content Version: 11.3 © 9680-3663 Healthwise, Peeractive. Care instructions adapted under license by [employer] (which disclaims liability or warranty for this information). If you have questions about a medical condition or this instruction, always ask your healthcare professional. [address] any warranty or liability for your use of this information. Breast Cancer: Care Instructions Your Care Instructions Breast cancer occurs when abnormal cells grow out of control in the breast. These cancer cells can spread within the breast, to nearby lymph nodes and other tissues, and to other parts of the body. Being treated for cancer can weaken your body, and you may feel very tired. Get the rest your body needs so you can feel better. Finding out that you have cancer is scary. You may feel many emotions and may need some help coping. Seek out family, friends, and counselors for support. You also can do things at home to make yourself feel better while you go through treatment. Call the [employer] (4-137.724.7888) or visit its website at [address] [employer] for more information. Follow-up care is a key part of your treatment and safety. Be sure to make and go to all appointments, and call your doctor if you are having problems. It's also a good idea to know your test results and keep a list of the medicines you take. How can you care for yourself at home? · Take your medicines exactly as prescribed. Call your doctor if you think you are having a problem with your medicine. You may get medicine for nausea and vomiting if you have these side effects. · Follow your doctor's instructions to relieve pain. Pain from cancer and surgery can almost always be controlled. Use pain medicine when you first notice pain, before it becomes severe. · Eat healthy food.  If you do not feel like eating, try to eat food that has protein and extra calories to keep up your strength and prevent weight loss. Drink liquid meal replacements for extra calories and protein. Try to eat your main meal early. · Get some physical activity every day, but do not get too tired. Keep doing the hobbies you enjoy as your energy allows. · Do not smoke. Smoking can make your cancer worse. If you need help quitting, talk to your doctor about stop-smoking programs and medicines. These can increase your chances of quitting for good. · Take steps to control your stress and workload. Learn relaxation techniques. ¨ Share your feelings. Stress and tension affect our emotions. By expressing your feelings to others, you may be able to understand and cope with them. ¨ Consider joining a support group. Talking about a problem with your spouse, a good friend, or other people with similar problems is a good way to reduce tension and stress. ¨ Express yourself through art. Try writing, crafts, dance, or art to relieve stress. Some dance, writing, or art groups may be available just for people who have cancer. ¨ Be kind to your body and mind. Getting enough sleep, eating a healthy diet, and taking time to do things you enjoy can contribute to an overall feeling of balance in your life and can help reduce stress. ¨ Get help if you need it. Discuss your concerns with your doctor or counselor. · If you are vomiting or have diarrhea: ¨ Drink plenty of fluids (enough so that your urine is light yellow or clear like water) to prevent dehydration. Choose water and other caffeine-free clear liquids. If you have kidney, heart, or liver disease and have to limit fluids, talk with your doctor before you increase the amount of fluids you drink. ¨ When you are able to eat, try clear soups, mild foods, and liquids until all symptoms are gone for 12 to 48 hours. Other good choices include dry toast, crackers, cooked cereal, and gelatin dessert, such as Jell-O. · If you have not already done so, prepare a list of advance directives. Advance directives are instructions to your doctor and family members about what kind of care you want if you become unable to speak or express yourself. When should you call for help? Call your doctor now or seek immediate medical care if: 
· You have a fever. · Any part of your breast becomes red, tender, swollen, or hot. · You have pain, redness, or swelling in the arm on the same side as your breast cancer. Watch closely for changes in your health, and be sure to contact your doctor if: 
· You have pain that is not controlled by medicine. · You have nausea or vomiting. · You are constipated or have diarrhea. Where can you learn more? Go to http://lis-barry.info/. Enter V321 in the search box to learn more about \"Breast Cancer: Care Instructions. \" Current as of: July 26, 2016 Content Version: 11.3 © 7656-6001 [employer]. Care instructions adapted under license by [employer] (which disclaims liability or warranty for this information). If you have questions about a medical condition or this instruction, always ask your healthcare professional. [place] [address] any warranty or liability for your use of this information. DISCHARGE SUMMARY from Nurse The following personal items are in your possession at time of discharge: 
 
Dental Appliances: None Visual Aid: None Home Medications: None Jewelry: None Clothing: Footwear, Pants, Shirt, Undergarments Other Valuables: None PATIENT INSTRUCTIONS: 
 
 
F-face looks uneven A-arms unable to move or move unevenly S-speech slurred or non-existent T-time-call 911 as soon as signs and symptoms begin-DO NOT go Back to bed or wait to see if you get better-TIME IS BRAIN. Warning Signs of HEART ATTACK Call 911 if you have these symptoms: 
? Chest discomfort.  Most heart attacks involve discomfort in the center of the chest that lasts more than a few minutes, or that goes away and comes back. It can feel like uncomfortable pressure, squeezing, fullness, or pain. ? Discomfort in other areas of the upper body. Symptoms can include pain or discomfort in one or both arms, the back, neck, jaw, or stomach. ? Shortness of breath with or without chest discomfort. ? Other signs may include breaking out in a cold sweat, nausea, or lightheadedness. Don't wait more than five minutes to call [address]! Fast action can save your life. Calling 911 is almost always the fastest way to get lifesaving treatment. Emergency Medical Services staff can begin treatment when they arrive  up to an hour sooner than if someone gets to the hospital by car. The discharge information has been reviewed with the patient and family. The patient and family verbalized understanding. Discharge medications reviewed with the patient and family and appropriate educational materials and side effects teaching were provided. Discharge Orders None [employer] Announcement We are excited to announce that we are making your provider's discharge notes available to you in [employer]. You will see these notes when they are completed and signed by the physician that discharged you from your recent hospital stay. If you have any questions or concerns about any information you see in [employer], please call the Health Information Department where you were seen or reach out to your Primary Care Provider for more information about your plan of care. Introducing [facility]! Dear Noy Antonio: 
Thank you for requesting a [employer] account. Our records indicate that you already have an active [employer] account. You can access your account anytime at [email or web address] Did you know that you can access your hospital and ER discharge instructions at any time in [employer]?   You can also review all of your test results from your hospital stay or ER visit. Additional Information If you have questions, please visit the Frequently Asked Questions section of the [employer] website at [email or web address] Remember, MyChart is NOT to be used for urgent needs. For medical emergencies, dial 911. Now available from your iPhone and Android! General Information Please provide this summary of care documentation to your next provider. Patient Signature:  ____________________________________________________________ Date:  ____________________________________________________________  
  
[place] [facility] Provider Signature:  ____________________________________________________________ Date:  ____________________________________________________________

## 2017-07-24 NOTE — H&P
[facility]  Office Progress Note     Patient: Stacie Jason                                    MRN: [id number]            SSN: [id number]   YOB: 1955                  Age: 59 y.o. Sex: female           Chief Complaint   Patient presents with    Advice Only       mediport         HPI    Ms. Lynn Ragland is a very pleasant 76-year-old AA female who is known to our practice related to personal history of right triple negative stage III breast carcinoma with Mediport placement and removal.  She is here today to discuss Mediport placement related to recent metastatic changes found on bone scan and liver CT. She is being followed by Dr. Lesia Veliz for medical oncology and sees her again next Tuesday. She is scheduled for brain MRI tomorrow. She understands her current diagnosis and the need for a new  Mediport.   We will schedule Mediport placement to the right chest wall for next Monday, July 24, 2017.          Past Medical History:   Diagnosis Date    Abnormal nuclear cardiac imaging test 9/22/2014     exertional chest tightness foe a few weeks progressively worse associated with sob     Arthritis      Asthma       Over 1 yr last problem with asthma had to use inhaler    Benign hypertensive heart disease without heart failure 9/22/2014     exertional chest tightness foe a few weeks progressively worse associated with sob     Breast cancer ([address] 2014     right    Cancer ([address] 12/5/14    Cancer ([address] 4-4-16     lymph nodes    Cardiomegaly 9/22/2014     exertional chest tightness foe a few weeks progressively worse associated with sob     Congestive heart failure, unspecified      Depression 01/1989    Diabetes ([address]       pt states she is not diabetic    GERD (gastroesophageal reflux disease)      GERD (gastroesophageal reflux disease)      Heart failure (HCC)      Hypertension      Intermediate coronary syndrome ([address] 9/22/2014     increasing chest tightness and sob     Irregular heart beat       Palpitations    Obesity, unspecified 9/22/2014     exertional chest tightness foe a few weeks progressively worse associated with sob     Other and unspecified angina pectoris 9/22/2014     exertional chest tightness foe a few weeks progressively worse associated with sob     Other and unspecified angina pectoris 9/22/2014     exertional chest tightness foe a few weeks progressively worse associated with sob     Sleep apnea       cpap    Unspecified sleep apnea       cpap            Past Surgical History:   Procedure Laterality Date    BIOPSY OF BREAST, INCISIONAL         LT BENIGN, 15 YRS. AGO    CARDIAC SURG PROCEDURE UNLIST        HX BREAST BIOPSY Left      HX BREAST BIOPSY Right 12/5/2014     RIGHT BREAST BIOPSY WITH NEEDLE LOCALIZATION ULTRASOUND TIMES TWO performed by Tamar Ham MD at [facility] MAIN OR    HX BREAST BIOPSY Right 2/11/2015     RIGHT BREAST EXPLORATION performed by Tamar Ham MD at [address] [place] [address] HX BUNIONECTOMY Right      HX CATARACT REMOVAL Bilateral      HX CATARACT REMOVAL        HX COLONOSCOPY        HX GYN         Laparoscopy    HX MASTECTOMY Right 2/4/2015     RIGHT PARTIAL MASTECTOMY/SENTINEL LYMPH NODE BIOPSY/POSSIBLE AXILLARY LYMPH NODE DISECTION performed by Tamar Ham MD at [address] [place] [address] HX MASTECTOMY Right 3/25/2016     RIGHT AXILLARY LYMPH NODE DISECTION/RIGHT PARTIAL MASTECTOMY performed by Tamar Ham MD at [address] [place] [address] HX ORTHOPAEDIC Right       bunionectomy    HX VASCULAR ACCESS   2016     mediport    [place] INSJ TUNNELED CTR VAD W/SUBQ PORT AGE 5 YR/>   5-4-16     Dr. Nathanael Hancock    [place] RMVL ROM CTR VAD W/SUBQ PORT/ CTR/PRPH INSJ N/A 05/01/2017     Dr. Rodríguez Severe           Allergies   Allergen Reactions    Pcn [Penicillins] Rash             Current Outpatient Prescriptions   Medication Sig Dispense Refill    ibuprofen (MOTRIN) 600 mg tablet Take 1 Tab by mouth every eight (8) hours as needed for Pain.  Indications: Pain 90 Tab 2    cyclobenzaprine (FLEXERIL) 10 mg tablet Take 1 Tab by mouth nightly. Indications: MUSCLE SPASM 30 Tab 0    Lactobacillus Acidoph & Bulgar (FLORANEX) 1 million cell tab tablet Take 2 Tabs by mouth two (2) times a day. 120 Tab 3    CHROM GUILHERME/BRINDAL BERRY (GARCINIA CAMBOGIA PO) Take  by mouth. Indications: 2 tablets daily        furosemide (LASIX) 40 mg tablet Take 1 Tab by mouth two (2) times a day. 60 Tab 3    citalopram (CELEXA) 20 mg tablet Take 1 Tab by mouth daily. 30 Tab 3    spironolactone (ALDACTONE) 50 mg tablet Take 1 Tab by mouth two (2) times a day. 60 Tab 3    MULTIVIT-MINERALS/FERROUS FUM (MULTI VITAMIN PO) Take  by mouth.        cholecalciferol, vitamin D3, (VITAMIN D3) 2,000 unit tab Take  by mouth.        isosorbide mononitrate ER (IMDUR) 30 mg tablet TAKE 1 TABLET BY MOUTH EVERY MORNING. 30 Tab 5    metoprolol tartrate (LOPRESSOR) 50 mg tablet Take 1 Tab by mouth two (2) times a day.  60 Tab 6    MULTIVITAMIN WITH MINERALS (HAIR,SKIN AND NAILS PO) Take  by mouth.          Social History            Social History    Marital status: SINGLE       Spouse name: N/A    Number of children: N/A    Years of education: N/A          Occupational History    Not on file.              Social History Main Topics    Smoking status: Former Smoker       Quit date: 9/22/1994    Smokeless tobacco: Never Used    Alcohol use Yes          Comment: occasionally    Drug use: No    Sexual activity: Not Currently       Partners: Male       Birth control/ protection: Surgical           Other Topics Concern    Not on file      Social History Narrative            Family History   Problem Relation Age of Onset    Arthritis-osteo Mother      Hypertension Mother      Arthritis-osteo Maternal Grandmother      Heart Disease Maternal Grandmother      Hypertension Maternal Grandmother      Arthritis-osteo Paternal Grandmother      Cancer Paternal Grandmother      Heart Disease Paternal Grandmother      Hypertension Paternal Grandmother      Arthritis-osteo Father      Cancer Father      Heart Disease Father      Hypertension Father      Lung Disease Father      Stroke Father      Cancer Paternal Grandfather      Lung Disease Paternal Grandfather      Stroke Paternal Grandfather      Hypertension Maternal Grandfather      Stroke Maternal Grandfather              Review of systems:  Patient denies any reflux, emesis, change in bowel habits, difficulties voiding, hematochezia, melena, fever, unintentional weight loss, fatigue, chills, palpitations, hypertension, edema, wheezing, dizziness, or fainting. Patient denies abdominal pain, new, sharp or differing headache, new or lingering cough,or new onset shortness of breath. Patient endorses epigastric pain described as sharp, nagging, and constant. She states his pain begins centrally and radiates around her right side to her right central back. She also endorses a 9 pound weight gain in 2 weeks with difficulty lying flat stating that she has trouble breathing.        Physical Examination          Visit Vitals    /78    Pulse 72    Temp 98.1 °F (36.7 °C)    Resp 18    Ht 5' 3\" (1.6 m)    Wt 121.1 kg (267 lb)    LMP 05/01/1993    BMI 47.3 kg/m2      Well developed well nourished female in no apparent distress  Head: normocephalic, atraumatic  Neck: supple, no masses, no adenopathy, trachea midline  Resp: clear to auscultation bilaterally, no wheezes, rhonchi or rales  Cardio: Regular rate and rhythm, no murmurs, no edema or varicosities  Abdomen: soft, nontender, nondistended, normoactive bowel sounds, no hernias  Back: No CVA tenderness  Extremeties: warm, well-perfused, no  tenderness, normal gait/station.   Bilateral ankles 2+ edema  Neuro: sensation and strength grossly intact and symmetrical  Psych: alert and oriented to person, place and time        IMPRESSION  Patient here for Mediport placement to right chest wall related to triple negative stage III breast cancer that has metastasized to her liver and bone.        PLAN         Orders Placed This Encounter    SCHEDULE SURGERY       Scheduling Instructions:         PROCEDURE: right cephalic vein mediport placement         TIME NEEDED: 1 Hours    XR CHEST PA LAT       Standing Status:   Future       Standing Expiration Date:   8/19/2018       Order Specific Question:   Reason for Exam       Answer:   Mediport placement       Order Specific Question:   Is Patient Allergic to Contrast Dye?       Answer:   No    CBC WITH AUTOMATED DIFF    METABOLIC PANEL, BASIC    EKG, 12 LEAD, INITIAL       Standing Status:   Future       Standing Expiration Date:   1/19/2018       Order Specific Question:   Reason for Exam:       Answer:   mediport placement

## 2017-07-24 NOTE — ANESTHESIA PREPROCEDURE EVALUATION
Anesthetic History   No history of anesthetic complications            Review of Systems / Medical History  Patient summary reviewed and pertinent labs reviewed    Pulmonary        Sleep apnea: CPAP           Neuro/Psych   Within defined limits           Cardiovascular    Hypertension              Exercise tolerance: >4 METS     GI/Hepatic/Renal     GERD           Endo/Other        Morbid obesity     Other Findings   Comments:   Risk Factors for Postoperative nausea/vomiting:       History of postoperative nausea/vomiting? NO       Female? YES       Motion sickness? NO       Intended opioid administration for postoperative analgesia? NO      Smoking Abstinence  Current Smoker? NO  Elective Surgery? YES  Seen preoperatively by anesthesiologist or proxy prior to day of surgery? YES  Pt abstained from smoking 24 hours prior to anesthesia?  N/A           Physical Exam    Airway  Mallampati: II  TM Distance: 4 - 6 cm  Neck ROM: normal range of motion   Mouth opening: Normal     Cardiovascular  Regular rate and rhythm,  S1 and S2 normal,  no murmur, click, rub, or gallop  Rhythm: regular  Rate: normal         Dental    Dentition: Lower dentition intact and Upper dentition intact  Comments: Several missing molars; all remaining teeth intact    Pulmonary  Breath sounds clear to auscultation               Abdominal  GI exam deferred       Other Findings            Anesthetic Plan    ASA: 3  Anesthesia type: MAC          Induction: Intravenous  Anesthetic plan and risks discussed with: Patient

## 2017-07-24 NOTE — INTERVAL H&P NOTE
H&P Update:  Ephraim Antunez was seen and examined. History and physical has been reviewed. The patient has been examined.  There have been no significant clinical changes since the completion of the originally dated History and Physical.    Signed By: Leo Parra MD     July 24, 2017 10:47 AM

## 2017-07-24 NOTE — OP NOTES
[address] [place] [address] [facility],  Judge Cesar Bucio                                 OPERATIVE REPORT    PATIENT:     Madai Cheema  MRN:            [id number]      DATE:   2017    [place]LL[place]  ROOM:       OR/PL  ATTENDING:   Yaneli Gu MD  DICTATING:   Yaneli Gu MD      PREOPERATIVE DIAGNOSIS: Metastatic right breast cancer    POSTOPERATIVE DIAGNOSIS: Same    PROCEDURES PERFORMED: Right cephalic vein MediPort placement. ESTIMATED BLOOD LOSS: 30 mL. SPECIMENS REMOVED: none    SURGEON: Wisam Bryant MD    SURGICAL ASSIST: Gutierrez Centeno SA    ANESTHESIA: MAC and local (0.25% Marcaine with epinephrine). FINDINGS: Both ports aspirated and flushed with ease. The tip of the  catheter was at the SVC-right atrial junction. FLUIDS GIVEN:100 mL. INDICATION: metastatic right breast cancer    DESCRIPTION OF PROCEDURE: The patient was identified in the holding area  with his wife, where consent for right cephalic vein MediPort placement was  verified. In the operating room, the patient was placed under general  anesthesia with the left arm tucked. The chest wall was prepped and draped  in sterile fashion using chlorhexidine solution and sterile drapes. Time-out was  performed to insure correct procedure. The local anesthetic was infiltrated  into the skin and deep dermal tissues in the deltopectoral groove. The 15  blade was used to cut through the skin into the subcutaneous tissue. Electrocautery was used to dissect deeper into the subcutaneous tissue. Jcarlos Reedley was placed for retraction. The cephalic vein was  Identified in the deltopectoral groove. The cephalic  vein was isolated and 3.0 silk suture was placed proximally  and distally. A 15 blade was used to create a venotomy.    The dual lumen Power port catheter was inserted into the cephalic vein under flouroscopic guidance to the RA/SVC junction. The catheter was cut at 25 cm. The vein was ligated with the 3.0 silk suture proximally and distally. The port was attached to the catheter. The catheter aspirated and flushed with ease. A pocket for the port was created using blunt dissection and electrocautery. The catheter was secured in place using 3 interrupted stitches of 2-0 Prolene. The 3-0 Vicryl was used to reapproximate the deep dermal tissues, 4-0 Monocryl was used to close the skin. Dermabond was used as a wound sealant. The patient tolerated the procedure very well. DISPOSITION: He was stable upon transport to the recovery  room.

## 2017-07-24 NOTE — DISCHARGE INSTRUCTIONS
[facility]  Arabella Randle MD, FACS  General Surgery    Pt may shower. Allow soap and water to run over the incision. No driving or operating heavy machinery while on narcotic pain medications. No strenuous activity or contact sports for two weeks. No lifting greater than 15 lbs for 2 weeks. Call MD for any redness, swelling, bleeding or pus at the incision. Also call for any nausea, vomiting, increased pain or pain uncontrolled by pain medicine. Implanted Port: What to Expect at [address] [facility] have had a procedure to implant a port. The port looks like a small bump under your skin. A thin, flexible tube called a catheter runs under the skin from the port into a large vein. You may have the port for weeks, months, or longer. You will be able to get medicine, blood, nutrients, or other fluids with more comfort. The port can be used right away. You will probably have some discomfort and bruising at the port site. This will go away in a few days. You may have strips of tape on the cut (incision) the doctor made, or the cut may have been closed with glue. It may be covered with a small bandage. This care sheet gives you a general idea about how long it will take for you to recover. But each person recovers at a different pace. Follow the steps below to feel better as quickly as possible. How can you care for yourself at home? Activity  · Avoid arm and upper body movements that may pull on the catheter. These movements include heavy weight lifting and vigorous use of your arms. · You will probably need to take 1 day off from work and will be able to return to normal activities shortly after. This depends on the type of work you do, why you have the catheter, and how you feel. · Ask your doctor when you can drive again. Pay special attention when pulling your seat belt across your chest so it doesn't pull out the catheter.  It's okay if the seat belt lays over the catheter. Medicines  · Your doctor will tell you if and when you can restart your medicines. He or she will also give you instructions about taking any new medicines. · If you take blood thinners, such as warfarin (Coumadin), clopidogrel (Plavix), or aspirin, be sure to talk to your doctor. He or she will tell you if and when to start taking those medicines again. Make sure that you understand exactly what your doctor wants you to do. · Take pain medicines exactly as directed. ¨ If the doctor gave you a prescription medicine for pain, take it as prescribed. ¨ If you are not taking a prescription pain medicine, ask your doctor if you can take an over-the-counter medicine. · If you think your pain medicine is making you sick to your stomach:  ¨ Take your medicine after meals (unless your doctor has told you not to). ¨ Ask your doctor for a different pain medicine. Incision care  · If you have a bandage, your doctor will tell you when you can remove it. After you remove the bandage, you may shower. Wash the area with soap and water and pat it dry. Don't use hydrogen peroxide or alcohol, which can slow healing. You may cover the area with a gauze bandage if it weeps or rubs against clothing. Change the bandage every day. · If you have strips of tape on the cut (incision) the doctor made, leave the tape on for a week or until it falls off. Other instructions  · Always carry the medical alert card that your doctor gives you. It contains information about your port. It will tell health care workers you have a port in case you need emergency care. · Wear loose clothing over the port for the first 10 to 14 days. When getting dressed, be careful not to rub the port. Follow-up care is a key part of your treatment and safety. Be sure to make and go to all appointments, and call your doctor if you are having problems. It's also a good idea to know your test results and keep a list of the medicines you take.   When should you call for help? Call 911 anytime you think you may need emergency care. For example, call if:  · You passed out (lost consciousness). · You have severe trouble breathing. · You have sudden chest pain and shortness of breath, or you cough up blood. Call your doctor now or seek immediate medical care if:  · You have signs of infection, such as:  ¨ Increased pain, swelling, warmth, or redness near the port. ¨ Red streaks leading from the port. ¨ Pus draining from the port. ¨ A fever. · You have pain or swelling in your neck or arm. · You have trouble breathing or chest pain. Watch closely for changes in your health, and be sure to contact your doctor if:  · You have any problems with your port. Where can you learn more? Go to http://lis-barry.info/. Enter M256 in the search box to learn more about \"Implanted Port: What to Expect at Home. \"  Current as of: March 20, 2017  Content Version: 11.3  © 1728-8253 [employer]. Care instructions adapted under license by [employer] (which disclaims liability or warranty for this information). If you have questions about a medical condition or this instruction, always ask your healthcare professional. [place] [address] any warranty or liability for your use of this information. Breast Cancer: Care Instructions  Your Care Instructions  Breast cancer occurs when abnormal cells grow out of control in the breast. These cancer cells can spread within the breast, to nearby lymph nodes and other tissues, and to other parts of the body. Being treated for cancer can weaken your body, and you may feel very tired. Get the rest your body needs so you can feel better. Finding out that you have cancer is scary. You may feel many emotions and may need some help coping. Seek out family, friends, and counselors for support.  You also can do things at home to make yourself feel better while you go through treatment. Call the [facility] [employer] (7-612.970.7531) or visit its website at [address] [employer] for more information. Follow-up care is a key part of your treatment and safety. Be sure to make and go to all appointments, and call your doctor if you are having problems. It's also a good idea to know your test results and keep a list of the medicines you take. How can you care for yourself at home? · Take your medicines exactly as prescribed. Call your doctor if you think you are having a problem with your medicine. You may get medicine for nausea and vomiting if you have these side effects. · Follow your doctor's instructions to relieve pain. Pain from cancer and surgery can almost always be controlled. Use pain medicine when you first notice pain, before it becomes severe. · Eat healthy food. If you do not feel like eating, try to eat food that has protein and extra calories to keep up your strength and prevent weight loss. Drink liquid meal replacements for extra calories and protein. Try to eat your main meal early. · Get some physical activity every day, but do not get too tired. Keep doing the hobbies you enjoy as your energy allows. · Do not smoke. Smoking can make your cancer worse. If you need help quitting, talk to your doctor about stop-smoking programs and medicines. These can increase your chances of quitting for good. · Take steps to control your stress and workload. Learn relaxation techniques. ¨ Share your feelings. Stress and tension affect our emotions. By expressing your feelings to others, you may be able to understand and cope with them. ¨ Consider joining a support group. Talking about a problem with your spouse, a good friend, or other people with similar problems is a good way to reduce tension and stress. ¨ Express yourself through art. Try writing, crafts, dance, or art to relieve stress. Some dance, writing, or art groups may be available just for people who have cancer.   ¨ Be kind to your body and mind. Getting enough sleep, eating a healthy diet, and taking time to do things you enjoy can contribute to an overall feeling of balance in your life and can help reduce stress. ¨ Get help if you need it. Discuss your concerns with your doctor or counselor. · If you are vomiting or have diarrhea:  ¨ Drink plenty of fluids (enough so that your urine is light yellow or clear like water) to prevent dehydration. Choose water and other caffeine-free clear liquids. If you have kidney, heart, or liver disease and have to limit fluids, talk with your doctor before you increase the amount of fluids you drink. ¨ When you are able to eat, try clear soups, mild foods, and liquids until all symptoms are gone for 12 to 48 hours. Other good choices include dry toast, crackers, cooked cereal, and gelatin dessert, such as Jell-O.  · If you have not already done so, prepare a list of advance directives. Advance directives are instructions to your doctor and family members about what kind of care you want if you become unable to speak or express yourself. When should you call for help? Call your doctor now or seek immediate medical care if:  · You have a fever. · Any part of your breast becomes red, tender, swollen, or hot. · You have pain, redness, or swelling in the arm on the same side as your breast cancer. Watch closely for changes in your health, and be sure to contact your doctor if:  · You have pain that is not controlled by medicine. · You have nausea or vomiting. · You are constipated or have diarrhea. Where can you learn more? Go to http://lis-barry.info/. Enter V321 in the search box to learn more about \"Breast Cancer: Care Instructions. \"  Current as of: July 26, 2016  Content Version: 11.3  © 4987-6011 [employer]. Care instructions adapted under license by [employer] (which disclaims liability or warranty for this information).  If you have questions about a medical condition or this instruction, always ask your healthcare professional. [address] any warranty or liability for your use of this information. DISCHARGE SUMMARY from Nurse    The following personal items are in your possession at time of discharge:    Dental Appliances: None  Visual Aid: None     Home Medications: None  Jewelry: None  Clothing: Footwear, Pants, Shirt, Undergarments  Other Valuables: None   PATIENT INSTRUCTIONS:    After general anesthesia or intravenous sedation, for 24 hours or while taking prescription Narcotics:  · Limit your activities  · Do not drive and operate hazardous machinery  · Do not make important personal or business decisions  · Do  not drink alcoholic beverages  · If you have not urinated within 8 hours after discharge, please contact your surgeon on call. Report the following to your surgeon:  · Excessive pain, swelling, redness or odor of or around the surgical area  · Temperature over 100.5  · Nausea and vomiting lasting longer than 4 hours or if unable to take medications  · Any signs of decreased circulation or nerve impairment to extremity: change in color, persistent  numbness, tingling, coldness or increase pain  · Any questions      *  Please give a list of your current medications to your Primary Care Provider. *  Please update this list whenever your medications are discontinued, doses are      changed, or new medications (including over-the-counter products) are added. *  Please carry medication information at all times in case of emergency situations. These are general instructions for a healthy lifestyle:    No smoking/ No tobacco products/ Avoid exposure to second hand smoke    Surgeon General's Warning:  Quitting smoking now greatly reduces serious risk to your health.     Obesity, smoking, and sedentary lifestyle greatly increases your risk for illness    A healthy diet, regular physical exercise & weight monitoring are important for maintaining a healthy lifestyle    You may be retaining fluid if you have a history of heart failure or if you experience any of the following symptoms:  Weight gain of 3 pounds or more overnight or 5 pounds in a week, increased swelling in our hands or feet or shortness of breath while lying flat in bed. Please call your doctor as soon as you notice any of these symptoms; do not wait until your next office visit. Recognize signs and symptoms of STROKE:    F-face looks uneven    A-arms unable to move or move unevenly    S-speech slurred or non-existent    T-time-call 911 as soon as signs and symptoms begin-DO NOT go       Back to bed or wait to see if you get better-TIME IS BRAIN. Warning Signs of HEART ATTACK     Call 911 if you have these symptoms:   Chest discomfort. Most heart attacks involve discomfort in the center of the chest that lasts more than a few minutes, or that goes away and comes back. It can feel like uncomfortable pressure, squeezing, fullness, or pain.  Discomfort in other areas of the upper body. Symptoms can include pain or discomfort in one or both arms, the back, neck, jaw, or stomach.  Shortness of breath with or without chest discomfort.  Other signs may include breaking out in a cold sweat, nausea, or lightheadedness. Don't wait more than five minutes to call 911 - MINUTES MATTER! Fast action can save your life. Calling 911 is almost always the fastest way to get lifesaving treatment. Emergency Medical Services staff can begin treatment when they arrive -- up to an hour sooner than if someone gets to the hospital by car. The discharge information has been reviewed with the patient and family. The patient and family verbalized understanding. Discharge medications reviewed with the patient and family and appropriate educational materials and side effects teaching were provided.

## 2017-07-24 NOTE — DISCHARGE SUMMARY
[facility]  Dolly Posada MD, [facility]  General Surgery  Discharge Summary     Patient ID:  Janay Trejo  [id number]  71 y.o.  1955    Admit Date: 7/24/2017    Discharge Date: 7/24/2017    Admission Diagnoses: Malignant neoplasm of right female breast, unspecified site*    Discharge Diagnoses:    Problem List as of 7/24/2017  Date Reviewed: 7/24/2017          Codes Class Noted - Resolved    Metastatic breast cancer ([address] [facility] ICD-10-CM: C50.919, C79.9  ICD-9-CM: 174.9, 199.1  7/24/2017 - Present        Hyperlipidemia ICD-10-CM: E78.5  ICD-9-CM: 272.4  7/21/2017 - Present        Diabetes ([address] [facility] ICD-10-CM: E11.9  ICD-9-CM: 250.00  Unknown - Present        Malignant neoplasm of female breast ([address] [facility] ICD-10-CM: C50.919  ICD-9-CM: 174.9  4/20/2016 - Present    Overview Signed 4/20/2016  1:15 PM by Braydon Gerard MD     starting chemo  baseline ef normal             Personal history of malignant neoplasm of breast ICD-10-CM: Z85.3  ICD-9-CM: V10.3  4/4/2016 - Present        VIRGINIE on CPAP ICD-10-CM: G47.33, Z99.89  ICD-9-CM: 327.23, V46.8  4/20/2015 - Present        Edema ICD-10-CM: R60.9  ICD-9-CM: 782.3  4/20/2015 - Present    Overview Signed 4/20/2015 12:42 PM by Braydon Gerard MD     likely dependent edema  normal ef               Angina decubitus ([address] [facility] ICD-10-CM: I20.8  ICD-9-CM: 413.0  9/22/2014 - Present    Overview Signed 9/22/2014 11:55 AM by Braydon Gerard MD     exertional chest tightness foe a few weeks  progressively worse associated with sob             Benign hypertensive heart disease without heart failure ICD-10-CM: I11.9  ICD-9-CM: 402.10  9/22/2014 - Present    Overview Signed 9/22/2014 11:55 AM by Braydon Gerard MD     exertional chest tightness foe a few weeks  progressively worse associated with sob             Cardiomegaly ICD-10-CM: I51.7  ICD-9-CM: 429.3  9/22/2014 - Present    Overview Signed 9/22/2014 11:55 AM by Braydon Gerard MD     exertional chest tightness foe a few weeks  progressively worse associated with sob             Abnormal nuclear cardiac imaging test ICD-10-CM: R93.1  ICD-9-CM: 794.39  9/22/2014 - Present    Overview Signed 9/22/2014 11:55 AM by Lotus Estevez MD     exertional chest tightness foe a few weeks  progressively worse associated with sob             Obesity ICD-10-CM: E66.9  ICD-9-CM: 278.00  9/22/2014 - Present    Overview Signed 9/22/2014 11:55 AM by Lotus Estevez MD     exertional chest tightness foe a few weeks  progressively worse associated with sob             Retinal detachment ICD-10-CM: H33.20  ICD-9-CM: 361.9  6/30/2014 - Present    Overview Signed 6/30/2014  8:25 AM by Brittany Domingo out retinal detachment             RESOLVED: Intermediate coronary syndrome ([facility] [address] [facility] ICD-10-CM: I20.0  ICD-9-CM: 411.1  9/22/2014 - 5/20/2016    Overview Signed 9/22/2014 11:55 AM by Lotus Estevez MD     increasing chest tightness and sob                    Admission Condition: Good    Discharge Condition: Good    Last Procedure: Procedure(s):  RIGHT CEPHALIC VEIN [address] [facility] Course:   Normal hospital course for this procedure. Consults: None    Significant Diagnostic Studies: none    Disposition: home    Patient Instructions:   Current Discharge Medication List      START taking these medications    Details   HYDROcodone-acetaminophen (NORCO) 5-325 mg per tablet 1-2 tabs every 4-6 hours as needed for pain  Qty: 30 Tab, Refills: 0         CONTINUE these medications which have NOT CHANGED    Details   docusate sodium (COLACE) 100 mg capsule Take 1 Cap by mouth two (2) times a day for 90 days. Qty: 60 Cap, Refills: 2    Associated Diagnoses: Constipation, unspecified constipation type      cyclobenzaprine (FLEXERIL) 10 mg tablet Take 1 Tab by mouth nightly. Indications:  MUSCLE SPASM  Qty: 30 Tab, Refills: 0    Associated Diagnoses: Acute bilateral low back pain without sciatica      Lactobacillus Acidoph & Bulgar (FLORANEX) 1 million cell tab tablet Take 2 Tabs by mouth two (2) times a day. Qty: 120 Tab, Refills: 3      CHROM GUILHERME/BRINDAL BERRY (GARCINIA CAMBOGIA PO) Take  by mouth. Indications: 2 tablets daily      furosemide (LASIX) 40 mg tablet Take 1 Tab by mouth two (2) times a day. Qty: 60 Tab, Refills: 3    Associated Diagnoses: Benign hypertensive heart disease without heart failure      citalopram (CELEXA) 20 mg tablet Take 1 Tab by mouth daily. Qty: 30 Tab, Refills: 3    Associated Diagnoses: Depression, unspecified depression type      spironolactone (ALDACTONE) 50 mg tablet Take 1 Tab by mouth two (2) times a day. Qty: 60 Tab, Refills: 3    Associated Diagnoses: Benign hypertensive heart disease without heart failure      MULTIVIT-MINERALS/FERROUS FUM (MULTI VITAMIN PO) Take  by mouth. cholecalciferol, vitamin D3, (VITAMIN D3) 2,000 unit tab Take  by mouth.      isosorbide mononitrate ER (IMDUR) 30 mg tablet TAKE 1 TABLET BY MOUTH EVERY MORNING. Qty: 30 Tab, Refills: 5      metoprolol tartrate (LOPRESSOR) 50 mg tablet Take 1 Tab by mouth two (2) times a day. Qty: 60 Tab, Refills: 6           Activity: Activity as tolerated  Diet: Low fat, Low cholesterol  Wound Care: As directed    Follow-up with Dr. Dickerson Ran in 2 weeks.   Follow-up tests/labs None    Signed:  Taco Colin MD  7/24/2017  12:58 PM

## 2017-07-24 NOTE — ANESTHESIA POSTPROCEDURE EVALUATION
Post-Anesthesia Evaluation and Assessment    Patient: Madai Cheema MRN: [id number]  SSN: [id number]    YOB: 1955  Age: 59 y.o. Sex: female     VS from flow sheet    Cardiovascular Function/Vital Signs  Visit Vitals    /76    Pulse 94    Temp 36.8 °C (98.2 °F)    Resp 19    Ht 5' 3\" (1.6 m)    Wt 111.6 kg (246 lb)    SpO2 92%    BMI 43.58 kg/m2       Patient is status post MAC anesthesia for Procedure(s):  RIGHT CEPHALIC VEIN MEDIPORT PLACEMENT . Nausea/Vomiting: None    Postoperative hydration reviewed and adequate. Pain:  Pain Scale 1: Numeric (0 - 10) (07/24/17 1348)  Pain Intensity 1: 0 (07/24/17 1348)   Managed    Neurological Status:   Neuro (WDL): Within Defined Limits (07/24/17 1255)   At baseline    Mental Status and Level of Consciousness: Arousable    Pulmonary Status:   O2 Device: Room air (07/24/17 1330)   Adequate oxygenation and airway patent    Complications related to anesthesia: None    Post-anesthesia assessment completed.  No concerns    Signed By: Louis Hunt MD     July 24, 2017

## 2017-08-23 NOTE — TELEPHONE ENCOUNTER
Pt daughter called Meghan Goodman wanting office to call her in regards to her mother Malik Evangelist, returned call, left message to call [facility]

## 2017-08-29 NOTE — PROGRESS NOTES
[facility]  General Surgery    Name: Janay Trejo MRN: [id number]   : 1955 [facility]   Date: 2017 Admission Date: No admission date for patient encounter. Subjective:  Patient is 1 month out from right chest wall MediPort placement. We were asked to see her by the medical oncology group for possible migration of the MediPort. I reviewed the chest x-ray from July and the chest x-ray from August independently on the monitor which reveals that the port is in the same position that it was after he was placed in July. The patient states that today the oncology nurse who typically works with her was present in the clinic. She was able to access the port without difficulty. On last week we are initially asked to see the patient there was some difficulty accessing the port by some other members of the team.  Objective:  Vitals:    17 1329   BP: 126/70   Pulse: 84   Resp: 24   Temp: 98.2 °F (36.8 °C)   Weight: 108 kg (238 lb)   Height: 5' 3\" (1.6 m)       Physical Exam:    General: Awake and alert, oriented ×4, no apparent distress   Chest: The incision is clean dry and intact at the right deltopectoral groove. The port is palpable. Current Medications:  Current Outpatient Prescriptions   Medication Sig Dispense Refill    citalopram (CELEXA) 20 mg tablet TAKE ONE TABLET BY MOUTH ONE TIME DAILY  30 Tab 2    spironolactone (ALDACTONE) 50 mg tablet TAKE ONE TABLET BY MOUTH TWICE DAILY  60 Tab 2    isosorbide mononitrate ER (IMDUR) 30 mg tablet TAKE ONE TABLET BY MOUTH IN THE MORNING  30 Tab 4    Lactobacillus Acidoph & Bulgar (FLORANEX) 1 million cell tab tablet Take 2 Tabs by mouth two (2) times a day. 120 Tab 3    furosemide (LASIX) 40 mg tablet Take 1 Tab by mouth two (2) times a day. 60 Tab 3    MULTIVIT-MINERALS/FERROUS FUM (MULTI VITAMIN PO) Take  by mouth.  cholecalciferol, vitamin D3, (VITAMIN D3) 2,000 unit tab Take  by mouth.  metoprolol tartrate (LOPRESSOR) 50 mg tablet Take 1 Tab by mouth two (2) times a day. 60 Tab 6    citalopram (CELEXA) 20 mg tablet TAKE ONE TABLET BY MOUTH ONE TIME DAILY  30 Tab 2    HYDROcodone-acetaminophen (NORCO) 5-325 mg per tablet 1-2 tabs every 4-6 hours as needed for pain 30 Tab 0    docusate sodium (COLACE) 100 mg capsule Take 1 Cap by mouth two (2) times a day for 90 days. 60 Cap 2    cyclobenzaprine (FLEXERIL) 10 mg tablet Take 1 Tab by mouth nightly. Indications: MUSCLE SPASM 30 Tab 0    CHROM GUILHERME/BRINDAL BERRY (GARCINIA CAMBOGIA PO) Take  by mouth. Indications: 2 tablets daily         Chart and notes reviewed. Data reviewed. I have evaluated and examined the patient. 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       PLAN:/DISCUSION:   · No need for surgical intervention  · Please return when you are ready for the Mediport to be removed        Regina Watts MD

## 2017-09-05 NOTE — TELEPHONE ENCOUNTER
Patient's last office visit on 07/21/17  Medication(s) last filled on 04/21/17  Next Appointment: 10/23/17  Rx Class Normal

## 2017-09-05 NOTE — TELEPHONE ENCOUNTER
From: Tammi Roberts  To: Tanika White MD  Sent: 9/4/2017 1:55 PM EDT  Subject: Medication Renewal Request    Original authorizing provider: MD Tammi Benavides would like a refill of the following medications:  furosemide (LASIX) 40 mg tablet Tanika White MD]    Preferred pharmacy: [facility] #[id number] [place] [address]. Comment:  Pharmacist has faxed request over twice. I have called but was unable to reach any one regarding this matter. I have been with out this medication for 4 days now. Please call in refill. Pharmacy number [id number]. [facility]ia I use the name brand Lasix.

## 2017-09-11 NOTE — PERIOP NOTES
Patient armband removed and shredded    Patient confirmed by two identifiers with discharge instructions prior to being provided to patient and daughter induction 41wks  csection failed induction  urinary retention pod#1: vasquez reinsertion: resolved induction 41wks  csection failed induction  urinary retention pod#1: vasquez reinsertion: resolved  poD#3 bladder scan full: straight catheter per dr chandler: noted 840ml of urine pungent smell cloudy sent again for   urinalysis and urine culture: base on sx: urgency frequency smelly urine/cloudy augmentin started  per dr olanescu if sx of retention persist: re insert vasquez go home with leg bag augmentin and to f/u with dr penaloza   9/14/17

## 2017-09-13 NOTE — LETTER
Cecilia Dickeywest 1955 9/13/2017 Dear Michael Dominguez MD 
 
I had the pleasure of evaluating  Ms. Shelley Matos in office today. Below are the relevant portions of my assessment and plan of care. ICD-10-CM ICD-9-CM 1. Angina decubitus (HCC) I20.8 413.0   
 stable 
on treatment 2. Benign hypertensive heart disease without heart failure I11.9 402.10   
 controlled bp 3. Metastatic breast cancer (HCC) C50.919 174.9   
 C79.9 199.1   
 on chemo 4. VIRGINIE on CPAP G47.33 327.23   
 Z99.89 V46.8   
 stable Current Outpatient Prescriptions Medication Sig Dispense Refill  B.infantis-B.ani-B.long-B.bifi (PROBIOTIC 4X) 10-15 mg TbEC Take  by mouth two (2) times a day.  metoprolol tartrate (LOPRESSOR) 50 mg tablet TAKE ONE TABLET BY MOUTH TWICE DAILY  60 Tab 5  furosemide (LASIX) 40 mg tablet Take 1 Tab by mouth two (2) times a day. 60 Tab 3  
 citalopram (CELEXA) 20 mg tablet TAKE ONE TABLET BY MOUTH ONE TIME DAILY  30 Tab 2  
 spironolactone (ALDACTONE) 50 mg tablet TAKE ONE TABLET BY MOUTH TWICE DAILY  60 Tab 2  
 isosorbide mononitrate ER (IMDUR) 30 mg tablet TAKE ONE TABLET BY MOUTH IN THE MORNING  30 Tab 4  
 HYDROcodone-acetaminophen (NORCO) 5-325 mg per tablet 1-2 tabs every 4-6 hours as needed for pain 30 Tab 0  MULTIVIT-MINERALS/FERROUS FUM (MULTI VITAMIN PO) Take  by mouth.  cholecalciferol, vitamin D3, (VITAMIN D3) 2,000 unit tab Take  by mouth. Orders Placed This Encounter  B.infantis-B.ani-B.long-B.bifi (PROBIOTIC 4X) 10-15 mg TbEC Sig: Take  by mouth two (2) times a day. If you have questions, please do not hesitate to call me. I look forward to following Ms. Shelley Matos along with you. Sincerely, Franklin Alatorre MD

## 2017-09-13 NOTE — PROGRESS NOTES
Patient didn't bring medications, verbally reviewed    1. Have you been to the ER, urgent care clinic since your last visit? Hospitalized since your last visit? No    2. Have you seen or consulted any other health care providers outside of the [address] [place] [address] since your last visit? Include any pap smears or colon screening.  Yes Where: [facility]

## 2017-09-13 NOTE — PROGRESS NOTES
HISTORY OF PRESENT ILLNESS  Joby Fernández is a 59 y.o. female. HPI Comments:       Chest Pain (Angina)    The history is provided by the patient. This is a chronic problem. The problem has been rapidly improving. The problem occurs rarely. The pain is associated with exertion. The pain is present in the substernal region. The quality of the pain is described as pressure-like. The pain does not radiate. Pertinent negatives include no claudication, no cough, no dizziness, no fever, no hemoptysis, no nausea, no orthopnea, no palpitations, no PND, no shortness of breath, no sputum production, no vomiting and no weakness. Hypertension   The history is provided by the patient. This is a chronic problem. The problem occurs constantly. The problem has not changed since onset. Associated symptoms include chest pain. Pertinent negatives include no shortness of breath. Shortness of Breath   The history is provided by the patient. This is a chronic problem. The problem occurs rarely. The problem has been rapidly improving. Associated symptoms include chest pain. Pertinent negatives include no fever, no cough, no sputum production, no hemoptysis, no wheezing, no PND, no orthopnea, no vomiting, no rash, no leg swelling and no claudication. Precipitated by: exertion. Review of Systems   Constitutional: Negative for chills and fever. HENT: Negative for nosebleeds. Eyes: Negative for blurred vision and double vision. Respiratory: Negative for cough, hemoptysis, sputum production, shortness of breath and wheezing. Cardiovascular: Positive for chest pain. Negative for palpitations, orthopnea, claudication, leg swelling and PND. Gastrointestinal: Negative for heartburn, nausea and vomiting. Musculoskeletal: Negative for myalgias. Skin: Negative for rash. Neurological: Negative for dizziness and weakness. Endo/Heme/Allergies: Does not bruise/bleed easily.      Family History   Problem Relation Age of Onset    Arthritis-osteo Mother     Hypertension Mother     Arthritis-osteo Maternal Grandmother     Heart Disease Maternal Grandmother     Hypertension Maternal Grandmother     Arthritis-osteo Paternal Grandmother     Cancer Paternal Grandmother     Heart Disease Paternal Grandmother     Hypertension Paternal Grandmother     Arthritis-osteo Father     Cancer Father     Heart Disease Father     Hypertension Father     Lung Disease Father     Stroke Father     Cancer Paternal Grandfather     Lung Disease Paternal Grandfather     Stroke Paternal Grandfather     Hypertension Maternal Grandfather     Stroke Maternal Grandfather        Past Medical History:   Diagnosis Date    Abnormal nuclear cardiac imaging test 9/22/2014    exertional chest tightness foe a few weeks progressively worse associated with sob     Arthritis     Asthma     Over 1 yr last problem with asthma had to use inhaler    Benign hypertensive heart disease without heart failure 9/22/2014    exertional chest tightness foe a few weeks progressively worse associated with sob     Breast cancer ([address] 2014    right    Cancer ([address] 12/5/14    Cancer ([address] 4-4-16    lymph nodes    Cardiomegaly 9/22/2014    exertional chest tightness foe a few weeks progressively worse associated with sob     Congestive heart failure, unspecified     Depression 01/1989    GERD (gastroesophageal reflux disease)     GERD (gastroesophageal reflux disease)     Heart failure (HCC)     Hypertension     Intermediate coronary syndrome ([address] 9/22/2014    increasing chest tightness and sob     Irregular heart beat     Palpitations    Obesity, unspecified 9/22/2014    exertional chest tightness foe a few weeks progressively worse associated with sob     Other and unspecified angina pectoris 9/22/2014    exertional chest tightness foe a few weeks progressively worse associated with sob     Other and unspecified angina pectoris 9/22/2014    exertional chest tightness foe a few weeks progressively worse associated with sob     Sleep apnea     cpap    Unspecified sleep apnea     cpap       Past Surgical History:   Procedure Laterality Date    BIOPSY OF BREAST, INCISIONAL      LT BENIGN, 15 YRS. AGO    CARDIAC SURG PROCEDURE UNLIST      HX BREAST BIOPSY Left     HX BREAST BIOPSY Right 12/5/2014    RIGHT BREAST BIOPSY WITH NEEDLE LOCALIZATION ULTRASOUND TIMES TWO performed by Amanda Ortiz MD at [facility] MAIN OR    HX BREAST BIOPSY Right 2/11/2015    RIGHT BREAST EXPLORATION performed by Amanda Ortiz MD at [address] [place] [address] HX BUNIONECTOMY Right     HX CATARACT REMOVAL Bilateral     HX CATARACT REMOVAL      HX COLONOSCOPY      HX GYN      Laparoscopy    HX MASTECTOMY Right 2/4/2015    RIGHT PARTIAL MASTECTOMY/SENTINEL LYMPH NODE BIOPSY/POSSIBLE AXILLARY LYMPH NODE DISECTION performed by Amanda Ortiz MD at [facility] MAIN OR    HX MASTECTOMY Right 3/25/2016    RIGHT AXILLARY LYMPH NODE DISECTION/RIGHT PARTIAL MASTECTOMY performed by Amanda Ortiz MD at [facility] MAIN OR    HX ORTHOPAEDIC Right     bunionectomy    HX VASCULAR ACCESS  2016    mediport    [place] PRP CTR VAD W/SUBQ PORT AGE 5 YR/> N/A 07/24/2017    Dr. Misha Looney    [place] INSJ TUNNELED CTR VAD W/SUBQ PORT AGE 5 YR/>  5-4-16    Dr. John Anderson VAD W/SUBQ PORT/ CTR/PRPH INSJ N/A 05/01/2017    Dr. Misha Looney       Social History   Substance Use Topics    Smoking status: Former Smoker     Quit date: 9/22/1994    Smokeless tobacco: Never Used    Alcohol use Yes      Comment: occasionally       Allergies   Allergen Reactions    Pcn [Penicillins] Rash       Current Outpatient Prescriptions   Medication Sig    B.infantis-B.ani-B.long-B.bifi (PROBIOTIC 4X) 10-15 mg TbEC Take  by mouth two (2) times a day.  metoprolol tartrate (LOPRESSOR) 50 mg tablet TAKE ONE TABLET BY MOUTH TWICE DAILY     furosemide (LASIX) 40 mg tablet Take 1 Tab by mouth two (2) times a day.     citalopram (CELEXA) 20 mg tablet TAKE ONE TABLET BY MOUTH ONE TIME DAILY     spironolactone (ALDACTONE) 50 mg tablet TAKE ONE TABLET BY MOUTH TWICE DAILY     isosorbide mononitrate ER (IMDUR) 30 mg tablet TAKE ONE TABLET BY MOUTH IN THE MORNING     HYDROcodone-acetaminophen (NORCO) 5-325 mg per tablet 1-2 tabs every 4-6 hours as needed for pain    MULTIVIT-MINERALS/FERROUS FUM (MULTI VITAMIN PO) Take  by mouth.  cholecalciferol, vitamin D3, (VITAMIN D3) 2,000 unit tab Take  by mouth. No current facility-administered medications for this visit. Visit Vitals    /67    Pulse 93    Ht 5' 3\" (1.6 m)    LMP 05/01/1993         Physical Exam   Constitutional: She is oriented to person, place, and time. She appears well-developed and well-nourished. HENT:   Head: Normocephalic and atraumatic. Eyes: Conjunctivae are normal.   Neck: Neck supple. No JVD present. No tracheal deviation present. No thyromegaly present. Cardiovascular: Normal rate and regular rhythm. PMI is not displaced. Exam reveals no gallop, no S3 and no decreased pulses. No murmur heard. Pulmonary/Chest: No respiratory distress. She has no wheezes. She has no rales. She exhibits no tenderness. Abdominal: Soft. There is no tenderness. Musculoskeletal: She exhibits no edema. Neurological: She is alert and oriented to person, place, and time. Skin: Skin is warm. Psychiatric: She has a normal mood and affect. Ms. Neil Jansen has a reminder for a \"due or due soon\" health maintenance. I have asked that she contact her primary care provider for follow-up on this health maintenance. DIAGNOSES: 9/2014  1. Abnormal scan with evidence of a moderate area of fixed defect of   anterior wall noted from his nuclear study which is associated with normal   wall motion and systolic function which could be prolonged   ischemia, although tissue attenuation is also possible as cause of this   defect.    2. Anteroseptal wall motion abnormality seen on gated study. Does not   correlate with any perfusion abnormality. Clinical correlation is advised. SUMMARY:echo:9/2014  Left ventricle: Systolic function was normal by visual assessment. Ejection fraction was estimated in the range of 55 % to 60 %. No obvious  wall motion abnormalities identified in the views obtained. Wall thickness  was mildly to moderately increased. There was moderate concentric  hypertrophy. Doppler parameters were consistent with abnormal left  ventricular relaxation (grade 1 diastolic dysfunction). Right ventricle: Systolic pressure was at the upper limits of normal.  Estimated peak pressure was 30 mmHg. IMPRESSION 10/2014  1. Normal left ventricular size, wall motion and systolic function. 2. No significant epicardial coronary artery disease. 3. Slow flow noted in all coronary arteries with first injection. Echo:4/2016  Normal lvef 65%  5/2016-echo  Normal lvef-60%  Mild lvh    6/2016  Echo-ef normal  7/15/2016  Echo-normal lvef  No change    8/10/2016-echo  Ef normal     SUMMARY:echo-9/2016  Left ventricle: Systolic function was normal. Ejection fraction was  estimated to be 60 %. No obvious wall motion abnormalities identified in  the views obtained. There was mild concentric hypertrophy. Aortic valve: The valve was trileaflet. Leaflets exhibited mildly  increased thickness, normal cuspal separation, and sclerosis. Tricuspid valve: Pulmonary artery systolic pressure: 28 mmHg. Assessment       ICD-10-CM ICD-9-CM    1. Angina decubitus (HCC) I20.8 413.0     stable  on treatment   2. Benign hypertensive heart disease without heart failure I11.9 402.10     controlled bp   3. Metastatic breast cancer (HCC) C50.919 174.9     C79.9 199.1     on chemo   4. VIRGINIE on CPAP G47.33 327.23     Z99.89 V46.8     stable           No orders of the defined types were placed in this encounter. Follow-up Disposition:  Return in about 6 months (around 3/13/2018).

## 2017-09-13 NOTE — MR AVS SNAPSHOT
Visit Information Date & Time Provider Department Dept. Phone Encounter #  
 9/13/2017  9:15 AM Jose Mcmahan MD [facility] [address] [place] [address] [id number] Follow-up Instructions Return in about 6 months (around 3/13/2018). Your Appointments 10/23/2017 10:30 AM  
ROUTINE CARE with Swati Red MD  
[employer] ([facility]) Appt Note: 3 mos follow up, HTN, DM, Obesity, HLD (Dr. Nadia Whyte pt transferred); R/S FROM 10/24/2017 14 [facility] [address] [facility] [address]  
[phone]  
  
   
 14 [address] 3/14/2018  9:00 AM  
ESTABLISHED PATIENT with Jose Mcmahan MD  
[facility]) Appt Note: 6 months [address] [facility], [address] [place] [address]  
1338 [facility], [address] [place] [address] 35 [facility] Upcoming Health Maintenance Date Due  
 EYE EXAM RETINAL OR DILATED Q1 12/24/1963 Pneumococcal 19-64 Highest Risk (1 of 3 - PCV13) 12/24/1972 DTaP/Tdap/Td series (1 - Tdap) 12/24/1974 FOBT Q 1 YEAR AGE 50-75 12/24/2003 ZOSTER VACCINE AGE 60> 10/24/2013 PAP AKA CERVICAL CYTOLOGY 10/24/2014 INFLUENZA AGE 9 TO ADULT 8/1/2017 HEMOGLOBIN A1C Q6M 10/28/2017 LIPID PANEL Q1 4/27/2018 FOOT EXAM Q1 7/21/2018 MICROALBUMIN Q1 7/21/2018 BREAST CANCER SCRN MAMMOGRAM 3/14/2019 Allergies as of 9/13/2017  Review Complete On: 9/13/2017 By: Jose Mcmahan MD  
  
 Severity Noted Reaction Type Reactions Pcn [Penicillins]  01/16/2013    Rash Current Immunizations  Never Reviewed No immunizations on file. Not reviewed this visit You Were Diagnosed With   
  
 Codes Comments Angina decubitus ([facility]    -  Primary ICD-10-CM: I20.8 ICD-9-CM: 413.0 stable 
on treatment Benign hypertensive heart disease without heart failure     ICD-10-CM: I11.9 ICD-9-CM: 402.10 controlled bp Metastatic breast cancer ([facility]     ICD-10-CM: C50.919, C79.9 ICD-9-CM: 174.9, 199.1 on chemo VIRGINIE on CPAP     ICD-10-CM: G47.33, Z99.89 ICD-9-CM: 327.23, V46.8 stable Vitals BP Pulse Height(growth percentile) LMP OB Status Smoking Status 113/67 93 5' 3\" (1.6 m) 05/01/1993 Postmenopausal Former Smoker Vitals History Preferred Pharmacy Pharmacy Name Phone [id number], [address] [place] [address]. [phone] Your Updated Medication List  
  
   
This list is accurate as of: 9/13/17  9:32 AM.  Always use your most recent med list.  
  
  
  
  
 citalopram 20 mg tablet Commonly known as:  CELEXA  
TAKE ONE TABLET BY MOUTH ONE TIME DAILY  
  
 furosemide 40 mg tablet Commonly known as:  LASIX Take 1 Tab by mouth two (2) times a day. HYDROcodone-acetaminophen 5-325 mg per tablet Commonly known as:  NORCO  
1-2 tabs every 4-6 hours as needed for pain  
  
 isosorbide mononitrate ER 30 mg tablet Commonly known as:  IMDUR  
TAKE ONE TABLET BY MOUTH IN THE MORNING  
  
 metoprolol tartrate 50 mg tablet Commonly known as:  LOPRESSOR  
TAKE ONE TABLET BY MOUTH TWICE DAILY MULTI VITAMIN PO Take  by mouth. PROBIOTIC 4X 10-15 mg Tbec Generic drug:  B.infantis-B.ani-B.long-B.bifi Take  by mouth two (2) times a day. spironolactone 50 mg tablet Commonly known as:  ALDACTONE  
TAKE ONE TABLET BY MOUTH TWICE DAILY  
  
 VITAMIN D3 2,000 unit Tab Generic drug:  cholecalciferol (vitamin D3) Take  by mouth. Follow-up Instructions Return in about 6 months (around 3/13/2018). To-Do List   
 09/18/2017 9:00 AM  
  Appointment with KAROL BOLAÑOS at [address] [place] [address] ([phone]) PAYMENT  For Non-Medicare patients - $15.00 will be collected from you at the time of your exam.  You will be billed $35.00 from the reading [facility]. OUTSIDE FILMS  - Any outside films related to the study being scheduled should be brought with you on the day of the exam.  If this cannot be done there may be a delay in the reading of the study. MEDICATIONS  - Patient must bring a complete list of all medications currently taking to include prescriptions, over-the-counter meds, herbals, vitamins & any dietary supplements GENERAL INSTRUCTIONS  - On the day of your exam do not use any bath powder, deodorant or lotions on the armpit area. Introducing [facility] HE[facility]! Dear Karen Hannah: 
Thank you for requesting a [employer] account. Our records indicate that you already have an active [employer] account. You can access your account anytime at [email or web address] Did you know that you can access your hospital and ER discharge instructions at any time in [employer]? You can also review all of your test results from your hospital stay or ER visit. Additional Information If you have questions, please visit the Frequently Asked Questions section of the [employer] website at [email or web address] Remember, [employer] is NOT to be used for urgent needs. For medical emergencies, dial 911. Now available from your iPhone and Android! Please provide this summary of care documentation to your next provider. Your primary care clinician is listed as Víctor Diez. If you have any questions after today's visit, please call [phone].

## 2017-10-16 PROBLEM — G45.9 TIA (TRANSIENT ISCHEMIC ATTACK): Status: ACTIVE | Noted: 2017-01-01

## 2017-10-16 NOTE — ED NOTES
Hourly rounding:  VS stable. Patient eating soup that family member brought. No complaints of pain. Call bell within reach. Siderails up x 2. No other needs at this time.

## 2017-10-16 NOTE — ED PROVIDER NOTES
HPI Comments: 11:45 AM Ludin Stewart is a 59 y.o. female with a h/o HTN, Acid Reflux, Sleep Apnea, Irregular Heart Beat, CHF, Right breast cancer, and Smoking presents to the ED via EMS from her Oncologists office for possible stroke onset 30 min ago. Per EMS stated while the pt was being evaluated at her Oncologist office she displayed some facial droop and slurred speech that last for 2 minutes. Pt stated she finished chemotherapy 2 weeks ago and is undergoing radiation now. Pt reported fatigue. Pt denied n/v/d, CP, SOB, or cough. All other sx denied. No other complaints at this time. PCP-Ninoska Alcantar MD    The history is provided by the patient.         Past Medical History:   Diagnosis Date    Abnormal nuclear cardiac imaging test 9/22/2014    exertional chest tightness foe a few weeks progressively worse associated with sob     Arthritis     Asthma     Over 1 yr last problem with asthma had to use inhaler    Benign hypertensive heart disease without heart failure 9/22/2014    exertional chest tightness foe a few weeks progressively worse associated with sob     Breast cancer ([address] 2014    right    Cancer ([address] 12/5/14    Cancer ([address] 4-4-16    lymph nodes    Cardiomegaly 9/22/2014    exertional chest tightness foe a few weeks progressively worse associated with sob     Congestive heart failure, unspecified     Depression 01/1989    GERD (gastroesophageal reflux disease)     GERD (gastroesophageal reflux disease)     Heart failure (HCC)     Hypertension     Intermediate coronary syndrome ([address] 9/22/2014    increasing chest tightness and sob     Irregular heart beat     Palpitations    Obesity, unspecified 9/22/2014    exertional chest tightness foe a few weeks progressively worse associated with sob     Other and unspecified angina pectoris 9/22/2014    exertional chest tightness foe a few weeks progressively worse associated with sob     Other and unspecified angina pectoris 9/22/2014    exertional chest tightness foe a few weeks progressively worse associated with sob     Radiation therapy complication     Sleep apnea     cpap    Unspecified sleep apnea     cpap       Past Surgical History:   Procedure Laterality Date    BIOPSY OF BREAST, INCISIONAL      LT BENIGN, 15 YRS.  AGO    CARDIAC SURG PROCEDURE UNLIST      HX BREAST BIOPSY Left     HX BREAST BIOPSY Right 12/5/2014    RIGHT BREAST BIOPSY WITH NEEDLE LOCALIZATION ULTRASOUND TIMES TWO performed by Anne Marie López MD at [address] [place] [address] HX BREAST BIOPSY Right 2/11/2015    RIGHT BREAST EXPLORATION performed by Anne Marie López MD at [address] [place] [address] HX BUNIONECTOMY Right     HX CATARACT REMOVAL Bilateral     HX CATARACT REMOVAL      HX COLONOSCOPY      HX GYN      Laparoscopy    HX MASTECTOMY Right 2/4/2015    RIGHT PARTIAL MASTECTOMY/SENTINEL LYMPH NODE BIOPSY/POSSIBLE AXILLARY LYMPH NODE DISECTION performed by Anne Marie López MD at [address] [place] [address] HX MASTECTOMY Right 3/25/2016    RIGHT AXILLARY LYMPH NODE DISECTION/RIGHT PARTIAL MASTECTOMY performed by Anne Marie López MD at [address] [place] [address] HX ORTHOPAEDIC Right     bunionectomy    HX VASCULAR ACCESS  2016    mediport    IA INSJ PRPH CTR VAD W/SUBQ PORT AGE 5 YR/> N/A 07/24/2017    Dr. Yolanda Corral    IA INSJ TUNNELED CTR VAD W/SUBQ PORT AGE 5 YR/>  5-4-16    Dr. Abbi Viveros RMVL ROM CTR VAD W/SUBQ PORT/ CTR/PRPH INSJ N/A 05/01/2017    Dr. Yolanda Corral         Family History:   Problem Relation Age of Onset    Arthritis-osteo Mother     Hypertension Mother     Arthritis-osteo Maternal Grandmother     Heart Disease Maternal Grandmother     Hypertension Maternal Grandmother     Arthritis-osteo Paternal Grandmother     Cancer Paternal Grandmother     Heart Disease Paternal Grandmother     Hypertension Paternal Grandmother     Arthritis-osteo Father     Cancer Father     Heart Disease Father     Hypertension Father     Lung Disease Father     Stroke Father     Cancer Paternal Grandfather     Lung Disease Paternal Grandfather     Stroke Paternal Grandfather     Hypertension Maternal Grandfather     Stroke Maternal Grandfather        Social History     Social History    Marital status: SINGLE     Spouse name: N/A    Number of children: N/A    Years of education: N/A     Occupational History    Not on file. Social History Main Topics    Smoking status: Former Smoker     Quit date: 9/22/1994    Smokeless tobacco: Never Used    Alcohol use Yes      Comment: occasionally    Drug use: No    Sexual activity: Not Currently     Partners: Male     Birth control/ protection: Surgical     Other Topics Concern    Not on file     Social History Narrative         ALLERGIES: Pcn [penicillins]    Review of Systems   Constitutional: Negative for chills and fever. Respiratory: Negative for shortness of breath. Cardiovascular: Negative for chest pain. Gastrointestinal: Negative for diarrhea, nausea and vomiting. Neurological: Negative for headaches. All other systems reviewed and are negative. Vitals:    10/16/17 1144 10/16/17 1154 10/16/17 1309 10/16/17 1330   BP: 99/79 94/58 97/57 94/59   Pulse: 79 78 83 80   Resp: 18 15 28 18   Temp: 98.8 °F (37.1 °C)      SpO2: 99% 98% 97% 97%   Weight: 110.7 kg (244 lb)      Height: 5' 3\" (1.6 m)               Physical Exam   Constitutional: She is oriented to person, place, and time. She appears well-developed and well-nourished. No distress. HENT:   Head: Normocephalic and atraumatic. Mouth/Throat: Oropharynx is clear and moist.   Eyes: Conjunctivae and EOM are normal. Right eye exhibits no discharge. Left eye exhibits no discharge. No scleral icterus. Neck: Normal range of motion. Neck supple. No tracheal deviation present. Cardiovascular: Normal rate, regular rhythm and normal heart sounds. No murmur heard. Pulmonary/Chest: Effort normal and breath sounds normal. No respiratory distress.  She has no wheezes. She has no rales. Abdominal: Soft. She exhibits no distension. There is no tenderness. There is no rebound and no guarding. Musculoskeletal: Normal range of motion. She exhibits no edema or deformity. Neurological: She is alert and oriented to person, place, and time. No cranial nerve deficit. Skin: Skin is warm and dry. She is not diaphoretic. Psychiatric: She has a normal mood and affect. Her behavior is normal. Judgment and thought content normal.   Nursing note and vitals reviewed. MDM  Number of Diagnoses or Management Options  Diagnosis management comments: Pt with likely TIA now completely resolved with NIHSS= 0.  Last seen normal 11:10am.  Pt feels well. Discussed with Patronus, CT done. Abnormality on CT head, uncertain if mets vs. Acute infarct. No bleed. ASA ordered, stat MRI to be done to determine if acute infarct. Pt is comfortable with plan.     2:29 PM  Discussed case with radiology, looks like meningocarcinomatosis. Pt has not been eating or drinking well due to low appetite, now hypotensive. Daughter likely does not want any more invasive procedures including LP. Pt lives alone without aid. Discussed with Dr. Louis Espinoza, he will admit the patient. Pt has been hypotensive, 2L bolus ordered and pt is responding well. [facility] with  [facility]. Lactate ordered. INpatient neurology paged for consult.      ED Course       Procedures    Vitals:  Patient Vitals for the past 12 hrs:   Temp Pulse Resp BP SpO2   10/16/17 1330 - 80 18 94/59 97 %   10/16/17 1309 - 83 28 97/57 97 %   10/16/17 1154 - 78 15 94/58 98 %   10/16/17 1144 98.8 °F (37.1 °C) 79 18 99/79 99 %         Medications ordered:   Medications   sodium chloride 0.9 % bolus infusion 1,000 mL (not administered)   aspirin (ASPIRIN) tablet 325 mg (325 mg Oral Given 10/16/17 1218)   gadobutrol (GADAVIST) contrast solution 10 mL (10 mL IntraVENous Given 10/16/17 1248)         Lab findings:  Recent Results (from the past 12 hour(s))   EKG, 12 LEAD, INITIAL    Collection Time: 10/16/17 12:01 PM   Result Value Ref Range    Ventricular Rate 78 BPM    Atrial Rate 78 BPM    P-R Interval 126 ms    QRS Duration 108 ms    Q-T Interval 408 ms    QTC Calculation (Bezet) 465 ms    Calculated P Axis 34 degrees    Calculated R Axis -45 degrees    Calculated T Axis 33 degrees    Diagnosis       Normal sinus rhythm  Left axis deviation  Minimal voltage criteria for LVH, may be normal variant  Poor R Wave Progression  Cannot rule out Anterior infarct , age undetermined  Abnormal ECG  When compared with ECG of 19-JUL-2017 14:25,  No significant change was found    Confirmed by Jc Tucker MD, ----- ([id number]) on 10/16/2017 2:31:34 PM     GLUCOSE, POC    Collection Time: 10/16/17 12:08 PM   Result Value Ref Range    Glucose (POC) 99 70 - 110 mg/dL   CBC WITH AUTOMATED DIFF    Collection Time: 10/16/17 12:11 PM   Result Value Ref Range    WBC 4.7 4.6 - 13.2 K/uL    RBC 4.82 4.20 - 5.30 M/uL    HGB 13.8 12.0 - 16.0 g/dL    HCT 42.5 35.0 - 45.0 %    MCV 88.2 74.0 - 97.0 FL    MCH 28.6 24.0 - 34.0 PG    MCHC 32.5 31.0 - 37.0 g/dL    RDW 27.0 (H) 11.6 - 14.5 %    PLATELET 486 (L) 924 - 420 K/uL    MPV 10.7 9.2 - 11.8 FL    NEUTROPHILS 86 (H) 40 - 73 %    LYMPHOCYTES 5 (L) 21 - 52 %    MONOCYTES 9 3 - 10 %    EOSINOPHILS 0 0 - 5 %    BASOPHILS 0 0 - 2 %    ABS. NEUTROPHILS 4.1 1.8 - 8.0 K/UL    ABS. LYMPHOCYTES 0.2 (L) 0.9 - 3.6 K/UL    ABS. MONOCYTES 0.4 0.05 - 1.2 K/UL    ABS. EOSINOPHILS 0.0 0.0 - 0.4 K/UL    ABS.  BASOPHILS 0.0 0.0 - 0.06 K/UL    DF AUTOMATED      RBC COMMENTS ANISOCYTOSIS  2+        RBC COMMENTS TARGET CELLS  1+       METABOLIC PANEL, BASIC    Collection Time: 10/16/17 12:11 PM   Result Value Ref Range    Sodium 139 136 - 145 mmol/L    Potassium 4.2 3.5 - 5.5 mmol/L    Chloride 101 100 - 108 mmol/L    CO2 29 21 - 32 mmol/L    Anion gap 9 3.0 - 18 mmol/L    Glucose 109 (H) 74 - 99 mg/dL    BUN 15 7.0 - 18 MG/DL    Creatinine 0. 78 0.6 - 1.3 MG/DL    BUN/Creatinine ratio 19 12 - 20      GFR est AA >60 >60 ml/min/1.73m2    GFR est non-AA >60 >60 ml/min/1.73m2    Calcium 7.8 (L) 8.5 - 10.1 MG/DL   PROTHROMBIN TIME + INR    Collection Time: 10/16/17 12:11 PM   Result Value Ref Range    Prothrombin time 17.2 (H) 11.5 - 15.2 sec    INR 1.5 (H) 0.8 - 1.2     URIC ACID    Collection Time: 10/16/17 12:11 PM   Result Value Ref Range    Uric acid 7.2 2.6 - 7.2 MG/DL       EKG interpretation by ED Physician:  1:46 PM NL sinus rhythm with a rate of 78bpm. Left axis deviation. No acute ACS. No Ectopy. Non specific T wave abnormality. X-Ray, CT or other radiology findings or impressions:  MRI BRAIN W WO CONT   Final Result   IMPRESSION:     1. Abnormal leptomeningeal enhancement predominating at the left cerebrum  consistent with carcinomatous meningitis. 2. 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 3. There is mild hydrocephalus relative to prior, presumably related to the  carcinomatosis. 4. 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 No gross extra osseous extension,  but is presumably developing at left frontal and superior orbital areas. 5. There is no infarct. 6. No metastatic lesion evident within brain parenchyma. 7. Information conveyed to the ED 10/16/2017 at 1355 hours. Discussed by me with  Dr. Zach Caban of the ED   CT HEAD WO CONT   Final Result   IMPRESSIONS:  1.     At the upper aspect of left frontal lobe and involving adjacent upper anterior  aspect of left parietal lobe, there is evidence of focal edema with effacement  of gyri and sulci but without definite hypodensity overlying cortex. The  findings may represent early acute infarction. However, blastic lesions in brain  cannot be excluded.     2. No evidence of intracranial hemorrhage.     3.   No evidence of midline shift.     4.  Evidence of new metastatic lesion in left anterior portion of frontal bone and  left parietal bone as described. 5.     MRI of brain, without and with gadolinium contrast, as soon as possible,  recommended       Progress notes, Consult notes or additional Procedure notes:   Consult:  Discussed care with Dr. Richard Corey teleneurology. Standard discussion; including history of patients chief complaint, available diagnostic results, and treatment course. Can not exclude a small amount of Subarachnoid blood on MRI. Recommends waiting for a formal MRI read to rule out Subarachnoid hemorrhage. 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 Dr. Reva Riddle paged again to discuss. 2:16 PM  Pt has been re-examined by Norman Bower MD. Pt is resting comfortably in bed. Pt was informed of their results. All questions and concerns discussed and answered. Reevaluation of patient:       Disposition:  Diagnosis: No diagnosis found. Disposition: admit    Follow-up Information     None            Patient's Medications   Start Taking    No medications on file   Continue Taking    B. INFANTIS-B. ANI-B. LONG-B.BIFI (PROBIOTIC 4X) 10-15 MG TBEC    Take  by mouth two (2) times a day. CHOLECALCIFEROL, VITAMIN D3, (VITAMIN D3) 2,000 UNIT TAB    Take  by mouth. CITALOPRAM (CELEXA) 20 MG TABLET    TAKE ONE TABLET BY MOUTH ONE TIME DAILY     FUROSEMIDE (LASIX) 40 MG TABLET    Take 1 Tab by mouth two (2) times a day. HYDROCODONE-ACETAMINOPHEN (NORCO) 5-325 MG PER TABLET    1-2 tabs every 4-6 hours as needed for pain    ISOSORBIDE MONONITRATE ER (IMDUR) 30 MG TABLET    TAKE ONE TABLET BY MOUTH IN THE MORNING     METOPROLOL TARTRATE (LOPRESSOR) 50 MG TABLET    TAKE ONE TABLET BY MOUTH TWICE DAILY     MULTIVIT-MINERALS/FERROUS FUM (MULTI VITAMIN PO)    Take  by mouth.     SPIRONOLACTONE (ALDACTONE) 50 MG TABLET    TAKE ONE TABLET BY MOUTH TWICE DAILY These Medications have changed    No medications on file   Stop Taking    No medications on file       Lena Maria acting as a scribe for and in the presence of Estee Parada MD      October 16, 2017 at 2:32 PM       Provider Attestation:      I personally performed the services described in the documentation, reviewed the documentation, as recorded by the scribe in my presence, and it accurately and completely records my words and actions.  October 16, 2017 at 2:32 PM - Estee Parada MD

## 2017-10-16 NOTE — IP AVS SNAPSHOT
[address] [address] Christin Riddle Patient: Joi Morocho MRN: [id number] :1955 Current Discharge Medication List  
  
START taking these medications Dose & Instructions Dispensing Information Comments Morning Noon Evening Bedtime  
 dexamethasone 2 mg tablet Commonly known as:  DECADRON Your last dose was: Your next dose is:    
   
   
 Dose:  2 mg Take 1 Tab by mouth two (2) times daily (with meals) for 14 days. 1 tab twice daily with food. Quantity:  27 Tab Refills:  0  
     
   
   
   
  
 lactobacillus sp. 50 billion cpu 50 billion cell -375 mg Cap capsule Commonly known as:  BIO-K PLUS Your last dose was: Your next dose is:    
   
   
 Dose:  1 Cap Take 1 Cap by mouth daily. Quantity:  30 Cap Refills:  0  
     
   
   
   
  
 raNITIdine 150 mg tablet Commonly known as:  ZANTAC Your last dose was: Your next dose is:    
   
   
 Dose:  150 mg Take 1 Tab by mouth two (2) times a day. Indications: Heartburn Quantity:  30 Tab Refills:  0  
     
   
   
   
  
 therapeutic multivitamin tablet Commonly known as:  [facility] Your last dose was: Your next dose is:    
   
   
 Dose:  1 Tab Take 1 Tab by mouth daily. Quantity:  30 Tab Refills:  0 CONTINUE these medications which have CHANGED Dose & Instructions Dispensing Information Comments Morning Noon Evening Bedtime  
 cholecalciferol (vitamin D3) 2,000 unit Tab Commonly known as:  VITAMIN D3 What changed:   
- how much to take - when to take this Your last dose was: Your next dose is:    
   
   
 Dose:  2000 Units Take 1 Tab by mouth daily. Quantity:  30 Tab Refills:  0  
     
   
   
   
  
 * citalopram 20 mg tablet Commonly known as:  Erinn Childress What changed:  Another medication with the same name was added. Make sure you understand how and when to take each. Your last dose was: Your next dose is: TAKE ONE TABLET BY MOUTH ONE TIME DAILY Quantity:  30 Tab Refills:  2  
     
   
   
   
  
 * citalopram 20 mg tablet Commonly known as:  Arvel Saltness What changed: You were already taking a medication with the same name, and this prescription was added. Make sure you understand how and when to take each. Your last dose was: Your next dose is:    
   
   
 Dose:  20 mg Take 1 Tab by mouth daily. Quantity:  30 Tab Refills:  0  
     
   
   
   
  
 * HYDROcodone-acetaminophen 5-325 mg per tablet Commonly known as:  Lenon Gauze What changed:  Another medication with the same name was added. Make sure you understand how and when to take each. Your last dose was: Your next dose is:    
   
   
 1-2 tabs every 4-6 hours as needed for pain Quantity:  30 Tab Refills:  0  
     
   
   
   
  
 * HYDROcodone-acetaminophen 5-325 mg per tablet Commonly known as:  Lenon Gauze What changed: You were already taking a medication with the same name, and this prescription was added. Make sure you understand how and when to take each. Your last dose was: Your next dose is:    
   
   
 Dose:  1 Tab Take 1 Tab by mouth every six (6) hours as needed. Max Daily Amount: 4 Tabs. Quantity:  12 Tab Refills:  0  
     
   
   
   
  
 metoprolol tartrate 25 mg tablet Commonly known as:  LOPRESSOR What changed:  See the new instructions. Your last dose was: Your next dose is:    
   
   
 Dose:  12.5 mg Take 0.5 Tabs by mouth two (2) times a day. Take 0.5 tabs twice daily Quantity:  60 Tab Refills:  0  
     
   
   
   
  
 * Notice: This list has 4 medication(s) that are the same as other medications prescribed for you.  Read the directions carefully, and ask your doctor or other care provider to review them with you. CONTINUE these medications which have NOT CHANGED Dose & Instructions Dispensing Information Comments Morning Noon Evening Bedtime MULTI VITAMIN PO Your last dose was: Your next dose is: Take  by mouth. Refills:  0 PROBIOTIC 4X 10-15 mg Tbec Generic drug:  B.infantis-B.ani-B.long-B.bifi Your last dose was: Your next dose is: Take  by mouth two (2) times a day. Refills:  0 STOP taking these medications   
 furosemide 40 mg tablet Commonly known as:  LASIX  
   
  
 isosorbide mononitrate ER 30 mg tablet Commonly known as:  IMDUR  
   
  
 spironolactone 50 mg tablet Commonly known as:  ALDACTONE Where to Get Your Medications Information on where to get these meds will be given to you by the nurse or doctor. ! Ask your nurse or doctor about these medications  
  cholecalciferol (vitamin D3) 2,000 unit Tab  
 citalopram 20 mg tablet  
 dexamethasone 2 mg tablet HYDROcodone-acetaminophen 5-325 mg per tablet HYDROcodone-acetaminophen 5-325 mg per tablet  
 lactobacillus sp. 50 billion cpu 50 billion cell -375 mg Cap capsule  
 metoprolol tartrate 25 mg tablet  
 raNITIdine 150 mg tablet  
 therapeutic multivitamin tablet

## 2017-10-16 NOTE — IP AVS SNAPSHOT
[address] Patient: Hiren Dasilva MRN: [id number] :1955 You are allergic to the following Allergen Reactions Pcn (Penicillins) Rash Immunizations Administered for This Admission Name Date Influenza Vaccine (Quad) PF 10/17/2017 Recent Documentation Height Weight Breastfeeding? BMI OB Status Smoking Status 1.6 m 113.5 kg No 44.34 kg/m2 Postmenopausal Former Smoker Emergency Contacts Name Discharge Info Relation Home Work Mobile Lissa Chua DISCHARGE CAREGIVER [3] Mother [14] [phone] HarshilLissa  Parent [1] [phone] About your hospitalization You were admitted on:  2017 You last received care in the:  [facility] [address] You were discharged on:  2017 Unit phone number:  [phone] Why you were hospitalized Your primary diagnosis was:  Tia (Transient Ischemic Attack) Your diagnoses also included:  Benign Hypertensive Heart Disease Without Heart Failure, Obesity, Malignant Neoplasm Of Female Breast (Hcc), Diabetes (Hcc), Hyperlipidemia Providers Seen During Your Hospitalizations Provider Role Specialty Primary office phone Clary Martinez MD Attending Provider Emergency Medicine [phone] Your Primary Care Physician (PCP) Primary Care Physician Office Phone Office Fax 1037 [address] [phone] Follow-up Information Follow up With Details Comments Contact Info Krissy Saldaña MD On 10/24/2017 @ 11:00 am 14 [facility] [address] [place] [address] 
[phone] Your Appointments 2017  8:00 AM EDT  
RADIATION ONCOLOGY with HBV RADIATION ONCOLOGY HBV RADIATION THERAPY ([facility]) [place] [address] [address] [address] [address] [address] [phone] This appointment time is simply a place gallego and may not reflect the true appointment time. If patient does not know the appointment time given to them at the time of scheduling, they should contact the Radiation Therapy department for detail. Thursday October 19, 2017  8:00 AM EDT  
RADIATION ONCOLOGY with HBV RADIATION ONCOLOGY HBV RADIATION THERAPY ([facility]) [place] [address] [address] [place] [facility] [address]  
[phone] This appointment time is simply a place gallego and may not reflect the true appointment time. If patient does not know the appointment time given to them at the time of scheduling, they should contact the Radiation Therapy department for detail. Friday October 20, 2017  8:00 AM EDT  
RADIATION ONCOLOGY with HBV RADIATION ONCOLOGY [facility] RADIATION THERAPY ([facility]) [facility] [address] [place] [address]  
[phone] This appointment time is simply a place gallego and may not reflect the true appointment time. If patient does not know the appointment time given to them at the time of scheduling, they should contact the Radiation Therapy department for detail. Monday October 23, 2017  8:00 AM EDT  
RADIATION ONCOLOGY with HBV RADIATION ONCOLOGY HBV RADIATION THERAPY ([facility]) [place] [address] [address] [place] [address]  
[phone] This appointment time is simply a place gallego and may not reflect the true appointment time. If patient does not know the appointment time given to them at the time of scheduling, they should contact the Radiation Therapy department for detail. Monday October 23, 2017 10:30 AM EDT ROUTINE CARE with Maryjo Schirmer, MD  
[facility] Main Office ([address]) 14 [facility] [address] [facility] [address] [phone] Tuesday October 24, 2017 11:00 AM EDT HOSPITAL FOLLOW-UP with Judson Bingham NP [facility] Main Office ([address]) [address] [place] [address]  
[phone] Current Discharge Medication List  
  
START taking these medications Dose & Instructions Dispensing Information Comments Morning Noon Evening Bedtime  
 dexamethasone 2 mg tablet Commonly known as:  DECADRON Your last dose was: Your next dose is:    
   
   
 Dose:  2 mg Take 1 Tab by mouth two (2) times daily (with meals) for 14 days. 1 tab twice daily with food. Quantity:  27 Tab Refills:  0  
     
   
   
   
  
 lactobacillus sp. 50 billion cpu 50 billion cell -375 mg Cap capsule Commonly known as:  BIO-K PLUS Your last dose was: Your next dose is:    
   
   
 Dose:  1 Cap Take 1 Cap by mouth daily. Quantity:  30 Cap Refills:  0  
     
   
   
   
  
 raNITIdine 150 mg tablet Commonly known as:  ZANTAC Your last dose was: Your next dose is:    
   
   
 Dose:  150 mg Take 1 Tab by mouth two (2) times a day. Indications: Heartburn Quantity:  30 Tab Refills:  0  
     
   
   
   
  
 therapeutic multivitamin tablet Commonly known as:  [facility] Your last dose was: Your next dose is:    
   
   
 Dose:  1 Tab Take 1 Tab by mouth daily. Quantity:  30 Tab Refills:  0 CONTINUE these medications which have CHANGED Dose & Instructions Dispensing Information Comments Morning Noon Evening Bedtime  
 cholecalciferol (vitamin D3) 2,000 unit Tab Commonly known as:  VITAMIN D3 What changed:   
- how much to take - when to take this Your last dose was: Your next dose is:    
   
   
 Dose:  2000 Units Take 1 Tab by mouth daily. Quantity:  30 Tab Refills:  0  
     
   
   
   
  
 * citalopram 20 mg tablet Commonly known as:  Kirk Flatter What changed:  Another medication with the same name was added. Make sure you understand how and when to take each. Your last dose was: Your next dose is: TAKE ONE TABLET BY MOUTH ONE TIME DAILY Quantity:  30 Tab Refills:  2  
     
   
   
   
  
 * citalopram 20 mg tablet Commonly known as:  Kirk Flatter What changed: You were already taking a medication with the same name, and this prescription was added. Make sure you understand how and when to take each. Your last dose was: Your next dose is:    
   
   
 Dose:  20 mg Take 1 Tab by mouth daily. Quantity:  30 Tab Refills:  0  
     
   
   
   
  
 * HYDROcodone-acetaminophen 5-325 mg per tablet Commonly known as:  Birda Windham What changed:  Another medication with the same name was added. Make sure you understand how and when to take each. Your last dose was: Your next dose is:    
   
   
 1-2 tabs every 4-6 hours as needed for pain Quantity:  30 Tab Refills:  0  
     
   
   
   
  
 * HYDROcodone-acetaminophen 5-325 mg per tablet Commonly known as:  Birda Windham What changed: You were already taking a medication with the same name, and this prescription was added. Make sure you understand how and when to take each. Your last dose was: Your next dose is:    
   
   
 Dose:  1 Tab Take 1 Tab by mouth every six (6) hours as needed. Max Daily Amount: 4 Tabs. Quantity:  12 Tab Refills:  0  
     
   
   
   
  
 metoprolol tartrate 25 mg tablet Commonly known as:  LOPRESSOR What changed:  See the new instructions. Your last dose was: Your next dose is:    
   
   
 Dose:  12.5 mg Take 0.5 Tabs by mouth two (2) times a day. Take 0.5 tabs twice daily Quantity:  60 Tab Refills:  0  
     
   
   
   
  
 * Notice:   This list has 4 medication(s) that are the same as other medications prescribed for you. Read the directions carefully, and ask your doctor or other care provider to review them with you. CONTINUE these medications which have NOT CHANGED Dose & Instructions Dispensing Information Comments Morning Noon Evening Bedtime MULTI VITAMIN PO Your last dose was: Your next dose is: Take  by mouth. Refills:  0 PROBIOTIC 4X 10-15 mg Tbec Generic drug:  B.infantis-B.ani-B.long-B.bifi Your last dose was: Your next dose is: Take  by mouth two (2) times a day. Refills:  0 STOP taking these medications   
 furosemide 40 mg tablet Commonly known as:  LASIX  
   
  
 isosorbide mononitrate ER 30 mg tablet Commonly known as:  IMDUR  
   
  
 spironolactone 50 mg tablet Commonly known as:  ALDACTONE Where to Get Your Medications Information on where to get these meds will be given to you by the nurse or doctor. ! Ask your nurse or doctor about these medications  
  cholecalciferol (vitamin D3) 2,000 unit Tab  
 citalopram 20 mg tablet  
 dexamethasone 2 mg tablet HYDROcodone-acetaminophen 5-325 mg per tablet HYDROcodone-acetaminophen 5-325 mg per tablet  
 lactobacillus sp. 50 billion cpu 50 billion cell -375 mg Cap capsule  
 metoprolol tartrate 25 mg tablet  
 raNITIdine 150 mg tablet  
 therapeutic multivitamin tablet Discharge Instructions Patient armband removed and shredded [email or web address] Activation Thank you for requesting access to [email or web address]. Please follow the instructions below to securely access and download your online medical record. [email or web address] allows you to send messages to your doctor, view your test results, renew your prescriptions, schedule appointments, and more. How Do I Sign Up? 1. In your internet browser, go to [email or web address]. 2. Click on the First Time User? Click Here link in the Sign In box. You will see the New Member Sign Up page. 3. Enter your [facility] Access Code exactly as it appears below. You will not need to use this code after youve completed the sign-up process. If you do not sign up before the expiration date, you must request a new code. Peoplefilter Technologyhart Access Code: Activation code not generated Current [facility] Status: Active (This is the date your CH4et access code will ) 4. Enter the last four digits of your Social Security Number (xxxx) and Date of Birth (mm/dd/yyyy) as indicated and click Submit. You will be taken to the next sign-up page. 5. Create a CH4et ID. This will be your [facility] login ID and cannot be changed, so think of one that is secure and easy to remember. 6. Create a [facility] password. You can change your password at any time. 7. Enter your Password Reset Question and Answer. This can be used at a later time if you forget your password. 8. Enter your e-mail address. You will receive e-mail notification when new information is available in [address] 9. Click Sign Up. You can now view and download portions of your medical record. 10. Click the Download Summary menu link to download a portable copy of your medical information. Additional Information If you have questions, please visit the Frequently Asked Questions section of the [facility] website at [email or web address] Remember, [facility] is NOT to be used for urgent needs. For medical emergencies, dial 911. DISCHARGE SUMMARY from Nurse The following personal items are in your possession at time of discharge: 
 
Dental Appliances: None Visual Aid: Glasses, Sent home Home Medications: None Jewelry: None Clothing: Shirt, Pants, Undergarments, Footwear, With patient Other Valuables: Cell Phone, At bedside, [address], [address] home (purse picked up by pts dgt) PATIENT INSTRUCTIONS: 
 
 After general anesthesia or intravenous sedation, for 24 hours or while taking prescription Narcotics: · Limit your activities · Do not drive and operate hazardous machinery · Do not make important personal or business decisions · Do  not drink alcoholic beverages · If you have not urinated within 8 hours after discharge, please contact your surgeon on call. Report the following to your surgeon: 
· Excessive pain, swelling, redness or odor of or around the surgical area · Temperature over 100.5 · Nausea and vomiting lasting longer than 4 hours or if unable to take medications · Any signs of decreased circulation or nerve impairment to extremity: change in color, persistent  numbness, tingling, coldness or increase pain · Any questions What to do at Home: 
Recommended activity: Activity as tolerated, If you experience any of the following symptoms numbness, vision changes, slurred speach, please follow up with Primary MD. 
 
 
*  Please give a list of your current medications to your Primary Care Provider. *  Please update this list whenever your medications are discontinued, doses are 
    changed, or new medications (including over-the-counter products) are added. *  Please carry medication information at all times in case of emergency situations. These are general instructions for a healthy lifestyle: No smoking/ No tobacco products/ Avoid exposure to second hand smoke Surgeon General's Warning:  Quitting smoking now greatly reduces serious risk to your health. Obesity, smoking, and sedentary lifestyle greatly increases your risk for illness A healthy diet, regular physical exercise & weight monitoring are important for maintaining a healthy lifestyle You may be retaining fluid if you have a history of heart failure or if you experience any of the following symptoms:  Weight gain of 3 pounds or more overnight or 5 pounds in a week, increased swelling in our hands or feet or shortness of breath while lying flat in bed. Please call your doctor as soon as you notice any of these symptoms; do not wait until your next office visit. Recognize signs and symptoms of STROKE: 
 
F-face looks uneven A-arms unable to move or move unevenly S-speech slurred or non-existent T-time-call 911 as soon as signs and symptoms begin-DO NOT go Back to bed or wait to see if you get better-TIME IS BRAIN. Warning Signs of HEART ATTACK Call 911 if you have these symptoms: 
? Chest discomfort. Most heart attacks involve discomfort in the center of the chest that lasts more than a few minutes, or that goes away and comes back. It can feel like uncomfortable pressure, squeezing, fullness, or pain. ? Discomfort in other areas of the upper body. Symptoms can include pain or discomfort in one or both arms, the back, neck, jaw, or stomach. ? Shortness of breath with or without chest discomfort. ? Other signs may include breaking out in a cold sweat, nausea, or lightheadedness. Don't wait more than five minutes to call [address]! Fast action can save your life. Calling 911 is almost always the fastest way to get lifesaving treatment. Emergency Medical Services staff can begin treatment when they arrive  up to an hour sooner than if someone gets to the hospital by car. The discharge information has been reviewed with the patient. The patient verbalized understanding. Discharge medications reviewed with the patient and appropriate educational materials and side effects teaching were provided. Discharge Instructions Attachments/References BREAST CANCER CARE (ENGLISH) ASPIRIN: HEART ATTACK AND STROKE PREVENTION (ENGLISH) TIA (TRANSIENT ISCHEMIC ATTACK) (ENGLISH) Discharge Orders None [facility] Announcement We are excited to announce that we are making your provider's discharge notes available to you in Applied NanoTools. You will see these notes when they are completed and signed by the physician that discharged you from your recent hospital stay. If you have any questions or concerns about any information you see in Applied NanoTools, please call the Health Information Department where you were seen or reach out to your Primary Care Provider for more information about your plan of care. Introducing \[facility]\"" & HEALTH SERVICES! Dear Devin Evangelista: 
Thank you for requesting a Applied NanoTools account. Our records indicate that you already have an active Applied NanoTools account. You can access your account anytime at [email or web address] Did you know that you can access your hospital and ER discharge instructions at any time in Applied NanoTools? You can also review all of your test results from your hospital stay or ER visit. Additional Information If you have questions, please visit the Frequently Asked Questions section of the Applied [employer] website at [email or web address] Remember, Applied NanoTools is NOT to be used for urgent needs. For medical emergencies, dial 911. Now available from your iPhone and Android! General Information Please provide this summary of care documentation to your next provider. Patient Signature:  ____________________________________________________________ Date:  ____________________________________________________________  
  
Yakut Pih Provider Signature:  ____________________________________________________________ Date:  ____________________________________________________________ More Information Breast Cancer: Care Instructions Your Care Instructions Breast cancer occurs when abnormal cells grow out of control in the breast. These cancer cells can spread within the breast, to nearby lymph nodes and other tissues, and to other parts of the body. Being treated for cancer can weaken your body, and you may feel very tired. Get the rest your body needs so you can feel better. Finding out that you have cancer is scary. You may feel many emotions and may need some help coping. Seek out family, friends, and counselors for support. You also can do things at home to make yourself feel better while you go through treatment. Call the [employer] MyAcademicProgram (4-998.551.9378) or visit its website at [address]. BizNet Software for more information. Follow-up care is a key part of your treatment and safety. Be sure to make and go to all appointments, and call your doctor if you are having problems. It's also a good idea to know your test results and keep a list of the medicines you take. How can you care for yourself at home? · Take your medicines exactly as prescribed. Call your doctor if you think you are having a problem with your medicine. You may get medicine for nausea and vomiting if you have these side effects. · Follow your doctor's instructions to relieve pain. Pain from cancer and surgery can almost always be controlled. Use pain medicine when you first notice pain, before it becomes severe. · Eat healthy food. If you do not feel like eating, try to eat food that has protein and extra calories to keep up your strength and prevent weight loss. Drink liquid meal replacements for extra calories and protein. Try to eat your main meal early. · Get some physical activity every day, but do not get too tired. Keep doing the hobbies you enjoy as your energy allows. · Do not smoke. Smoking can make your cancer worse. If you need help quitting, talk to your doctor about stop-smoking programs and medicines. These can increase your chances of quitting for good. · Take steps to control your stress and workload. Learn relaxation techniques. ¨ Share your feelings. Stress and tension affect our emotions.  By expressing your feelings to others, you may be able to understand and cope with them. ¨ Consider joining a support group. Talking about a problem with your spouse, a good friend, or other people with similar problems is a good way to reduce tension and stress. ¨ Express yourself through art. Try writing, crafts, dance, or art to relieve stress. Some dance, writing, or art groups may be available just for people who have cancer. ¨ Be kind to your body and mind. Getting enough sleep, eating a healthy diet, and taking time to do things you enjoy can contribute to an overall feeling of balance in your life and can help reduce stress. ¨ Get help if you need it. Discuss your concerns with your doctor or counselor. · If you are vomiting or have diarrhea: ¨ Drink plenty of fluids (enough so that your urine is light yellow or clear like water) to prevent dehydration. Choose water and other caffeine-free clear liquids. If you have kidney, heart, or liver disease and have to limit fluids, talk with your doctor before you increase the amount of fluids you drink. ¨ When you are able to eat, try clear soups, mild foods, and liquids until all symptoms are gone for 12 to 48 hours. Other good choices include dry toast, crackers, cooked cereal, and gelatin dessert, such as Jell-O. · If you have not already done so, prepare a list of advance directives. Advance directives are instructions to your doctor and family members about what kind of care you want if you become unable to speak or express yourself. When should you call for help? Call your doctor now or seek immediate medical care if: 
· You have a fever. · Any part of your breast becomes red, tender, swollen, or hot. · You have pain, redness, or swelling in the arm on the same side as your breast cancer. Watch closely for changes in your health, and be sure to contact your doctor if: 
· You have pain that is not controlled by medicine. · You have nausea or vomiting. · You are constipated or have diarrhea. Where can you learn more? Go to http://lis-barry.info/. Enter V321 in the search box to learn more about \"Breast Cancer: Care Instructions. \" Current as of: July 26, 2016 Content Version: 11.3 © 4728-9780 DecideQuick. Care instructions adapted under license by Inadco (which disclaims liability or warranty for this information). If you have questions about a medical condition or this instruction, always ask your healthcare professional. [place] [address] any warranty or liability for your use of this information. Taking Aspirin to Prevent Heart Attack and Stroke: Care Instructions Your Care Instructions Aspirin acts as a \"blood thinner. \" It prevents blood clots from forming. When taken during and after a heart attack, it can reduce your chance of dying. And it's used if you have a stent in your coronary artery. Also, aspirin helps certain people lower their risk of a heart attack or stroke. Be sure you know what dose of aspirin to take and how often to take it. Low-dose aspirin is typically 81 mg. But the dose for daily aspirin can range from 81 mg to 325 mg. Taking aspirin every day can cause bleeding. It may not be safe if you have stomach ulcers. And it may not be safe if you have high blood pressure that is not controlled. If you take aspirin pills every day, do not take ones that have other ingredients such as caffeine or sodium. Before you start to take aspirin, tell your doctor all the medicines, vitamins, herbal products, and supplements you take. Follow-up care is a key part of your treatment and safety. Be sure to make and go to all appointments, and call your doctor if you are having problems. It's also a good idea to know your test results and keep a list of the medicines you take. How can you care for yourself at home? · Take aspirin with a full glass of water unless your doctor tells you not to. Do not lie down right after you take it. · If you have a stent in your coronary artery, take your aspirin as your heart doctor says to. If another doctor says to stop taking the aspirin for any reason, talk to your heart doctor before you stop. · Do not chew or crush the coated or sustained-release forms of aspirin. · Ask your doctor if you can drink alcohol while you take aspirin. And ask how much you can drink. Too much alcohol with aspirin can cause stomach bleeding. · Do not take aspirin if you are pregnant, unless your doctor says it is okay. · Keep all aspirin out of children's reach. · Throw aspirin away if it starts to smell like vinegar. · Do not take aspirin if you have gout or if you take prescription blood thinners, unless your doctor has told you to. · Do not take prescription or over-the-counter medicines, vitamins, herbal products, or supplements without talking to your doctor first. Love Kraftara the label before you take another over-the-counter medicine. Many contain aspirin. So they could cause you to take too much aspirin. · Talk with your doctor before you take a pain medicine. Ask which type of medicine you can take and how to take it safely with aspirin. · Tell your doctor or dentist before a surgery or procedure that you take aspirin. He or she will tell you if you should stop taking aspirin before your surgery or procedure. Make sure that you understand exactly what your doctor wants you to do. Where can you learn more? Go to http://lis-barry.info/. Enter H585 in the search box to learn more about \"Taking Aspirin to Prevent Heart Attack and Stroke: Care Instructions. \" Current as of: April 3, 2017 Content Version: 11.3 © 5164-4172 [place].  Care instructions adapted under license by "[employer] (which disclaims liability or warranty for this information). If you have questions about a medical condition or this instruction, always ask your healthcare professional. [address] any warranty or liability for your use of this information. Transient Ischemic Attack: Care Instructions Your Care Instructions A transient ischemic attack (TIA) is when blood flow to a part of your brain is blocked for a short time. A TIA is like a stroke but usually lasts only a few minutes. A TIA does not cause lasting brain damage. Any vision problems, slurred speech, or other symptoms usually go away in 10 to 20 minutes. But they may last for up to 24 hours. TIAs are often warning signs of a stroke. Some people who have a TIA may have a stroke in the future. A stroke can cause symptoms like those of a TIA. But a stroke causes lasting damage to your brain. You can take steps to help prevent a stroke. One thing you can do is get early treatment. If you have other new symptoms, or if your symptoms do not get better, go back to the emergency room or call your doctor right away. Getting treatment right away may prevent long-term brain damage caused by a stroke. The doctor has checked you carefully, but problems can develop later. If you notice any problems or new symptoms, get medical treatment right away. Follow-up care is a key part of your treatment and safety. Be sure to make and go to all appointments, and call your doctor if you are having problems. It's also a good idea to know your test results and keep a list of the medicines you take. How can you care for yourself at home? Medicines · Be safe with medicines. Take your medicines exactly as prescribed. Call your doctor if you think you are having a problem with your medicine. · If you take a blood thinner, such as aspirin, be sure you get instructions about how to take your medicine safely. Blood thinners can cause serious bleeding problems. · Call your doctor if you are not able to take your medicines for any reason. · Do not take any over-the-counter medicines or herbal products without talking to your doctor first. 
· If you take birth control pills or hormone therapy, talk to your doctor. Ask if these treatments are right for you. Lifestyle changes · Do not smoke. If you need help quitting, talk to your doctor about stop-smoking programs and medicines. · Be active. If your doctor recommends it, get more exercise. Walking is a good choice. Bit by bit, increase the amount you walk every day. Try for at least 30 minutes on most days of the week. You also may want to swim, bike, or do other activities. · Eat heart-healthy foods. These include fruits, vegetables, high-fiber foods, fish, and foods that are low in sodium, saturated fat, and trans fat. · Stay at a healthy weight. Lose weight if you need to. · Limit alcohol to 2 drinks a day for men and 1 drink a day for women. Staying healthy · Manage other health problems such as diabetes, high blood pressure, and high cholesterol. · Get the flu vaccine every year. When should you call for help? Call 911 anytime you think you may need emergency care. For example, call if: 
· You have new or worse symptoms of a stroke. These may include: 
¨ Sudden numbness, tingling, weakness, or loss of movement in your face, arm, or leg, especially on only one side of your body. ¨ Sudden vision changes. ¨ Sudden trouble speaking. ¨ Sudden confusion or trouble understanding simple statements. ¨ Sudden problems with walking or balance. ¨ A sudden, severe headache that is different from past headaches. Call 911 even if these symptoms go away in a few minutes. · You feel like you are having another TIA. Watch closely for changes in your health, and be sure to contact your doctor if you have any problems. Where can you learn more? Go to http://lis-barry.info/. Enter [phone] in the search box to learn more about \"Transient Ischemic Attack: Care Instructions. \" Current as of: March 20, 2017 Content Version: 11.3 © 2281-2194 [employer]. Care instructions adapted under license by [employer] (which disclaims liability or warranty for this information). If you have questions about a medical condition or this instruction, always ask your healthcare professional. [place] [address] any warranty or liability for your use of this information.

## 2017-10-16 NOTE — ED NOTES
Purposeful rounding completed:    Side rails up x 2:  YES  Bed in low position and wheels locked: YES  Call bell within reach: YES  Comfort addressed: YES    Toileting needs addressed: YES  Plan of care reviewed/updated with patient and or family members: YES  IV site assessed: YES  Pain assessed and addressed: YES, 0    Patient resting comfortably. No needs at this time.   Still awaiting bed assignment for [facility]

## 2017-10-16 NOTE — ED NOTES
Patient returned from MRI scan. Reconnected to cardiac monitor and continuous pulse ox. Call bell within reach. Siderails raised x 2. Family at bedside. Neuro assessment completed.

## 2017-10-16 NOTE — ED NOTES
Hourly rounding:  Patient resting comfortably. VS stable. Bedrails raised x 2. Call bell within reach. No other needs at this time.

## 2017-10-16 NOTE — ROUTINE PROCESS
TRANSFER - OUT REPORT:    Verbal report given to Mykel Berumen RN(name) on Jose Dominguez  [facility]       Report consisted of patients Situation, Background, Assessment and   Recommendations(SBAR). Information from the following report(s) SBAR, ED Summary, Procedure Summary, Intake/Output, MAR, Accordion, Recent Results, Med Rec Status, Cardiac Rhythm NSR and Alarm Parameters  was reviewed with the receiving nurse. Lines:   Peripheral IV 10/16/17 Left Antecubital (Active)   Site Assessment Clean, dry, & intact 10/16/2017 12:11 PM   Phlebitis Assessment 0 10/16/2017 12:11 PM   Infiltration Assessment 0 10/16/2017 12:11 PM   Dressing Status Clean, dry, & intact; Occlusive 10/16/2017 12:11 PM   Dressing Type Tape;Transparent 10/16/2017 12:11 PM   Hub Color/Line Status Patent; Flushed;Capped;Pink 10/16/2017 12:11 PM   Action Taken Blood drawn 10/16/2017 12:11 PM        Opportunity for questions and clarification was provided.       Patient transported with:   Monitor

## 2017-10-16 NOTE — ED TRIAGE NOTES
While being seen at Oncologists office, patient developed onset of slurred speech and facial droop.   Symptoms have resolved prior to arrival

## 2017-10-16 NOTE — ED NOTES
Hourly rounding: Family at bedside. ISTAT lactic completed and IV fluids started for low BP per MD orders. Patient c/o heartburn and requesting medication. MD aware. Call bell within reach. Awaiting further MD orders.

## 2017-10-17 NOTE — ROUTINE PROCESS
Bedside, Verbal and Written shift change report given to ONEIL Farmer RN (oncoming nurse) by Reilly demarco. Report included the following information SBAR, Kardex, and MAR. Hourly rounding and  chart checks completed.

## 2017-10-17 NOTE — ROUTINE PROCESS
Bedside, Verbal and Written shift change report given to ONEIL Mayfield RN (oncoming nurse) by Reilly demarco. Report included the following information SBAR, Kardex, and MAR. Hourly rounding and  chart checks completed.

## 2017-10-17 NOTE — PROGRESS NOTES
Problem: Mobility Impaired (Adult and Pediatric)  Goal: *Acute Goals and Plan of Care (Insert Text)  Outcome: Resolved/Met Date Met:  10/17/17  PHYSICAL THERAPY EVALUATION & DISCHARGE     Patient: Chidi Mooney (73 y.o. female)  Date: 10/17/2017  Primary Diagnosis: TIA (transient ischemic attack)        Precautions: None         ASSESSMENT AND RECOMMENDATIONS:  Patient is 57yo F admitted to hospital for possible TIA with MRI negative for acute CVA. PMH includes Stage 4 BC with mets to liver, spine, and meninges of brain. Patient presents today alert and agreeable to therapy and transferred to sitting EOB for objective assessment with no reports of pain with testing. Patient then stood and ambulated 200ft independently and then navigated 4 steps with HR at modified independently. Patient denied chest pain and took 15sec standing rest break before continuing back to room. Patient left resting in locked recliner with call bell by her side. As patient is functioning at modified independent, skilled physical therapy is not indicated at this time. Would recommend continued Home [facility] or OP PT for improved endurance as patient tolerates. Patient agreeable to D/C from PT at this level of care. Discharge Recommendations: Home Health  Further Equipment Recommendations for Discharge: N/A        G-:CODES      Mobility  Current  CI= 1-19%   Goal  CI= 1-19%  D/C  CI= 1-19%. The severity rating is based on the Level of Assistance required for Functional Mobility and ADLs.          Evaluation Complexity      Eval Complexity: History: MEDIUM  Complexity : 1-2 comorbidities / personal factors will impact the outcome/ POC Exam:LOW Complexity : 1-2 Standardized tests and measures addressing body structure, function, activity limitation and / or participation in recreation  Presentation: LOW Complexity : Stable, uncomplicated  Clinical Decision Making:Low Complexity   Overall Complexity:LOW       SUBJECTIVE: Patient stated I'd like to sit up for a little bit.       OBJECTIVE DATA SUMMARY:       Past Medical History:   Diagnosis Date    Abnormal nuclear cardiac imaging test 9/22/2014     exertional chest tightness foe a few weeks progressively worse associated with sob     Arthritis      Asthma       Over 1 yr last problem with asthma had to use inhaler    Benign hypertensive heart disease without heart failure 9/22/2014     exertional chest tightness foe a few weeks progressively worse associated with sob     Breast cancer ([address] 2014     right    Cancer ([address] 12/5/14    Cancer ([address] 4-4-16     lymph nodes    Cardiomegaly 9/22/2014     exertional chest tightness foe a few weeks progressively worse associated with sob     Congestive heart failure, unspecified      Depression 01/1989    GERD (gastroesophageal reflux disease)      GERD (gastroesophageal reflux disease)      Heart failure (HCC)      Hypertension      Intermediate coronary syndrome ([address] 9/22/2014     increasing chest tightness and sob     Irregular heart beat       Palpitations    Obesity, unspecified 9/22/2014     exertional chest tightness foe a few weeks progressively worse associated with sob     Other and unspecified angina pectoris 9/22/2014     exertional chest tightness foe a few weeks progressively worse associated with sob     Other and unspecified angina pectoris 9/22/2014     exertional chest tightness foe a few weeks progressively worse associated with sob     Radiation therapy complication      Sleep apnea       cpap    Unspecified sleep apnea       cpap     Past Surgical History:   Procedure Laterality Date    BIOPSY OF BREAST, INCISIONAL         LT BENIGN, 15 YRS.  AGO    CARDIAC SURG PROCEDURE UNLIST        HX BREAST BIOPSY Left      HX BREAST BIOPSY Right 12/5/2014     RIGHT BREAST BIOPSY WITH NEEDLE LOCALIZATION ULTRASOUND TIMES TWO performed by Roselyn London MD at [address] [place] [address] HX BREAST BIOPSY Right 2/11/2015     RIGHT BREAST EXPLORATION performed by Jonathan Blanodn MD at [address] HX BUNIONECTOMY Right      HX CATARACT REMOVAL Bilateral      HX CATARACT REMOVAL        HX COLONOSCOPY        HX GYN         Laparoscopy    HX MASTECTOMY Right 2/4/2015     RIGHT PARTIAL MASTECTOMY/SENTINEL LYMPH NODE BIOPSY/POSSIBLE AXILLARY LYMPH NODE DISECTION performed by Jonathan Blandon MD at [facility] MAIN OR    HX MASTECTOMY Right 3/25/2016     RIGHT AXILLARY LYMPH NODE DISECTION/RIGHT PARTIAL MASTECTOMY performed by Jonathan Blandon MD at [facility] MAIN OR    HX ORTHOPAEDIC Right       bunionectomy    HX VASCULAR ACCESS   2016     mediport    ND INSJ PRPH CTR VAD W/SUBQ PORT AGE 5 YR/> N/A 07/24/2017     Dr. Paz Chua TUNNELED CTR VAD W/SUBQ PORT AGE 5 YR/>   5-4-16     Dr. Lynn Lindsey VAD W/SUBQ PORT/ CTR/PRPH INSJ N/A 05/01/2017     Dr. Jason Atkinson     Barriers to Learning/Limitations: None  Compensate with: visual, verbal, tactile, kinesthetic cues/model  Prior Level of Function/Home Situation: Patient lives alone in 1 story home with 4STE with HR and was independent with mobility and I/ADL's PTA. Patient has RW, Rollator, SPC. Home Situation  Home Environment: Private residence  # Steps to Enter: 3  One/Two Story Residence: One story  Living Alone: No  Support Systems: Child([place]), Parent  Patient Expects to be Discharged to[de-identified] Private residence  Current DME Used/Available at Home: None  Critical Behavior:  Neurologic State: Alert  Orientation Level: Oriented X4  Cognition: Follows commands   Strength:    Strength:  Within functional limits (BLE)   Tone & Sensation:   Tone: Normal (BLE)   Sensation: Intact (BLE)   Range Of Motion:  AROM: Within functional limits (BLE)   Functional Mobility:  Bed Mobility:  Rolling: Modified independent  Supine to Sit: Modified independent   Scooting: Modified independent  Transfers:  Sit to Stand: Modified independent  Stand to Sit: Modified independent Balance:   Sitting: Intact  Standing: Intact  Ambulation/Gait Training:  Distance (ft): 200 Feet (ft)  Assistive Device:  (None)  Ambulation - Level of Assistance: Independent   Base of Support: Widened   Speed/Natali: Slow   Interventions: Verbal cues     Pain:  Pt reports 0/10 pain or discomfort prior to treatment. Pt reports 0/10 pain or discomfort post treatment. Activity Tolerance:   Patient tolerated activity well and denied dizziness, chest pain, and endorsed minimal SOB s/p stair navigation that resolved with standing rest break apprx 15 seconds. Please refer to the flowsheet for vital signs taken during this treatment. After treatment:   [X]         Patient left in no apparent distress sitting up in chair  [ ]         Patient left in no apparent distress in bed  [X]         Call bell left within reach  [ ]         Nursing notified  [ ]         Caregiver present  [ ]         Bed alarm activated      COMMUNICATION/EDUCATION:   [X]         Fall prevention education was provided and the patient/caregiver indicated understanding. [X]         Patient/family have participated as able in goal setting and plan of care. [X]         Patient/family agree to work toward stated goals and plan of care. [ ]         Patient understands intent and goals of therapy, but is neutral about his/her participation. [ ]         Patient is unable to participate in goal setting and plan of care.      Thank you for this referral.  Dottie Gomez PT   Time Calculation: 15 mins

## 2017-10-17 NOTE — CONSULTS
Kia Eddy is a 59 y.o., right handed female, with an established history of metastatic breast cancer stage IV, with established metastases to the liver spine and skull base, atrial fibrillation, dyslipidemia, hypertension, who comes in with slurred speech. She was noted to develop stroke slurred speech as well as some asymmetry of her face lasting upwards of 5 minutes. With that this she came to the emergency room for further evaluation. Her symptoms did not last very long and she had complete resolution. She states that she did not have any weakness in her arms or legs. She denies any sensory changes. She denies any visual obscuration. She does not have a headache. She was discovered to have a significantly abnormal MRI scan which was acquired at the emergency department. Social History; the patient is a former smoker. She quit many years ago. Has an occasional glass of wine. No illicit drug use. Family History; mother had osteoarthritis and hypertension has since passed. Father had a history of osteoarthritis heart disease hypertension stroke and pulmonary disease also .     Current Facility-Administered Medications   Medication Dose Route Frequency Provider Last Rate Last Dose    therapeutic multivitamin (THERAGRAN) tablet 1 Tab  1 Tab Oral DAILY Anette Miller MD        HYDROcodone-acetaminophen [facility]) 5-325 mg per tablet 1 Tab  1 Tab Oral Q6H PRN Anette Miller MD        isosorbide mononitrate ER (IMDUR) tablet 30 mg  30 mg Oral DAILY Anette Miller MD        citalopram (CELEXA) tablet 20 mg  20 mg Oral DAILY Anette Miller MD        spironolactone (ALDACTONE) tablet 50 mg  50 mg Oral BID Anette Miller MD        furosemide (LASIX) tablet 40 mg  40 mg Oral BID Anette Mliler MD        metoprolol tartrate (LOPRESSOR) tablet 50 mg  50 mg Oral BID Anette Miller MD        lactobacillus sp. 50 billion cpu (BIO-K PLUS) capsule 1 Cap  1 Cap Oral DAILY Anette Miller MD  enoxaparin (LOVENOX) injection 40 mg  40 mg SubCUTAneous Q24H Connor Hillman MD   40 mg at 10/17/17 0106    influenza vaccine 2017-18 (3 yrs+)(PF) (FLUZONE QUAD/FLUARIX QUAD) injection 0.5 mL  0.5 mL IntraMUSCular PRIOR TO DISCHARGE Connor Hillman MD        raNITIdine (ZANTAC) tablet 150 mg  150 mg Oral BID Rita Stringer MD   150 mg at 10/16/17 8554       Past Medical History:   Diagnosis Date    Abnormal nuclear cardiac imaging test 9/22/2014    exertional chest tightness foe a few weeks progressively worse associated with sob     Arthritis     Asthma     Over 1 yr last problem with asthma had to use inhaler    Benign hypertensive heart disease without heart failure 9/22/2014    exertional chest tightness foe a few weeks progressively worse associated with sob     Breast cancer ([address] 2014    right    Cancer ([address] 12/5/14    Cancer ([facility] 4-4-16    lymph nodes    Cardiomegaly 9/22/2014    exertional chest tightness foe a few weeks progressively worse associated with sob     Congestive heart failure, unspecified     Depression 01/1989    GERD (gastroesophageal reflux disease)     GERD (gastroesophageal reflux disease)     Heart failure ([address]     Hypertension     Intermediate coronary syndrome ([address] 9/22/2014    increasing chest tightness and sob     Irregular heart beat     Palpitations    Obesity, unspecified 9/22/2014    exertional chest tightness foe a few weeks progressively worse associated with sob     Other and unspecified angina pectoris 9/22/2014    exertional chest tightness foe a few weeks progressively worse associated with sob     Other and unspecified angina pectoris 9/22/2014    exertional chest tightness foe a few weeks progressively worse associated with sob     Radiation therapy complication     Sleep apnea     cpap    Unspecified sleep apnea     cpap       Past Surgical History:   Procedure Laterality Date    BIOPSY OF BREAST, INCISIONAL      LT BENIGN, [address]    CARDIAC SURG PROCEDURE UNLIST      HX BREAST BIOPSY Left     HX BREAST BIOPSY Right 12/5/2014    RIGHT BREAST BIOPSY WITH NEEDLE LOCALIZATION ULTRASOUND TIMES TWO performed by Alba Montoya MD at [facility] MAIN OR    HX BREAST BIOPSY Right 2/11/2015    RIGHT BREAST EXPLORATION performed by Alba Montoya MD at [address] [place] [address] HX BUNIONECTOMY Right     HX CATARACT REMOVAL Bilateral     HX CATARACT REMOVAL      HX COLONOSCOPY      HX GYN      Laparoscopy    HX MASTECTOMY Right 2/4/2015    RIGHT PARTIAL MASTECTOMY/SENTINEL LYMPH NODE BIOPSY/POSSIBLE AXILLARY LYMPH NODE DISECTION performed by Alba Montoya MD at [facility] MAIN OR    HX MASTECTOMY Right 3/25/2016    RIGHT AXILLARY LYMPH NODE DISECTION/RIGHT PARTIAL MASTECTOMY performed by Alba Montoya MD at [facility] MAIN OR    HX ORTHOPAEDIC Right     bunionectomy    HX VASCULAR ACCESS  2016    mediport    OH INSJ PRPH CTR VAD W/SUBQ PORT AGE 5 YR/> N/A 07/24/2017    Dr. Toan Morales TUNNELED CTR VAD W/SUBQ PORT AGE 5 YR/>  5-4-16    Dr. Sage Hill VAD W/SUBQ PORT/ CTR/PRPH INSJ N/A 05/01/2017    Dr. Pearl Cherry Log       Allergies   Allergen Reactions    Pcn [Penicillins] Rash       Patient Active Problem List   Diagnosis Code    Retinal detachment H33.20    Angina decubitus ([facility] I20.8    Benign hypertensive heart disease without heart failure I11.9    Cardiomegaly I51.7    Abnormal nuclear cardiac imaging test R93.1    Obesity E66.9    VIRGINIE on CPAP G47.33, Z99.89    Edema R60.9    Personal history of malignant neoplasm of breast Z85.3    Malignant neoplasm of female breast ([address] C50.919    Diabetes ([address] E11.9    Hyperlipidemia E78.5    Metastatic breast cancer ([address] C50.919    TIA (transient ischemic attack) G45.9         Review of Systems: She actually feels quite well at this time, and is back to her baseline.   As above otherwise 11 point review of systems negative including;   Constitutional no fever or chills  Skin denies rash or itching  HENT  Denies tinnitus, hearing lose  Eyes denies diplopia vision lose  Respiratory denies shortness of breath  Cardiovascular denies chest pain, dyspnea on exertion  Gastrointestinal denies nausea, vomiting, diarrhea, constipation  Genitourinary denies incontinence  Musculoskeletal denies joint pain or swelling  Endocrine denies weight change  Hematology denies easy bruising or bleeding   Neurological as above in HPI      PHYSICAL EXAMINATION:      VITAL SIGNS:    Visit Vitals    BP 91/56 (BP 1 Location: Left arm, BP Patient Position: At rest)    Pulse 85    Temp 97.9 °F (36.6 °C)    Resp 16    Ht 5' 3\" (1.6 m)    Wt 113.5 kg (250 lb 4.8 oz)    LMP 05/01/1993    SpO2 96%    Breastfeeding No    BMI 44.34 kg/m2       GENERAL: The patient is well developed, well nourished, and in no apparent distress. EXTREMITIES: No clubbing, cyanosis, or edema is identified. Pulses 2+ and symmetrical.  Muscle tone is normal.  HEAD:   Ear, nose, and throat appear to be without trauma. The patient is normocephalic. NEUROLOGIC EXAMINATION    MENTAL STATUS: The patient is awake, alert, and oriented x 4. Fund of knowledge is adequate. Speech is fluent and memory appears to be intact, both long and short term. CRANIAL NERVES: II  Visual fields are full to confrontation. Funduscopic examination reveals flat disks bilaterally. Pupils are both 3 mm and briskly reactive to light and accommodation. III, IV, VI  Extraocular movements are intact and there is no nystagmus. V  Facial sensation is intact to pinprick and light touch. VII  Face is symmetrical.   VIII - Hearing is present. IX, X, [address] rises symmetrically. Gag is present. Tongue is in the midline. XI - Shoulder shrugging and head turning intact  MOTOR:  The patient is 5/5 in all four limbs without any drift. Fine finger movements are symmetrical.  Isolated motor group testing reveals no focal abnormalities.   Tone is normal.  Sensory examination is intact to pinprick, light touch and position sense testing. Reflexes are 2+ and symmetrical. Plantars are down going. Cerebellar examination reveals no gross ataxia or dysmetria. Gait is normal and the patient can tandem walk without any difficulty. Final result (Exam End: 10/16/2017 12:47 PM) Open    Study Result   MR brain without and with contrast     CPT code: 03853     History: Ataxia, concern for stroke with abnormal head CT; history of metastatic  breast cancer.     Comparison: Preceding head CT; prior MRI July 2017 and head CT June 2014; also  bone scan July 2017     Technique: Brain scanned with axial and sagittal T1W scans, axial T2W scans,  axial FLAIR, axial or coronal T2*GRE heme sensitive sequence, axial diffusion  weighted images, and post gadolinium axial and coronal T1W scans. Contrast used:  10 cc Gadavist     Findings:      Corresponding with CT discussion, there is asymmetric thickening and loss of  visualization of sulcation involving the left frontal lobe superiorly. Mild  serpentine bright FLAIR signal along the sulci. Diffusion imaging is without  restriction of this area. However, there is serpentine enhancement along the  gyri; rest appreciated on sagittal postcontrast images 3-8, but also evident on  coronal postcontrast images 11-17, and axial postcontrast imaging.     There are multiple enhancing lesions of the calvarium. The most apparent one is  the CT discussed focus measuring about 1.6 cm width. There is convex posterior  morphology with distortion of the inner table of the calvarium. Axial image 18,  sagittal images 7-9.     Other geographic regions of enhancement including involvement of the bilateral  orbital roofs, right more than left and scattered about within the calvarium. Reference postcontrast axial series 9. No acute infarction.  No acute hemorrhage.     There is mild increase in size of the ventricular system compared with prior  MRI. No transforaminal herniation or shift across the midline.     It is probable there is enhancement within the bilateral IACs. No expansion or  destruction.     Pituitary gland itself unremarkable but there is heterogeneous enhancement  involving the clivus.     There are appropriate flow voids within the major skull base vasculature.     The cervicomedullary junction is patent. However, there is abnormal surface  enhancement along the visible upper cervical cord.     There is less distinct resolution of the cortical margin of the orbital roof  right more than left but there is no gross intraorbital extension of tumor  infiltration. Best appreciated on coronal imaging.     Paranasal sinuses clear.            IMPRESSION  IMPRESSION:     1. Abnormal leptomeningeal enhancement predominating at the left cerebrum  consistent with carcinomatous meningitis. 2. 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 3. There is mild hydrocephalus relative to prior, presumably related to the  carcinomatosis. 4. 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 No gross extra osseous extension,  but is presumably developing at left frontal and superior orbital areas. 5. There is no infarct. 6. No metastatic lesion evident within brain parenchyma. 7. Information conveyed to the ED 10/16/2017 at 1355 hours. Discussed by me with  Dr. David Diaz of the ED.     Thank you for this referral.            I have reviewed the above imagines myself.        CBC:   Lab Results   Component Value Date/Time    WBC 3.8 10/17/2017 05:40 AM    RBC 4.13 10/17/2017 05:40 AM    HGB 12.3 10/17/2017 05:40 AM    HCT 36.7 10/17/2017 05:40 AM    PLATELET 86 50/71/2436 05:40 AM     BMP:   Lab Results   Component Value Date/Time    Glucose 93 10/17/2017 05:40 AM    Sodium 137 10/17/2017 05:40 AM    Potassium 4.5 10/17/2017 05:40 AM    Chloride 105 10/17/2017 05:40 AM    CO2 26 10/17/2017 05:40 AM    BUN 11 10/17/2017 05:40 AM    Creatinine 0.62 10/17/2017 05:40 AM    Calcium 7.2 10/17/2017 05:40 AM     CMP:   Lab Results   Component Value Date/Time    Glucose 93 10/17/2017 05:40 AM    Sodium 137 10/17/2017 05:40 AM    Potassium 4.5 10/17/2017 05:40 AM    Chloride 105 10/17/2017 05:40 AM    CO2 26 10/17/2017 05:40 AM    BUN 11 10/17/2017 05:40 AM    Creatinine 0.62 10/17/2017 05:40 AM    Calcium 7.2 10/17/2017 05:40 AM    Anion gap 6 10/17/2017 05:40 AM    BUN/Creatinine ratio 18 10/17/2017 05:40 AM    Alk. phosphatase 435 10/17/2017 05:40 AM    Protein, total 5.4 10/17/2017 05:40 AM    Albumin 1.9 10/17/2017 05:40 AM    Globulin 3.5 10/17/2017 05:40 AM    A-G Ratio 0.5 10/17/2017 05:40 AM     Coagulation:   Lab Results   Component Value Date/Time    Prothrombin time 17.2 10/16/2017 12:11 PM    INR 1.5 10/16/2017 12:11 PM    aPTT 26.1 05/02/2016 12:07 PM     Cardiac markers: No results found for: CPK, CKND1, IGNACIO       Impression: Patient with a history of well-established metastatic breast cancer now with an MRI scan that looks as if it has carcinomatous meningitis. She had transient slurred speech which is probably related to the carcinomatous meningitis. Plan: I have discussed the case with her oncologist . apparently she has end-stage diseaseDamle. And the discussion has been leaning more toward palliative care than towards aggressive therapy. If further diagnostic workup is warranted then a lumbar puncture would be necessary to confirm the diagnosis of carcinomatous meningitis. If more conservative approach is being taken to nothing further to add from a neurologic standpoint and will see only as needed. PLEASE NOTE:   This document has been produced using voice recognition software. Unrecognized errors in transcription may be present.

## 2017-10-17 NOTE — PROGRESS NOTES
Pt with conflicting activity orders. Please discontinue bedrest orders to facilitate participate in Physical Therapy evaluation and treatment.   Keyonna Lima, PTA

## 2017-10-17 NOTE — ROUTINE PROCESS
Primary Nurse Lorrie Snyder RN and EDI Contreras RN performed a dual skin assessment on this patient No impairment noted  Pardeep score is 22

## 2017-10-17 NOTE — DISCHARGE SUMMARY
[facility]  Discharge Summary       Patient: Nina Marr Age: 59 y.o. : 1955 MR#: [id number] SSN: [id number]  PCP on record: Brayan Watts MD  Admit date: 10/16/2017  Discharge date: 10/17/2017    Disposition:    []Home   [x]Home with Hospice   []SNF/NH   []Rehab   []Home with family   []Alternate Facility:____________________    Discharge Diagnoses:                             1. Slurred speech likely secondary to Acute transient ischemic attack  2. Malignant neoplasm of breast with metastatic disease to the liver, bone of spine, as well as calvarium:  3. Diabetes mellitus:  4. Hx of Dyslipidemia: .  5. Hypertension, benign essential:   6. Obesity: 113.5 kg. Discharge Medications:     Current Discharge Medication List      START taking these medications    Details   lactobacillus sp. 50 billion cpu (BIO-Well[employer] PLUS) 50 billion cell -375 mg cap capsule Take 1 Cap by mouth daily. Qty: 30 Cap, Refills: 0      therapeutic multivitamin (THERAGRAN) tablet Take 1 Tab by mouth daily. Qty: 30 Tab, Refills: 0      raNITIdine (ZANTAC) 150 mg tablet Take 1 Tab by mouth two (2) times a day. Indications: Heartburn  Qty: 30 Tab, Refills: 0      dexamethasone (DECADRON) 2 mg tablet Take 1 Tab by mouth two (2) times daily (with meals) for 14 days. 1 tab twice daily with food. Qty: 27 Tab, Refills: 0         CONTINUE these medications which have CHANGED    Details   cholecalciferol, vitamin D3, (VITAMIN D3) 2,000 unit tab Take 1 Tab by mouth daily. Qty: 30 Tab, Refills: 0      !! citalopram (CELEXA) 20 mg tablet Take 1 Tab by mouth daily. Qty: 30 Tab, Refills: 0      !! HYDROcodone-acetaminophen (NORCO) 5-325 mg per tablet Take 1 Tab by mouth every six (6) hours as needed. Max Daily Amount: 4 Tabs. Qty: 12 Tab, Refills: 0      metoprolol tartrate (LOPRESSOR) 50 mg tablet Take 0.5 Tabs by mouth two (2) times a day.   Qty: 60 Tab, Refills: 0       !! - Potential duplicate medications found. Please discuss with provider. CONTINUE these medications which have NOT CHANGED    Details   B.infantis-B.ani-B.long-B.bifi (PROBIOTIC 4X) 10-15 mg TbEC Take  by mouth two (2) times a day. !! citalopram (CELEXA) 20 mg tablet TAKE ONE TABLET BY MOUTH ONE TIME DAILY   Qty: 30 Tab, Refills: 2    Associated Diagnoses: Depression, unspecified depression type      !! HYDROcodone-acetaminophen (NORCO) 5-325 mg per tablet 1-2 tabs every 4-6 hours as needed for pain  Qty: 30 Tab, Refills: 0      MULTIVIT-MINERALS/FERROUS FUM (MULTI VITAMIN PO) Take  by mouth. !! - Potential duplicate medications found. Please discuss with provider. STOP taking these medications       furosemide (LASIX) 40 mg tablet Comments:   Reason for Stopping:         spironolactone (ALDACTONE) 50 mg tablet Comments:   Reason for Stopping:         isosorbide mononitrate ER (IMDUR) 30 mg tablet Comments:   Reason for Stopping:               Consults:  Neurology, Tele- Neurology, Heme/Oncology  -   Procedures: none  -     Significant Diagnostic Studies: MRI brain  -      Hospital Course by Problem   1. Slurred speech likely secondary to Acute transient ischemic attack: subsequent resolution of her symptoms within approximately 5 minutes. Neurology was consulted. MRI shows Abnormal leptomeningeal enhancement predominating at the left cerebrum consistent with carcinomatous meningitis. Recommend an LP. LP/agressive interventations not to be done per patient and daughter. Pt was alert and oriented x4. Pt and daughter plan for pt to go home with hospice care. Hospice was consulted. 2. Malignant neoplasm of breast with metastatic disease to the liver, bone of spine, as well as calvarium: Hem/onc was consulted - Prognosis poor, life expectancy few weeks. Low dose steroids x14 days with H2 blockers per heme/oncologist.  3. Diabetes mellitus: controlled while inpatient.    4. Hx of Dyslipidemia: lipid panel showed elevated LDL, follow up as outpatient if necessary. 5. Hypertension, benign essential: Pt was continued on Imdur and metoprolol, spironolactone while inpatient and recommended to Hold if SBP<90. Imdur, spironolactone and Lasix dc'd at discharge due to low BP. Continue low dose metoprolol at discharge. 6. Obesity: 113.5 kg.   7. 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 Today's examination of the patient revealed:     Subjective:   Pt states she had a mild HA today upon awakening but resolved after her breakfast. None currently.  She denies any fever, chills, N/D/V/C, vision changes, new speech changes  Objective:   VS:   Visit Vitals    /73 (BP 1 Location: Left arm, BP Patient Position: Sitting)    Pulse 94    Temp 97.4 °F (36.3 °C)    Resp 19    Ht 5' 3\" (1.6 m)    Wt 113.5 kg (250 lb 4.8 oz)    LMP 1993    SpO2 97%    Breastfeeding No    BMI 44.34 kg/m2      Tmax/24hrs: Temp (24hrs), Av.8 °F (36.6 °C), Min:97.4 °F (36.3 °C), Max:98.2 °F (36.8 °C)     Input/Output: No intake or output data in the 24 hours ending 10/17/17 1615    General:  Alert, NAD  Cardiovascular:  RRR  Pulmonary:  LSC throughout; respiratory effort WNL  GI:  +BS in all four quadrants, soft, non-tender  Extremities:  No edema; or cyanosis  Additional:      Labs:    Recent Results (from the past 24 hour(s))   GLUCOSE, POC    Collection Time: 10/16/17  9:24 PM   Result Value Ref Range    Glucose (POC) 122 (H) 70 - 349 mg/dL   METABOLIC PANEL, COMPREHENSIVE    Collection Time: 10/17/17  5:40 AM   Result Value Ref Range    Sodium 137 136 - 145 mmol/L    Potassium 4.5 3.5 - 5.5 mmol/L    Chloride 105 100 - 108 mmol/L    CO2 26 21 - 32 mmol/L    Anion gap 6 3.0 - 18 mmol/L    Glucose 93 74 - 99 mg/dL    BUN 11 7.0 - 18 MG/DL    Creatinine 0.62 0.6 - 1.3 MG/DL    BUN/Creatinine ratio 18 12 - 20      GFR est AA >60 >60 ml/min/1.73m2    GFR est non-AA >60 >60 ml/min/1.73m2    Calcium 7.2 (L) 8.5 - 10.1 MG/DL    Bilirubin, total 3.4 (H) 0.2 - 1.0 MG/DL    ALT (SGPT) 115 (H) 13 - 56 U/L    AST (SGOT) 167 (H) 15 - 37 U/L    Alk. phosphatase 435 (H) 45 - 117 U/L    Protein, total 5.4 (L) 6.4 - 8.2 g/dL    Albumin 1.9 (L) 3.4 - 5.0 g/dL    Globulin 3.5 2.0 - 4.0 g/dL    A-G Ratio 0.5 (L) 0.8 - 1.7     CBC W/O DIFF    Collection Time: 10/17/17  5:40 AM   Result Value Ref Range    WBC 3.8 (L) 4.6 - 13.2 K/uL    RBC 4.13 (L) 4.20 - 5.30 M/uL    HGB 12.3 12.0 - 16.0 g/dL    HCT 36.7 35.0 - 45.0 %    MCV 88.9 74.0 - 97.0 FL    MCH 29.8 24.0 - 34.0 PG    MCHC 33.5 31.0 - 37.0 g/dL    RDW 25.7 (H) 11.6 - 14.5 %    PLATELET 86 (L) 512 - 420 K/uL   LIPID PANEL    Collection Time: 10/17/17  5:40 AM   Result Value Ref Range    LIPID PROFILE          Cholesterol, total 185 <200 MG/DL    Triglyceride 54 <150 MG/DL    HDL Cholesterol 17 (L) 40 - 60 MG/DL    LDL, calculated 157.2 (H) 0 - 100 MG/DL    VLDL, calculated 10.8 MG/DL    CHOL/HDL Ratio 10.9 (H) 0 - 5.0     GLUCOSE, POC    Collection Time: 10/17/17  7:06 AM   Result Value Ref Range    Glucose (POC) 78 70 - 110 mg/dL   GLUCOSE, POC    Collection Time: 10/17/17 11:38 AM   Result Value Ref Range    Glucose (POC) 89 70 - 110 mg/dL   GLUCOSE, POC    Collection Time: 10/17/17  3:29 PM   Result Value Ref Range    Glucose (POC) 142 (H) 70 - 110 mg/dL     Additional Data Reviewed:     Condition:   Follow-up Appointments:   1. Your PCP: Carlos Alberto Melvin MD, within 7-10days  2. Your Hematologist/Oncologist within 10-14 days.       >30 minutes spent coordinating this discharge (review instructions/follow-up, prescriptions, preparing report for sign off)    Signed:  Merline Riser, PA-C  10/17/2017  4:15 PM

## 2017-10-17 NOTE — DISCHARGE INSTRUCTIONS
Patient armband removed and shredded    MyChart Activation    Thank you for requesting access to [email or web address]. Please follow the instructions below to securely access and download your online medical record. [employer] allows you to send messages to your doctor, view your test results, renew your prescriptions, schedule appointments, and more. How Do I Sign Up? 1. In your internet browser, go to [email or web address]. 2. Click on the First Time User? Click Here link in the Sign In box. You will see the New Member Sign Up page. 3. Enter your [employer] Access Code exactly as it appears below. You will not need to use this code after youve completed the sign-up process. If you do not sign up before the expiration date, you must request a new code. [employer] Access Code: Activation code not generated  Current [employer] Status: Active (This is the date your [employer] access code will )    4. Enter the last four digits of your Social Security Number (xxxx) and Date of Birth (mm/dd/yyyy) as indicated and click Submit. You will be taken to the next sign-up page. 5. Create a [employer] ID. This will be your [employer] login ID and cannot be changed, so think of one that is secure and easy to remember. 6. Create a [employer] password. You can change your password at any time. 7. Enter your Password Reset Question and Answer. This can be used at a later time if you forget your password. 8. Enter your e-mail address. You will receive e-mail notification when new information is available in [address] 9. Click Sign Up. You can now view and download portions of your medical record. 10. Click the Download Summary menu link to download a portable copy of your medical information. Additional Information    If you have questions, please visit the Frequently Asked Questions section of the [email or web address] website at [email or web address] Remember, [email or web address] is NOT to be used for urgent needs.  For medical emergencies, dial 911. DISCHARGE SUMMARY from Nurse    The following personal items are in your possession at time of discharge:    Dental Appliances: None  Visual Aid: Glasses, Sent home     Home Medications: None  Jewelry: None  Clothing: Shirt, Pants, Undergarments, Footwear, With patient  Other Valuables: Cell Phone, At bedside, [address], Sent home (purse picked up by pts dgt)             PATIENT INSTRUCTIONS:    After general anesthesia or intravenous sedation, for 24 hours or while taking prescription Narcotics:  · Limit your activities  · Do not drive and operate hazardous machinery  · Do not make important personal or business decisions  · Do  not drink alcoholic beverages  · If you have not urinated within 8 hours after discharge, please contact your surgeon on call. Report the following to your surgeon:  · Excessive pain, swelling, redness or odor of or around the surgical area  · Temperature over 100.5  · Nausea and vomiting lasting longer than 4 hours or if unable to take medications  · Any signs of decreased circulation or nerve impairment to extremity: change in color, persistent  numbness, tingling, coldness or increase pain  · Any questions        What to do at Home:  Recommended activity: Activity as tolerated,     If you experience any of the following symptoms numbness, vision changes, slurred speach, please follow up with Primary MD.      *  Please give a list of your current medications to your Primary Care Provider. *  Please update this list whenever your medications are discontinued, doses are      changed, or new medications (including over-the-counter products) are added. *  Please carry medication information at all times in case of emergency situations.           These are general instructions for a healthy lifestyle:    No smoking/ No tobacco products/ Avoid exposure to second hand smoke    Surgeon General's Warning:  Quitting smoking now greatly reduces serious risk to your health. Obesity, smoking, and sedentary lifestyle greatly increases your risk for illness    A healthy diet, regular physical exercise & weight monitoring are important for maintaining a healthy lifestyle    You may be retaining fluid if you have a history of heart failure or if you experience any of the following symptoms:  Weight gain of 3 pounds or more overnight or 5 pounds in a week, increased swelling in our hands or feet or shortness of breath while lying flat in bed. Please call your doctor as soon as you notice any of these symptoms; do not wait until your next office visit. Recognize signs and symptoms of STROKE:    F-face looks uneven    A-arms unable to move or move unevenly    S-speech slurred or non-existent    T-time-call 911 as soon as signs and symptoms begin-DO NOT go       Back to bed or wait to see if you get better-TIME IS BRAIN. Warning Signs of HEART ATTACK     Call 911 if you have these symptoms:   Chest discomfort. Most heart attacks involve discomfort in the center of the chest that lasts more than a few minutes, or that goes away and comes back. It can feel like uncomfortable pressure, squeezing, fullness, or pain.  Discomfort in other areas of the upper body. Symptoms can include pain or discomfort in one or both arms, the back, neck, jaw, or stomach.  Shortness of breath with or without chest discomfort.  Other signs may include breaking out in a cold sweat, nausea, or lightheadedness. Don't wait more than five minutes to call 911 - MINUTES MATTER! Fast action can save your life. Calling 911 is almost always the fastest way to get lifesaving treatment. Emergency Medical Services staff can begin treatment when they arrive -- up to an hour sooner than if someone gets to the hospital by car. The discharge information has been reviewed with the patient. The patient verbalized understanding.     Discharge medications reviewed with the patient and appropriate educational materials and side effects teaching were provided.

## 2017-10-17 NOTE — ROUTINE PROCESS
TRANSFER - IN REPORT:    Verbal report received from Lanie Jean RN(name) on [employer]  being received from [facility] ED(unit) for routine progression of care      Report consisted of patients Situation, Background, Assessment and   Recommendations(SBAR). Information from the following report(s) SBAR, Kardex and MAR was reviewed with the receiving nurse. Opportunity for questions and clarification was provided. Assessment completed upon patients arrival to unit and care assumed.

## 2017-10-17 NOTE — PROGRESS NOTES
Phone: [phone]     Hematology / Oncology Progress Note  [facility]      Patient: Divine Starr   MRN: [id number]         CSN: [id number]    YOB: 1955      Admit Date: 10/16/2017    Assessment:     Principal Problem:    TIA (transient ischemic attack) (10/16/2017)    Active Problems:    Benign hypertensive heart disease without heart failure (9/22/2014)      Overview: exertional chest tightness foe a few weeks      progressively worse associated with sob      Obesity (9/22/2014)      Overview: exertional chest tightness foe a few weeks      progressively worse associated with sob      Malignant neoplasm of female breast ([facility] [address] (4/20/2016)      Overview: starting chemo      baseline ef normal      Diabetes ([facility] [address] ()      Hyperlipidemia (7/21/2017)      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  Reviewed MRI brain results, c/w cranio spinal meningitis. Abnormal LFTs , progressively worse concerning for worsening known liver mets. Discussed option of  CSF cytology, craniospinal XRT/ intra thecal chemo biw. Therapy has limited efficacy with such complication. Prognosis poor, life expectancy few weeks. Pt and daughter would like to consider home hospice rather than aggressive intervention. Pt would like to hold off additional imaging. Low dose steroids with H2 blockers  Advance directives in place. D/c planning. total time spent 50min.     Additional notes in [facility]   Office [phone]      Subjective:     Noted with slurred speech and rt facial droop while in office, balance issues for few days  This am speech more clear    Objective:     Visit Vitals    BP 91/56 (BP 1 Location: Left arm, BP Patient Position: At rest)    Pulse 85    Temp 97.9 °F (36.6 °C)    Resp 16    Ht 5' 3\" (1.6 m)  Wt 113.5 kg (250 lb 4.8 oz)    LMP 1993    SpO2 96%    Breastfeeding No    BMI 44.34 kg/m2             Temp (24hrs), Av °F (36.7 °C), Min:97.6 °F (36.4 °C), Max:98.8 °F (37.1 °C)      No intake or output data in the 24 hours ending 10/17/17 0834  Review of Systems:   Constitutional: negative for fevers, chills, sweats and fatigue  Eyes: negative for visual disturbance, redness and icterus  Ears, Nose, Mouth, Throat, and Face: negative for tinnitus, epistaxis, sore mouth and hoarseness  Respiratory: negative for cough, sputum, hemoptysis, pleurisy/chest pain or wheezing  Cardiovascular: negative for chest pain, chest pressure/discomfort, palpitations, irregular heart beats, syncope, paroxysmal nocturnal dyspnea  Gastrointestinal: negative for reflux symptoms, nausea, vomiting, change in bowel habits, melena, diarrhea, constipation and abdominal pain, positive for poor appetite and wtloss  Genitourinary:negative for dysuria, nocturia, urinary incontinence, hesitancy and hematuria  Integument: negative for rash, skin lesion(s) and pruritus  Hematologic/Lymphatic: negative for easy bruising, bleeding and lymphadenopathy  Musculoskeletal:negative for myalgias, arthralgias and bone pain  Neurological: negative for dizziness, seizures, paresthesia and positive for headache, speech problems, balance issues and rt facial weakness    Physical Exam:  Constitutional: Alert, oriented, not in distress  Eyes: PERRLA, anicteric, no redness, watering from eyes  Ears, nose, mouth, throat, and face: no palpable Lymph nodes, no mucositis, no thrush. Respiratory: symmetrical expansion, no rales, no rhonchi, no wheezing. Cardiovascular: S1S2, no pathologic murmur, no rub. Gastrointestinal: soft, benign, non tender, no HSM, normal bowel sounds, no mass. Integument: no rash, no petechiae, no ecchymosis. Musculoskeletal: no edema, no cyanosis, no clubbing.   Neurological:facial droop and speech better this am, no focal weakness  Labs:  Recent Results (from the past 24 hour(s))   EKG, 12 LEAD, INITIAL    Collection Time: 10/16/17 12:01 PM   Result Value Ref Range    Ventricular Rate 78 BPM    Atrial Rate 78 BPM    P-R Interval 126 ms    QRS Duration 108 ms    Q-T Interval 408 ms    QTC Calculation (Bezet) 465 ms    Calculated P Axis 34 degrees    Calculated R Axis -45 degrees    Calculated T Axis 33 degrees    Diagnosis       Normal sinus rhythm  Left axis deviation  Minimal voltage criteria for LVH, may be normal variant  Poor R Wave Progression  Cannot rule out Anterior infarct , age undetermined  Abnormal ECG  When compared with ECG of 19-JUL-2017 14:25,  No significant change was found    Confirmed by Sue Mcclellan MD, ----- ([id number]) on 10/16/2017 2:31:34 PM     GLUCOSE, POC    Collection Time: 10/16/17 12:08 PM   Result Value Ref Range    Glucose (POC) 99 70 - 110 mg/dL   CBC WITH AUTOMATED DIFF    Collection Time: 10/16/17 12:11 PM   Result Value Ref Range    WBC 4.7 4.6 - 13.2 K/uL    RBC 4.82 4.20 - 5.30 M/uL    HGB 13.8 12.0 - 16.0 g/dL    HCT 42.5 35.0 - 45.0 %    MCV 88.2 74.0 - 97.0 FL    MCH 28.6 24.0 - 34.0 PG    MCHC 32.5 31.0 - 37.0 g/dL    RDW 27.0 (H) 11.6 - 14.5 %    PLATELET 193 (L) 160 - 420 K/uL    MPV 10.7 9.2 - 11.8 FL    NEUTROPHILS 86 (H) 40 - 73 %    LYMPHOCYTES 5 (L) 21 - 52 %    MONOCYTES 9 3 - 10 %    EOSINOPHILS 0 0 - 5 %    BASOPHILS 0 0 - 2 %    ABS. NEUTROPHILS 4.1 1.8 - 8.0 K/UL    ABS. LYMPHOCYTES 0.2 (L) 0.9 - 3.6 K/UL    ABS. MONOCYTES 0.4 0.05 - 1.2 K/UL    ABS. EOSINOPHILS 0.0 0.0 - 0.4 K/UL    ABS.  BASOPHILS 0.0 0.0 - 0.06 K/UL    DF AUTOMATED      RBC COMMENTS ANISOCYTOSIS  2+        RBC COMMENTS TARGET CELLS  1+       METABOLIC PANEL, BASIC    Collection Time: 10/16/17 12:11 PM   Result Value Ref Range    Sodium 139 136 - 145 mmol/L    Potassium 4.2 3.5 - 5.5 mmol/L    Chloride 101 100 - 108 mmol/L    CO2 29 21 - 32 mmol/L    Anion gap 9 3.0 - 18 mmol/L    Glucose 109 (H) 74 - 99 mg/dL BUN 15 7.0 - 18 MG/DL    Creatinine 0.78 0.6 - 1.3 MG/DL    BUN/Creatinine ratio 19 12 - 20      GFR est AA >60 >60 ml/min/1.73m2    GFR est non-AA >60 >60 ml/min/1.73m2    Calcium 7.8 (L) 8.5 - 10.1 MG/DL   PROTHROMBIN TIME + INR    Collection Time: 10/16/17 12:11 PM   Result Value Ref Range    Prothrombin time 17.2 (H) 11.5 - 15.2 sec    INR 1.5 (H) 0.8 - 1.2     URIC ACID    Collection Time: 10/16/17 12:11 PM   Result Value Ref Range    Uric acid 7.2 2.6 - 7.2 MG/DL   POC LACTIC ACID    Collection Time: 10/16/17  2:31 PM   Result Value Ref Range    Lactic Acid (POC) 1.8 0.4 - 2.0 mmol/L   GLUCOSE, POC    Collection Time: 10/16/17  9:24 PM   Result Value Ref Range    Glucose (POC) 122 (H) 70 - 937 mg/dL   METABOLIC PANEL, COMPREHENSIVE    Collection Time: 10/17/17  5:40 AM   Result Value Ref Range    Sodium 137 136 - 145 mmol/L    Potassium 4.5 3.5 - 5.5 mmol/L    Chloride 105 100 - 108 mmol/L    CO2 26 21 - 32 mmol/L    Anion gap 6 3.0 - 18 mmol/L    Glucose 93 74 - 99 mg/dL    BUN 11 7.0 - 18 MG/DL    Creatinine 0.62 0.6 - 1.3 MG/DL    BUN/Creatinine ratio 18 12 - 20      GFR est AA >60 >60 ml/min/1.73m2    GFR est non-AA >60 >60 ml/min/1.73m2    Calcium 7.2 (L) 8.5 - 10.1 MG/DL    Bilirubin, total 3.4 (H) 0.2 - 1.0 MG/DL    ALT (SGPT) 115 (H) 13 - 56 U/L    AST (SGOT) 167 (H) 15 - 37 U/L    Alk.  phosphatase 435 (H) 45 - 117 U/L    Protein, total 5.4 (L) 6.4 - 8.2 g/dL    Albumin 1.9 (L) 3.4 - 5.0 g/dL    Globulin 3.5 2.0 - 4.0 g/dL    A-G Ratio 0.5 (L) 0.8 - 1.7     CBC W/O DIFF    Collection Time: 10/17/17  5:40 AM   Result Value Ref Range    WBC 3.8 (L) 4.6 - 13.2 K/uL    RBC 4.13 (L) 4.20 - 5.30 M/uL    HGB 12.3 12.0 - 16.0 g/dL    HCT 36.7 35.0 - 45.0 %    MCV 88.9 74.0 - 97.0 FL    MCH 29.8 24.0 - 34.0 PG    MCHC 33.5 31.0 - 37.0 g/dL    RDW 25.7 (H) 11.6 - 14.5 %    PLATELET 86 (L) 786 - 420 K/uL   GLUCOSE, POC    Collection Time: 10/17/17  7:06 AM   Result Value Ref Range    Glucose (POC) 78 70 - 110 mg/dL

## 2017-10-17 NOTE — PROGRESS NOTES
[facility] Hospitalist Group  Progress Note    Patient: Kia Eddy Age: 59 y.o. : 1955 MR#: [id number] SSN: [id number]  Date: 10/17/2017     Subjective:   Pt states she had a mild HA today upon awakening but resolved after her breakfast. None currently. She denies any fever, chills, N/D/V/C, vision changes, new speech changes. Spoke with daughter Emir Mukul: [phone], will like no further aggressive intervention wanted. Will like home with home hospice. Patient agreed with daughter. Patient is alert and oriented x4, engaging in conversations. Called hospice, will call dtr if plan to visit today. Dtr will like to be present. Assessment/Plan:   1. Slurred speech likely secondary to Acute transient ischemic attack: subsequent resolution of her symptoms within approximately 5 minutes. Neurology consult appreciated. MRI shows Abnormal leptomeningeal enhancement predominating at the left cerebrum  consistent with carcinomatous meningitis. LP/agressive interventations not to be done per patient and daughter. Pt is alert and oriented x4. Will like home with home hospice. Palliative consult and hospice consult. 2. Malignant neoplasm of breast with metastatic disease to the liver, bone of spine, as well as calvarium: Hem/onc consult appreciated - Prognosis poor, life expectancy few weeks. 3. Diabetes mellitus: controlled while inpatient. 4. Hx of Dyslipidemia: lipid panel ordered. 5. Hypertension, benign essential: continue Imdur and metoprolol, spironolactone. Hold SBP<90  6. Obesity: 113.5 kg.    Additional Notes:      Case discussed with:  [x]Patient  [x]Family  []Nursing  []Case Management  DVT Prophylaxis:  []Lovenox  [x]Hep SQ  []SCDs  []Coumadin   []On Heparin gtt    Objective:   VS:   Visit Vitals    /73 (BP 1 Location: Left arm, BP Patient Position: Sitting)    Pulse 94    Temp 97.4 °F (36.3 °C)    Resp 19    Ht 5' 3\" (1.6 m)    Wt 113.5 kg (250 lb 4.8 oz)    LMP 1993    SpO2 97%    Breastfeeding No    BMI 44.34 kg/m2      Tmax/24hrs: Temp (24hrs), Av.8 °F (36.6 °C), Min:97.4 °F (36.3 °C), Max:98.2 °F (36.8 °C)  No intake or output data in the 24 hours ending 10/17/17 1303    General:  Awake, alert, AAO X4, NAD  Cardiovascular:  RRR  Pulmonary: Coarse breath sounds posteriorly - bilat   GI:  Non-TTP, normal bowel sounds  Extremities:  No edema or cyanosis  Additional:      Labs:    Recent Results (from the past 24 hour(s))   POC LACTIC ACID    Collection Time: 10/16/17  2:31 PM   Result Value Ref Range    Lactic Acid (POC) 1.8 0.4 - 2.0 mmol/L   GLUCOSE, POC    Collection Time: 10/16/17  9:24 PM   Result Value Ref Range    Glucose (POC) 122 (H) 70 - 688 mg/dL   METABOLIC PANEL, COMPREHENSIVE    Collection Time: 10/17/17  5:40 AM   Result Value Ref Range    Sodium 137 136 - 145 mmol/L    Potassium 4.5 3.5 - 5.5 mmol/L    Chloride 105 100 - 108 mmol/L    CO2 26 21 - 32 mmol/L    Anion gap 6 3.0 - 18 mmol/L    Glucose 93 74 - 99 mg/dL    BUN 11 7.0 - 18 MG/DL    Creatinine 0.62 0.6 - 1.3 MG/DL    BUN/Creatinine ratio 18 12 - 20      GFR est AA >60 >60 ml/min/1.73m2    GFR est non-AA >60 >60 ml/min/1.73m2    Calcium 7.2 (L) 8.5 - 10.1 MG/DL    Bilirubin, total 3.4 (H) 0.2 - 1.0 MG/DL    ALT (SGPT) 115 (H) 13 - 56 U/L    AST (SGOT) 167 (H) 15 - 37 U/L    Alk.  phosphatase 435 (H) 45 - 117 U/L    Protein, total 5.4 (L) 6.4 - 8.2 g/dL    Albumin 1.9 (L) 3.4 - 5.0 g/dL    Globulin 3.5 2.0 - 4.0 g/dL    A-G Ratio 0.5 (L) 0.8 - 1.7     CBC W/O DIFF    Collection Time: 10/17/17  5:40 AM   Result Value Ref Range    WBC 3.8 (L) 4.6 - 13.2 K/uL    RBC 4.13 (L) 4.20 - 5.30 M/uL    HGB 12.3 12.0 - 16.0 g/dL    HCT 36.7 35.0 - 45.0 %    MCV 88.9 74.0 - 97.0 FL    MCH 29.8 24.0 - 34.0 PG    MCHC 33.5 31.0 - 37.0 g/dL    RDW 25.7 (H) 11.6 - 14.5 %    PLATELET 86 (L) 859 - 420 K/uL   GLUCOSE, POC    Collection Time: 10/17/17  7:06 AM   Result Value Ref Range Glucose (POC) 78 70 - 110 mg/dL   GLUCOSE, POC    Collection Time: 10/17/17 11:38 AM   Result Value Ref Range    Glucose (POC) 89 70 - 110 mg/dL       Signed By: Tomasz Sol PA-C     October 17, 2017 1:03 PM

## 2017-10-17 NOTE — H&P
[address]  ROUTINE H AND PS    Name:  Twila Palomino  MR#:  [id number]  :  1955  Account #:  [de-identified]  Date of Adm:  10/16/2017      HISTORY OF PRESENT ILLNESS: The patient is a pleasant 62-year-  old black female presenting to [facility] Emergency  Department on transfer from [facility] Emergency Department. The patient has a known history of stage IV breast cancer with  metastasis to the liver, spine, and now apparently part of her brain. She went to her oncologist earlier today and while in the oncologist's  office, her physician noted that she developed slurred speech and  some asymmetry of her mouth. These symptoms lasted approximately  4-5 minutes and initially it was felt that she may be having a stroke. She was subsequently sent to [facility] Emergency Department  for further evaluation. CT scan of the head was performed with no  evidence of acute infarct. This was followed by an MRI of the brain with  and without contrast: She was noted to have abnormal leptomeningeal  enhancement predominantly in the left cerebrum consistent with  carcinomatous meningitis. 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 She was subsequently transferred to [facility] for further evaluation of this MRI finding. The patient is seen by Dr. Tong Almeida with Oncology. Dr. Tong Almeida is going to  be at the hospital in the morning along with the patient's daughter to  discuss potential diagnostic studies and treatment options. The  patient's daughter indicates that the patient lives alone and is not able  to get around and function on her own at home. Essentially, she is  unsafe to be home alone as she has high potential for falls. PAST MEDICAL HISTORY  1. Arthritis. 2. Asthma.   3. Hypertension, benign essential.  4. Metastatic breast cancer with metastasis to liver, spine and brain. 5. Congestive heart failure. 6. Depression. 7. Gastroesophageal reflux disease. 8. Atrial fibrillation. 9. Dyslipidemia. SURGICAL HISTORY  1. Right bunionectomy. 2. Placement of chemotherapy port. 3. Prior breast biopsy with subsequent right mastectomy, partial.  4. Cataract removal.  5. Colonoscopy. SOCIAL HISTORY: The patient is a former smoker. She denies current  tobacco use. She denies illicit drug use; however, occasionally does  drink alcohol. FAMILY HISTORY: Father and mother are both . Mother with  a history of osteoarthritis and hypertension. Father with a history of  osteoarthritis, heart disease, hypertension, stroke, and pulmonary  disease. HOME MEDICATIONS  1. Probiotic 2 times daily. 2. Vitamin D3 with 2000 units p.o. daily. 3. Citalopram 20 mg p.o. daily. 4. Lasix 40 mg p.o. 2 times daily. 5. Hydrocodone/acetaminophen 5/325 one to two tabs every 4-6 hours  p.r.n. for pain. 6. Imdur 30 mg p.o. daily. 7. Metoprolol tartrate 50 mg p.o. b.i.d.  8. Multivitamin with minerals 1 tablet daily. 9. Spironolactone 50 mg p.o. b.i.d. REVIEW OF SYSTEMS  GENERAL: Denies generalized fever, chills, malaise, fatigue, or other  constitutional symptoms. PSYCHOLOGIC: Denies depression, anxiety, mood changes. OPHTHALMIC: Denies any eye pain, blurred vision, vision changes or  drainage. ENT: Denies any throat pain, dysphagia, nasal discharge, discharge  from her ears or difficulty hearing. ALLERGY: Denies any rhinitis, sneezing, or other allergic symptoms. HEMATOLOGIC: Denies any abnormal bleeding, bruisability. Denies  history of blood clots. Denies any increased lymph node swelling or  tenderness. ENDOCRINE: Denies any polyuria, polydipsia, polyphagia, or heat or  cold intolerance. RESPIRATORY: Denies shortness of breath or wheezing. CARDIOVASCULAR: Denies chest pain, palpitations, exertional  dyspnea.   GASTROINTESTINAL: Denies abdominal pain, nausea, vomiting,  stool changes, or diarrhea. GENITOURINARY: Denies any hematuria, dysuria, or suprapubic pain. MUSCULOSKELETAL: She does endorse some mild joint pain. She  indicates full range of motion. She denies any joint swelling. NEUROLOGIC: She does endorse some symptoms earlier including  slurred speech and facial asymmetry, however, this is resolved. She  denies any numbness or tingling in the lower extremities. Denies any  generalized weakness. DERMATOLOGIC: Denies any rashes or skin lesions. PHYSICAL EXAMINATION  VITAL SIGNS: Temperature 97.7, pulse 85, respirations 16, blood  pressure 103/68. Pulse oximetry 95% on room air. GENERAL: The patient is a morbidly obese 75-year-old female in no  acute distress. She appears stated age. She has lost a fairly large  portion of her hair. HEAD: Normocephalic, atraumatic. HEENT: Eyes, PERRLA. Ears are clear, without erythema, exudate,  and tympanic membranes are within normal limits. Nose without  rhinorrhea or sinusitis. Throat is clear with no pharyngeal exudate or  erythema. Tongue is within normal limits. No tongue deviation is  appreciated. NECK: Supple, with no masses or tenderness. No thyromegaly. There  is no JVD. Auscultation of the carotids is negative for any bruits. HEART: Regular. No murmurs, rubs or gallops. LUNGS: Clear to auscultation bilaterally with no wheezes, rales or  rhonchi. ABDOMEN: Soft, obese, nontender, bowel sounds are present in all 4  quadrants. No suprapubic or CVA tenderness is present. EXTREMITIES: Warm. Pulses are patent bilaterally. She has no lower  extremity edema. She has full range of motion of all 4 extremities. SKIN: No lesions or rashes. NEUROLOGIC: Cranial nerves 2-12 are grossly intact. Upper and  lower extremity reflexes are 2+, equal and physiologic bilaterally. PSYCHIATRIC: The patient has a normal mood. She shows good  judgment and insight.     LABORATORY DATA: WBC is 4.7, hemoglobin 13.8, hematocrit 42.5,  platelets 124. INR is 1.5, PT 17.2. Sodium 139, potassium 4.2, chloride  101, bicarbonate 29, BUN 15, creatinine 0.78. Uric acid is 7.2. 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 ASSESSMENT  1. Acute transient ischemic attack with subsequent resolution of her  symptoms within approximately 5 minutes. 2. Malignant neoplasm of breast with metastatic disease to the liver,  bone of spine, as well as calvarium. 3. Diabetes mellitus. 4. Dyslipidemia. 5. Hypertension, benign essential.  6. Obesity. PLAN: The patient is admitted to the general medical floor as inpatient  for further evaluation of her transient ischemic attack, as well as  evaluation of these new meningeal findings and possible lumbar  puncture at the recommendation of Neurology. At this point, the  patient's daughter has really indicated that she does not wish her mom  to have this LP, and the patient herself essentially is deferring to her  daughter with regard to ongoing treatment and diagnostic studies. I  think at this point, the patient is a good candidate for palliative/hospice  management. It does not seem that they wish to do anything  aggressive. At this point, I have not ordered a neurology evaluation or  lumbar puncture until we are able to decide whether or not that is the  direction that patient wants to go. For now, I have continued her home  medications.  I will continue to follow the patient's neurological  examination overnight. She will need placement because she is no  longer safe to be at home alone and is at high risk for falls. I suspect  that initially she would be a good candidate for rehab; however, we will  have PT see the patient and see how stable she is. The patient is a  FULL CODE. For DVT prophylaxis, I have ordered bilateral SCDs. I  anticipate discharge in the next 3-4 days.         MD Ridge Putnam MP  D:  10/16/2017   22:48  T:  10/16/2017   23:39  Job #:  [id number]

## 2018-01-22 ENCOUNTER — HOME CARE VISIT (OUTPATIENT)
Dept: HOSPICE | Facility: HOSPICE | Age: 63
End: 2018-01-22
Payer: COMMERCIAL

## (undated) DEVICE — FLEX ADVANTAGE 3000CC: Brand: FLEX ADVANTAGE

## (undated) DEVICE — 3M™ BAIR PAWS FLEX™ WARMING GOWN, STANDARD, 20 PER CASE 81003: Brand: BAIR PAWS™

## (undated) DEVICE — SOLUTION IV 1000ML 0.9% SOD CHL

## (undated) DEVICE — STERILE LATEX POWDER-FREE SURGICAL GLOVESWITH NITRILE COATING: Brand: PROTEXIS

## (undated) DEVICE — (D)PACK ICE DISP -- DISC BY MFR

## (undated) DEVICE — THREE-QUARTER SHEET: Brand: CONVERTORS

## (undated) DEVICE — REM POLYHESIVE ADULT PATIENT RETURN ELECTRODE: Brand: VALLEYLAB

## (undated) DEVICE — PACK PROCEDURE SURG MAJ W/ BASIN LF

## (undated) DEVICE — SHEET, DRAPE, SPLIT, STERILE: Brand: MEDLINE

## (undated) DEVICE — SUT SLK 3-0 30IN SH BLK --

## (undated) DEVICE — INTENDED FOR TISSUE SEPARATION, AND OTHER PROCEDURES THAT REQUIRE A SHARP SURGICAL BLADE TO PUNCTURE OR CUT.: Brand: BARD-PARKER ® CARBON RIB-BACK BLADES

## (undated) DEVICE — SUTURE VCRL SZ 3-0 L18IN ABSRB UD POLYGLACTIN 910 BRAID TIE J910T

## (undated) DEVICE — KENDALL SCD EXPRESS SLEEVES, KNEE LENGTH, MEDIUM: Brand: KENDALL SCD

## (undated) DEVICE — SUT PROL 2-0 30IN CT1 BLU --

## (undated) DEVICE — TRAY PREP DRY W/ PREM GLV 2 APPL 6 SPNG 2 UNDPD 1 OVERWRAP

## (undated) DEVICE — KIT CLN UP BON SECOURS MARYV

## (undated) DEVICE — SUTURE MCRYL SZ 4-0 L27IN ABSRB UD L24MM PS-1 3/8 CIR PRIM Y935H

## (undated) DEVICE — DERMABOND SKIN ADH 0.7ML -- DERMABOND ADVANCED 12/BX

## (undated) DEVICE — GAUZE SPONGES,8 PLY: Brand: CURITY

## (undated) DEVICE — (D)PREP SKN CHLRAPRP APPL 26ML -- CONVERT TO ITEM 371833

## (undated) DEVICE — SHEET, T, LAPAROTOMY, STERILE: Brand: MEDLINE

## (undated) DEVICE — INTENDED FOR TISSUE SEPARATION, AND OTHER PROCEDURES THAT REQUIRE A SHARP SURGICAL BLADE TO PUNCTURE OR CUT.: Brand: BARD-PARKER SAFETY BLADES SIZE 10, STERILE

## (undated) DEVICE — SUTURE MCRYL SZ 4-0 L18IN ABSRB UD L19MM PS-2 3/8 CIR PRIM Y496G

## (undated) DEVICE — GLOVE SURG BIOGEL 8.0 STRL -- SKINSENSE

## (undated) DEVICE — SUTURE VCRL SZ 3-0 L27IN ABSRB UD L26MM SH 1/2 CIR J416H

## (undated) DEVICE — DRAPE XR C ARM 41X74IN LF --

## (undated) DEVICE — ADHESIVE TISS DERMA FLEX 0.7ML -- HIGH VISCOSITY

## (undated) DEVICE — RADIFOCUS GLIDEWIRE: Brand: GLIDEWIRE

## (undated) DEVICE — GLOVE SURG SZ 7.5 L11.73IN FNGR THK9.8MIL STRW LTX POLYMER

## (undated) DEVICE — Device